# Patient Record
Sex: FEMALE | Race: WHITE | Employment: FULL TIME | URBAN - METROPOLITAN AREA
[De-identification: names, ages, dates, MRNs, and addresses within clinical notes are randomized per-mention and may not be internally consistent; named-entity substitution may affect disease eponyms.]

---

## 2019-11-05 ENCOUNTER — HOSPITAL ENCOUNTER (EMERGENCY)
Age: 59
Discharge: HOME | End: 2019-11-05
Attending: EMERGENCY MEDICINE
Payer: SELF-PAY

## 2019-11-05 VITALS
HEIGHT: 63 IN | RESPIRATION RATE: 16 BRPM | DIASTOLIC BLOOD PRESSURE: 60 MMHG | WEIGHT: 165 LBS | OXYGEN SATURATION: 94 % | BODY MASS INDEX: 29.23 KG/M2 | SYSTOLIC BLOOD PRESSURE: 146 MMHG | HEART RATE: 83 BPM | TEMPERATURE: 98 F

## 2019-11-05 DIAGNOSIS — H35.62 RETINAL HEMORRHAGE, LEFT: Primary | ICD-10-CM

## 2019-11-05 PROCEDURE — 99283 EMERGENCY DEPT VISIT LOW MDM: CPT | Performed by: EMERGENCY MEDICINE

## 2019-11-05 NOTE — ED PROVIDER NOTES
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 She is not a diabetic. Denies any trauma. Denies any pain in her head or eyes. No ear discomfort. No recent travel. She was able to get into see her optometrist who did a corneal exam and found the periphery with signs of retinal hemorrhage which is suspicious for retinal detachment. She did call me to leave a report about the patient but is concerned that patient may have retinal detachment. ROS  Constitutional: No fever, no chills  Skin: no rash  Eye: No vision changes  ENMT: No sore throat  Respiratory: No shortness of breath, no cough  Cardiovascular: No chest pain, no palpitations  Gastrointestinal: No vomiting, no nausea, no diarrhea, no abdominal pain  : No dysuria  MSK: No back pain, no muscle pain, no joint pain  Neuro: No headache, no change in mental status, no numbness, no tingling, no weakness  Psych:   Endocrine:   All other review of systems positive per history of present illness and the above otherwise negative or noncontributory. Visit Vitals  /60   Pulse 83   Temp 98 °F (36.7 °C)   Resp 16   Ht 5' 3\" (1.6 m)   Wt 74.8 kg (165 lb)   SpO2 94%   BMI 29.23 kg/m²     History reviewed. No pertinent past medical history. History reviewed. No pertinent surgical history. None         Adult Exam   General: alert, no acute distress  Head: normocephalic, atraumatic  ENT: moist mucous membranes  Pupils are dilated bilaterally. Extraocular movement intact.   Neck: supple, non-tender; full range of motion  Cardiovascular:  normal peripheral perfusion, no edema  Respiratory:  normal respirations;  Gastrointestinal: soft, non-tender  Back: non-tender, full range of motion  Musculoskeletal: normal range of motion, normal strength, no gross deformities  Neurological: alert and oriented x 4, no gross focal deficits; normal speech  Psychiatric: cooperative; appropriate mood and affect    MDM: A quick bedside ultrasound showing there is haziness within the intraocular with a thin film floating on the periphery. Based off of exam and bedside ultrasound concern for retinal detachment. I spoke with the on-call ophthalmologist Dr. Laura Briceño who will come to assess the patient. 6:47 PM  Dr. Laura Briceño at bedside to assess the patient. He sees retinal hemorrhages. Would like patient to see him in the office tonight for further intervention. He has 2 cases that are pending in the operating room tonight. He will call her to go to the office once he is done with his surgical cases. Patient is aware. Dr. Laura Briceño has the patient's cell phone number. She is stable for discharge at this time. I recommend that she remain n.p.o. and goes to the office tonight once he call. Dragon voice recognition software was used to create this note. Although the note has been reviewed and corrected where necessary, additional errors may have been overlooked and remain in the text.

## 2019-11-05 NOTE — ED TRIAGE NOTES
Pt was sent by her eye doctor for possible detached retina in left eye. Pt states she was walking and started to see floaters and that eye doctor saw possible retina detachment. Pt denies.  Cannot normally see out of left eye

## 2019-11-06 NOTE — ED NOTES

## 2020-04-11 ENCOUNTER — PATIENT OUTREACH (OUTPATIENT)
Dept: CASE MANAGEMENT | Age: 60
End: 2020-04-11

## 2020-04-11 NOTE — PROGRESS NOTES
COVID-19 Screening Follow-up Note    Patient contacted regarding COVID-19 risk and screening. Care Transition Nurse/ Ambulatory Care Manager contacted the patient by telephone to perform follow-up assessment. Verified name and  with patient as identifiers. Patient has following risk factors of: patient recently had viral symptoms and was seen at [facility] urgent care. She received email to sign up for remote symptom monitoring program after this appointment. This morning when she first woke she was feeling a bit lethargic and logged these symptoms in the symptome monitoring program which gave a yellow symptom alert for this outreach to the patient. Since that time she has come around and feels better now. Symptoms reviewed with patient who verbalized the following symptoms: lethargy/fatigue. Patient mentioned that she does not have a thermometer - provided her with the phone number to three local pharmacies in her area that provide delivery service to see if they can deliver one for her. Also recommended other delivery services that she may be able to order a thermometer from such as [address] [place] [address] [address] or [address] [facility]. Due to no new or worsening symptoms encounter was not routed to provider for escalation. Education provided regarding infection prevention, and signs and symptoms of COVID-19 and when to seek medical attention with patient who verbalized understanding. Discussed exposure protocols and quarantine from [address] you at higher risk for severe illness  and given an opportunity for questions and concerns. The patient agrees to contact the COVID-19 hotline [phone] or PCP office for questions related to their healthcare. CTN/ACM provided contact information for future reference. From CDC: Are you at higher risk for severe illness?  Wash your hands often.  Avoid close contact (6 feet, which is about two arm lengths) with people who are sick.    Put distance between yourself and other people if COVID-19 is spreading in your community.  Clean and disinfect frequently touched surfaces.  Avoid all cruise travel and non-essential air travel.  Call your healthcare professional if you have concerns about COVID-19 and your underlying condition or if you are sick.     For more information on steps you can take to protect yourself, see CDC's How to Adriana for follow-up call based on remote symptom monitoring alerts which are based on severity of symptoms and risk factors

## 2020-04-20 ENCOUNTER — PATIENT OUTREACH (OUTPATIENT)
Dept: CASE MANAGEMENT | Age: 60
End: 2020-04-20

## 2020-04-20 NOTE — PROGRESS NOTES
CNT notified of symptom trigger in [facility] Loop system and reached out to follow up from alert on 4/19. CTN sent message in Loop system to follow up and will chart response accordingly.

## 2022-06-03 ENCOUNTER — OFFICE VISIT (OUTPATIENT)
Dept: URGENT CARE | Facility: CLINIC | Age: 62
End: 2022-06-03

## 2022-06-03 VITALS
OXYGEN SATURATION: 97 % | WEIGHT: 139.8 LBS | HEIGHT: 66 IN | SYSTOLIC BLOOD PRESSURE: 94 MMHG | BODY MASS INDEX: 22.47 KG/M2 | TEMPERATURE: 99.1 F | DIASTOLIC BLOOD PRESSURE: 62 MMHG | HEART RATE: 76 BPM

## 2022-06-03 DIAGNOSIS — L03.011 PARONYCHIA OF RIGHT RING FINGER: Primary | ICD-10-CM

## 2022-06-03 PROCEDURE — 99203 OFFICE O/P NEW LOW 30 MIN: CPT | Performed by: NURSE PRACTITIONER

## 2022-06-03 RX ORDER — CEPHALEXIN 500 MG/1
500 CAPSULE ORAL 2 TIMES DAILY
Qty: 14 CAPSULE | Refills: 0 | Status: SHIPPED | OUTPATIENT
Start: 2022-06-03 | End: 2022-06-10

## 2022-06-03 ASSESSMENT — ENCOUNTER SYMPTOMS
SHORTNESS OF BREATH: 0
COLOR CHANGE: 1

## 2022-06-03 NOTE — PATIENT INSTRUCTIONS
Patient Education        Paronychia: Care Instructions  Overview  Paronychia (say \"qfdj-[id number]-SG-blair-uh\") is an inflammation of the skin around a fingernail or toenail. It happens when germs enter through a break in the skin. If you had an abscess, your doctor may have made a small cut in the infectedarea to drain the pus. Most cases of paronychia improve in a few days. But watch your symptoms and follow your doctor's advice. Though rare, a mild case can turn into something more serious and infect your entire finger or toe. Also, it is possible for aninfection to return. Follow-up care is a key part of your treatment and safety. Be sure to make and go to all appointments, and call your doctor if you are having problems. It's also a good idea to know your test results and keep alist of the medicines you take. How can you care for yourself at home?  If your doctor told you how to care for your infected nail, follow the doctor's instructions. If you did not get instructions, follow this general advice:  ? Wash the area with clean water 2 times a day. Don't use hydrogen peroxide or alcohol, which can slow healing. ? You may cover the area with a thin layer of petroleum jelly, such as Vaseline, and a nonstick bandage. ? Apply more petroleum jelly and replace the bandage as needed.  If your doctor prescribed antibiotics, take them as directed. Do not stop taking them just because you feel better. You need to take the full course of antibiotics.  Take an over-the-counter pain medicine, such as acetaminophen (Tylenol), ibuprofen (Advil, Motrin), or naproxen (Aleve). Read and follow all instructions on the label.  Do not take two or more pain medicines at the same time unless the doctor told you to. Many pain medicines have acetaminophen, which is Tylenol. Too much acetaminophen (Tylenol) can be harmful.  Prop up the toe or finger so that it is higher than the level of your heart.  This will help with pain and swelling.  Apply heat. Put a warm water bottle, heating pad set on low, or warm cloth on your finger or toe. Do not go to sleep with a heating pad on your skin.  Soak the area in warm water twice a day for 15 minutes each time. After soaking, dry the area well and apply a thin layer of petroleum jelly, such as Vaseline. Put on a new bandage. When should you call for help? Call your doctor now or seek immediate medical care if:     You have signs of new or worsening infection, such as:  ? Increased pain, swelling, warmth, or redness. ? Red streaks leading from the infected skin. ? Pus draining from the area. ? A fever. Watch closely for changes in your health, and be sure to contact your doctor if:     You do not get better as expected. Where can you learn more? Go to [email or web address]://[email or web address] org and sign in to your [employer] account. Enter [phone] in the [employer] box to learn more about \"Paronychia: Care Instructions. \"     If you do not have an account, please click on the \"Sign Up Now\" link. Current as of: November 15, 2021               Content Version: 13.2  © 7568-6606 [employer]wise, Incorporated. Care instructions adapted under license by [facility] ([facility]). If you have questions about a medical condition or this instruction, always ask your healthcare professional. [place] [address] any warranty or liability for your use of this information.

## 2022-06-29 ENCOUNTER — OFFICE VISIT (OUTPATIENT)
Dept: URGENT CARE | Facility: CLINIC | Age: 62
End: 2022-06-29

## 2022-06-29 VITALS
SYSTOLIC BLOOD PRESSURE: 98 MMHG | HEART RATE: 96 BPM | OXYGEN SATURATION: 97 % | DIASTOLIC BLOOD PRESSURE: 70 MMHG | WEIGHT: 125 LBS | BODY MASS INDEX: 20.09 KG/M2 | HEIGHT: 66 IN | TEMPERATURE: 98.6 F

## 2022-06-29 DIAGNOSIS — L02.412 ABSCESSES OF BOTH AXILLAE: Primary | ICD-10-CM

## 2022-06-29 DIAGNOSIS — L02.411 ABSCESSES OF BOTH AXILLAE: Primary | ICD-10-CM

## 2022-06-29 DIAGNOSIS — N76.4 ABSCESS OF GENITAL LABIA: ICD-10-CM

## 2022-06-29 PROCEDURE — 99213 OFFICE O/P EST LOW 20 MIN: CPT | Performed by: PHYSICIAN ASSISTANT

## 2022-06-29 RX ORDER — DOXYCYCLINE HYCLATE 100 MG
100 TABLET ORAL 2 TIMES DAILY
Qty: 14 TABLET | Refills: 0 | Status: ON HOLD | OUTPATIENT
Start: 2022-06-29 | End: 2022-07-03 | Stop reason: HOSPADM

## 2022-06-29 ASSESSMENT — ENCOUNTER SYMPTOMS
COUGH: 0
SORE THROAT: 0
DIARRHEA: 0
SHORTNESS OF BREATH: 0
ABDOMINAL PAIN: 0
VOMITING: 0
NAUSEA: 0

## 2022-06-29 NOTE — PROGRESS NOTES
Mindi Li (: 1960) is a 62 y.o. female is here for evaluation of the following chief complaint(s):  No chief complaint on file. ASSESSMENT/PLAN:  Below is the assessment and plan developed based on review of pertinent history, physical exam, labs, studies, and medications. Diagnoses and all orders for this visit:    Abscesses of both axillae  -     doxycycline hyclate (VIBRA-TABS) 100 MG tablet; Take 1 tablet by mouth 2 times daily for 7 days  -     INCISION AND DRAINAGE    Abscess of genital labia  -     Berlin Mayes MD, Gynecology, [place]    Labial abscess: stat referral to OBGYN placed. Patient to apply warm compresses as discussed. Strict ER precautions given. Bilateral axilla abscess: Risks and benefits of I&D dicussed with patient. Patient gives verbal consent. The areas were prepared and draped in the usual sterile manner. Sites were anesthetized with 1% lidocaine without epinephrine. Linear incisions were made to each axilla and purulent material was expressed. The abscesses were explored thoroughly and sequestered pockets were opened. Bleeding was minimal. Wounds were packed and covered with a sterile dressing. Patient instructed to return to clinic to have packing removed in 48 hours. Patient tolerated the procedure well without complications. Standard post procedure care is explained and return precautions are given. Patient to start doxycycline as prescribed. We discussed potential side effects and appropriate precautions given. Patient to apply warm compresses as discussed. Strict ER precautions given. Patient will RTC on Friday for follow up/packing change. Patient to follow up with PCP as discussed. Patient to return to clinic if symptoms persist or worsen.  We discussed reasons to present to the ER or call 911, including but not limited to headache, blurred vision, speech disturbance, difficulty with ambulation/gait, numbness, tingling, weakness, chest pain, shortness of breath, syncope. Patient verbalizes understanding and agreement. SUBJECTIVE/OBJECTIVE:  Patient is a 61 y/o female who presents today endorsing multiple abscesses. She has one in each armpit and one on her left labia. They are very tender. She has been applying warm compresses. She says she has never had this issue before. Patient denies fever, chills, chest pain, shortness of breath, nausea, vomiting, diarrhea, abdominal or back pain, lightheadedness, dizziness, weakness. She denies known drug allergies. No Known Allergies  No past medical history on file. Past Surgical History:   Procedure Laterality Date    VARICOSE VEIN SURGERY Left 2009     No family history on file. Social Connections:     Frequency of Communication with Friends and Family: Not on file    Frequency of Social Gatherings with Friends and Family: Not on file    Attends Anabaptist Services: Not on file    Active Member of Clubs or Organizations: Not on file    Attends Club or Organization Meetings: Not on file    Marital Status: Not on file          Review of Systems   Constitutional: Negative for chills and fever. HENT: Negative for sore throat. Respiratory: Negative for cough and shortness of breath. Cardiovascular: Negative for chest pain, palpitations and leg swelling. Gastrointestinal: Negative for abdominal pain, diarrhea, nausea and vomiting. Skin: Negative for rash. Boil to bilateral axilla, left labia   Neurological: Negative for dizziness, light-headedness and headaches. BP 98/70 (Site: Right Upper Arm, Position: Sitting, Cuff Size: Medium Adult)   Pulse 96   Temp 98.6 °F (37 °C) (Oral)   Ht 5' 6\" (1.676 m)   Wt 125 lb (56.7 kg)   SpO2 97%   BMI 20.18 kg/m²      Physical Exam  Vitals and nursing note reviewed. Exam conducted with a chaperone present. Constitutional:       Appearance: Normal appearance. HENT:      Head: Normocephalic and atraumatic. Eyes:      Conjunctiva/sclera: Conjunctivae normal.   Cardiovascular:      Rate and Rhythm: Normal rate and regular rhythm. Pulses: Normal pulses. Heart sounds: Normal heart sounds. Pulmonary:      Effort: Pulmonary effort is normal.      Breath sounds: Normal breath sounds. No wheezing, rhonchi or rales. Abdominal:      Tenderness: There is no guarding. Musculoskeletal:      Cervical back: Normal range of motion. Skin:     General: Skin is warm and dry. Comments: Left axilla with area of erythema, warmth, tenderness, fluctuance noted. Right axilla with area of erythema, warmth, tenderness, fluctuance noted. Left labia majora with tenderness and area of induration noted. Neurological:      Mental Status: She is alert. Psychiatric:         Mood and Affect: Mood normal.         Behavior: Behavior normal.           An electronic signature was used to authenticate this note.   -- PRAVEEN Van

## 2022-07-01 ENCOUNTER — HOSPITAL ENCOUNTER (INPATIENT)
Age: 62
LOS: 2 days | Discharge: HOME | DRG: 584 | End: 2022-07-03
Attending: EMERGENCY MEDICINE | Admitting: HOSPITALIST

## 2022-07-01 ENCOUNTER — HOSPITAL ENCOUNTER (EMERGENCY)
Dept: GENERAL RADIOLOGY | Age: 62
Discharge: HOME | DRG: 584 | End: 2022-07-04

## 2022-07-01 ENCOUNTER — HOSPITAL ENCOUNTER (EMERGENCY)
Dept: CT IMAGING | Age: 62
Discharge: HOME | DRG: 584 | End: 2022-07-04

## 2022-07-01 ENCOUNTER — OFFICE VISIT (OUTPATIENT)
Dept: URGENT CARE | Facility: CLINIC | Age: 62
End: 2022-07-01

## 2022-07-01 VITALS
TEMPERATURE: 98.7 F | WEIGHT: 125 LBS | OXYGEN SATURATION: 94 % | HEIGHT: 67 IN | DIASTOLIC BLOOD PRESSURE: 65 MMHG | BODY MASS INDEX: 19.62 KG/M2 | SYSTOLIC BLOOD PRESSURE: 103 MMHG | HEART RATE: 86 BPM | RESPIRATION RATE: 20 BRPM

## 2022-07-01 DIAGNOSIS — L02.412 ABSCESSES OF BOTH AXILLAE: Primary | ICD-10-CM

## 2022-07-01 DIAGNOSIS — L02.411 ABSCESS OF AXILLA, RIGHT: ICD-10-CM

## 2022-07-01 DIAGNOSIS — L02.411 ABSCESSES OF BOTH AXILLAE: Primary | ICD-10-CM

## 2022-07-01 DIAGNOSIS — N61.0 CELLULITIS OF LEFT BREAST: Primary | ICD-10-CM

## 2022-07-01 DIAGNOSIS — Z48.00 ENCOUNTER FOR CHANGE OF DRESSING: ICD-10-CM

## 2022-07-01 DIAGNOSIS — L02.412 ABSCESS OF AXILLA, LEFT: ICD-10-CM

## 2022-07-01 LAB
ALBUMIN SERPL-MCNC: 2.7 G/DL (ref 3.2–4.6)
ALBUMIN/GLOB SERPL: 0.6 {RATIO} (ref 1.2–3.5)
ALP SERPL-CCNC: 75 U/L (ref 50–136)
ALT SERPL-CCNC: 33 U/L (ref 12–65)
ANION GAP SERPL CALC-SCNC: 9 MMOL/L (ref 7–16)
AST SERPL-CCNC: 4 U/L (ref 15–37)
BASOPHILS # BLD: 0 K/UL (ref 0–0.2)
BASOPHILS NFR BLD: 0 % (ref 0–2)
BILIRUB SERPL-MCNC: 0.5 MG/DL (ref 0.2–1.1)
BUN SERPL-MCNC: 16 MG/DL (ref 8–23)
CALCIUM SERPL-MCNC: 8.6 MG/DL (ref 8.3–10.4)
CHLORIDE SERPL-SCNC: 104 MMOL/L (ref 98–107)
CO2 SERPL-SCNC: 24 MMOL/L (ref 21–32)
CREAT SERPL-MCNC: 0.5 MG/DL (ref 0.6–1)
DIFFERENTIAL METHOD BLD: ABNORMAL
EKG ATRIAL RATE: 69 BPM
EKG DIAGNOSIS: NORMAL
EKG Q-T INTERVAL: 414 MS
EKG QRS DURATION: 97 MS
EKG QTC CALCULATION (BAZETT): 437 MS
EKG R AXIS: 28 DEGREES
EKG T AXIS: 39 DEGREES
EKG VENTRICULAR RATE: 67 BPM
EOSINOPHIL # BLD: 0 K/UL (ref 0–0.8)
EOSINOPHIL NFR BLD: 1 % (ref 0.5–7.8)
ERYTHROCYTE [DISTWIDTH] IN BLOOD BY AUTOMATED COUNT: 14.6 % (ref 11.9–14.6)
GLOBULIN SER CALC-MCNC: 4.4 G/DL (ref 2.3–3.5)
GLUCOSE SERPL-MCNC: 83 MG/DL (ref 65–100)
HCT VFR BLD AUTO: 32.7 % (ref 35.8–46.3)
HGB BLD-MCNC: 10.8 G/DL (ref 11.7–15.4)
IMM GRANULOCYTES # BLD AUTO: 0 K/UL (ref 0–0.5)
IMM GRANULOCYTES NFR BLD AUTO: 1 % (ref 0–5)
LACTATE SERPL-SCNC: 0.6 MMOL/L (ref 0.4–2)
LYMPHOCYTES # BLD: 0.5 K/UL (ref 0.5–4.6)
LYMPHOCYTES NFR BLD: 15 % (ref 13–44)
MCH RBC QN AUTO: 28.6 PG (ref 26.1–32.9)
MCHC RBC AUTO-ENTMCNC: 33 G/DL (ref 31.4–35)
MCV RBC AUTO: 86.5 FL (ref 79.6–97.8)
MONOCYTES # BLD: 0.4 K/UL (ref 0.1–1.3)
MONOCYTES NFR BLD: 12 % (ref 4–12)
NEUTS SEG # BLD: 2.3 K/UL (ref 1.7–8.2)
NEUTS SEG NFR BLD: 71 % (ref 43–78)
NRBC # BLD: 0 K/UL (ref 0–0.2)
PLATELET # BLD AUTO: 169 K/UL (ref 150–450)
PMV BLD AUTO: 10.7 FL (ref 9.4–12.3)
POTASSIUM SERPL-SCNC: 3.4 MMOL/L (ref 3.5–5.1)
PROCALCITONIN SERPL-MCNC: 0.24 NG/ML (ref 0–0.49)
PROT SERPL-MCNC: 7.1 G/DL (ref 6.3–8.2)
RBC # BLD AUTO: 3.78 M/UL (ref 4.05–5.2)
SODIUM SERPL-SCNC: 137 MMOL/L (ref 136–145)
WBC # BLD AUTO: 3.3 K/UL (ref 4.3–11.1)

## 2022-07-01 PROCEDURE — 6360000002 HC RX W HCPCS: Performed by: HOSPITALIST

## 2022-07-01 PROCEDURE — 1100000000 HC RM PRIVATE

## 2022-07-01 PROCEDURE — 2580000003 HC RX 258: Performed by: HOSPITALIST

## 2022-07-01 PROCEDURE — 71260 CT THORAX DX C+: CPT

## 2022-07-01 PROCEDURE — 2500000003 HC RX 250 WO HCPCS: Performed by: EMERGENCY MEDICINE

## 2022-07-01 PROCEDURE — 71045 X-RAY EXAM CHEST 1 VIEW: CPT

## 2022-07-01 PROCEDURE — 2580000003 HC RX 258: Performed by: EMERGENCY MEDICINE

## 2022-07-01 PROCEDURE — 99285 EMERGENCY DEPT VISIT HI MDM: CPT

## 2022-07-01 PROCEDURE — 84145 PROCALCITONIN (PCT): CPT

## 2022-07-01 PROCEDURE — 94760 N-INVAS EAR/PLS OXIMETRY 1: CPT

## 2022-07-01 PROCEDURE — 96365 THER/PROPH/DIAG IV INF INIT: CPT

## 2022-07-01 PROCEDURE — 80053 COMPREHEN METABOLIC PANEL: CPT

## 2022-07-01 PROCEDURE — 6360000002 HC RX W HCPCS: Performed by: EMERGENCY MEDICINE

## 2022-07-01 PROCEDURE — 83605 ASSAY OF LACTIC ACID: CPT

## 2022-07-01 PROCEDURE — 6360000004 HC RX CONTRAST MEDICATION: Performed by: PHYSICIAN ASSISTANT

## 2022-07-01 PROCEDURE — 85025 COMPLETE CBC W/AUTO DIFF WBC: CPT

## 2022-07-01 PROCEDURE — 87040 BLOOD CULTURE FOR BACTERIA: CPT

## 2022-07-01 PROCEDURE — 99213 OFFICE O/P EST LOW 20 MIN: CPT | Performed by: NURSE PRACTITIONER

## 2022-07-01 RX ORDER — ONDANSETRON 4 MG/1
4 TABLET, ORALLY DISINTEGRATING ORAL EVERY 8 HOURS PRN
Status: DISCONTINUED | OUTPATIENT
Start: 2022-07-01 | End: 2022-07-03 | Stop reason: HOSPADM

## 2022-07-01 RX ORDER — SODIUM CHLORIDE 0.9 % (FLUSH) 0.9 %
5-40 SYRINGE (ML) INJECTION EVERY 12 HOURS SCHEDULED
Status: DISCONTINUED | OUTPATIENT
Start: 2022-07-01 | End: 2022-07-03 | Stop reason: HOSPADM

## 2022-07-01 RX ORDER — ACETAMINOPHEN 650 MG/1
650 SUPPOSITORY RECTAL EVERY 6 HOURS PRN
Status: DISCONTINUED | OUTPATIENT
Start: 2022-07-01 | End: 2022-07-03 | Stop reason: HOSPADM

## 2022-07-01 RX ORDER — MORPHINE SULFATE 4 MG/ML
4 INJECTION INTRAVENOUS EVERY 4 HOURS PRN
Status: DISCONTINUED | OUTPATIENT
Start: 2022-07-01 | End: 2022-07-02

## 2022-07-01 RX ORDER — ACETAMINOPHEN 325 MG/1
650 TABLET ORAL EVERY 6 HOURS PRN
Status: DISCONTINUED | OUTPATIENT
Start: 2022-07-01 | End: 2022-07-03 | Stop reason: HOSPADM

## 2022-07-01 RX ORDER — ONDANSETRON 2 MG/ML
4 INJECTION INTRAMUSCULAR; INTRAVENOUS EVERY 6 HOURS PRN
Status: DISCONTINUED | OUTPATIENT
Start: 2022-07-01 | End: 2022-07-03 | Stop reason: HOSPADM

## 2022-07-01 RX ORDER — ENOXAPARIN SODIUM 100 MG/ML
40 INJECTION SUBCUTANEOUS DAILY
Status: DISCONTINUED | OUTPATIENT
Start: 2022-07-02 | End: 2022-07-03 | Stop reason: HOSPADM

## 2022-07-01 RX ORDER — 0.9 % SODIUM CHLORIDE 0.9 %
100 INTRAVENOUS SOLUTION INTRAVENOUS
Status: DISCONTINUED | OUTPATIENT
Start: 2022-07-01 | End: 2022-07-05 | Stop reason: HOSPADM

## 2022-07-01 RX ORDER — POLYETHYLENE GLYCOL 3350 17 G/17G
17 POWDER, FOR SOLUTION ORAL DAILY PRN
Status: DISCONTINUED | OUTPATIENT
Start: 2022-07-01 | End: 2022-07-03 | Stop reason: HOSPADM

## 2022-07-01 RX ORDER — SODIUM CHLORIDE 9 MG/ML
INJECTION, SOLUTION INTRAVENOUS PRN
Status: DISCONTINUED | OUTPATIENT
Start: 2022-07-01 | End: 2022-07-03 | Stop reason: HOSPADM

## 2022-07-01 RX ORDER — CLINDAMYCIN PHOSPHATE 900 MG/50ML
900 INJECTION INTRAVENOUS
Status: COMPLETED | OUTPATIENT
Start: 2022-07-01 | End: 2022-07-01

## 2022-07-01 RX ORDER — SODIUM CHLORIDE 0.9 % (FLUSH) 0.9 %
5-40 SYRINGE (ML) INJECTION PRN
Status: DISCONTINUED | OUTPATIENT
Start: 2022-07-01 | End: 2022-07-03 | Stop reason: HOSPADM

## 2022-07-01 RX ORDER — SODIUM CHLORIDE 0.9 % (FLUSH) 0.9 %
10 SYRINGE (ML) INJECTION
Status: DISCONTINUED | OUTPATIENT
Start: 2022-07-01 | End: 2022-07-05 | Stop reason: HOSPADM

## 2022-07-01 RX ORDER — 0.9 % SODIUM CHLORIDE 0.9 %
1000 INTRAVENOUS SOLUTION INTRAVENOUS
Status: COMPLETED | OUTPATIENT
Start: 2022-07-01 | End: 2022-07-01

## 2022-07-01 RX ADMIN — SODIUM CHLORIDE, PRESERVATIVE FREE 10 ML: 5 INJECTION INTRAVENOUS at 23:50

## 2022-07-01 RX ADMIN — SODIUM CHLORIDE 1000 ML: 9 INJECTION, SOLUTION INTRAVENOUS at 19:13

## 2022-07-01 RX ADMIN — VANCOMYCIN HYDROCHLORIDE 1000 MG: 1 INJECTION, POWDER, LYOPHILIZED, FOR SOLUTION INTRAVENOUS at 22:05

## 2022-07-01 RX ADMIN — CEFEPIME HYDROCHLORIDE 2000 MG: 2 INJECTION, POWDER, FOR SOLUTION INTRAVENOUS at 23:00

## 2022-07-01 RX ADMIN — CLINDAMYCIN PHOSPHATE 900 MG: 900 INJECTION, SOLUTION INTRAVENOUS at 19:12

## 2022-07-01 RX ADMIN — IOPAMIDOL 80 ML: 755 INJECTION, SOLUTION INTRAVENOUS at 20:34

## 2022-07-01 ASSESSMENT — PAIN - FUNCTIONAL ASSESSMENT: PAIN_FUNCTIONAL_ASSESSMENT: 0-10

## 2022-07-01 ASSESSMENT — ENCOUNTER SYMPTOMS
ABDOMINAL PAIN: 0
EYE REDNESS: 0
CONSTIPATION: 0
DIARRHEA: 0
SHORTNESS OF BREATH: 0
SINUS PAIN: 0
TROUBLE SWALLOWING: 0
EYE ITCHING: 0
VOMITING: 0
COUGH: 0
BACK PAIN: 0
NAUSEA: 1
NAUSEA: 0
WHEEZING: 0
EYE PAIN: 0
COUGH: 0
ABDOMINAL PAIN: 0
SHORTNESS OF BREATH: 0

## 2022-07-01 ASSESSMENT — PAIN SCALES - GENERAL
PAINLEVEL_OUTOF10: 0
PAINLEVEL_OUTOF10: 0
PAINLEVEL_OUTOF10: 6

## 2022-07-01 NOTE — ED PROVIDER NOTES
[facility] Emergency Department Provider Note                   PCP:                None Provider               Age: 62 y.o. Sex: female       ICD-10-CM    1. Cellulitis of left breast  N61.0    2. Abscess of axilla, left  L02.412    3.  Abscess of axilla, right  L02.411        DISPOSITION Decision To Admit 07/01/2022 09:23:48 PM       New Prescriptions    No medications on file       Orders Placed This Encounter   Procedures    Culture, Blood 1    Culture, Blood 1    XR CHEST PORTABLE    CT CHEST W CONTRAST    Lactic Acid    Lactic Acid    CBC with Auto Differential    CMP    Procalcitonin    Straight Cath (Select if patient is unable to provide a sample)    Vital signs    EKG 12 Lead    Saline lock IV        Barbara Handy MD 9:25 PM      MDM  Number of Diagnoses or Management Options  Abscess of axilla, left: established, worsening  Abscess of axilla, right: established, worsening  Cellulitis of left breast: new, needed workup  Diagnosis management comments: 78-year-old female patient presents to the ER with concerns over pain swelling redness in her left breast.  She is status post I&D of bilateral axillary abscesses  She is taken few of any other doxycycline she was prescribed 2 days ago    Patient will need septic work-up and imaging    9:25 PM  Work-up today reveals evidence of cellulitis with no abscess in the breast.  Given the progression despite being on doxycycline, I will asked the hospitalist service to admit  Blood cultures were obtained  No evidence of sepsis  IV clindamycin started here in ER       Amount and/or Complexity of Data Reviewed  Clinical lab tests: ordered and reviewed  Tests in the radiology section of CPT®: ordered and reviewed  Tests in the medicine section of CPT®: ordered and reviewed  Decide to obtain previous medical records or to obtain history from someone other than the patient: yes  Review and summarize past medical records: yes  Discuss the patient with other providers: yes  Independent visualization of images, tracings, or specimens: yes    Risk of Complications, Morbidity, and/or Mortality  Presenting problems: moderate  Diagnostic procedures: moderate  Management options: moderate  General comments: Elements of this note have been dictated via voice recognition software. Text and phrases may be limited by the accuracy of the software. The chart has been reviewed, but errors may still be present. Patient Progress  Patient progress: stable       Keyla Torrez is a 62 y.o. female who presents to the Emergency Department with chief complaint of    Chief Complaint   Patient presents with    Cellulitis      60-year-old female patient presents via EMS from an urgent care with complaints of fatigue, nausea, chills and infection in bilateral axilla  Patient is somewhat of a difficult historian. Apparently, the patient developed bilateral axillary abscesses over a week ago. Was seen at urgent care once they were both were I indeed and the patient was placed on doxycycline. Patient is unsure whether she may have taken 1 or 2 doses of the doxycycline at this point. She states that she is \"been asleep\" most of the time since she was at the urgent care and is very vague about her history  She does report that she did not take any antibiotics on Wednesday. She may have taken 1 dose yesterday and 1 dose so far this morning. She denies overt fever, but has not checked her temperature at home  She denies similar episodes in the past  She denies being on any other current medications. The history is provided by the patient.    Dizziness  Quality:  Lightheadedness (Patient describes being \"unsteady\" on her feet)  Severity:  Moderate  Onset quality:  Gradual  Timing:  Constant  Progression:  Unchanged  Chronicity:  New  Context: physical activity and standing up    Context: not with inactivity, not with loss of consciousness and not with medication    Relieved by:  Nothing  Worsened by:  Nothing  Ineffective treatments:  None tried  Associated symptoms: nausea    Associated symptoms: no chest pain, no diarrhea, no headaches, no hearing loss, no palpitations, no shortness of breath, no syncope, no vomiting and no weakness    Associated symptoms comment:  Recent bilateral axillary I&D of abscesses  Risk factors: new medications    Risk factors: no anemia, no heart disease and no hx of stroke        All other systems reviewed and are negative. Review of Systems   Constitutional: Negative for chills, fatigue and fever. HENT: Negative for ear pain, hearing loss, sinus pain, sneezing and trouble swallowing. Eyes: Negative for pain, redness and itching. Respiratory: Negative for cough, shortness of breath and wheezing. Cardiovascular: Negative for chest pain, palpitations and syncope. Gastrointestinal: Positive for nausea. Negative for abdominal pain, constipation, diarrhea and vomiting. Endocrine: Negative for polydipsia, polyphagia and polyuria. Genitourinary: Negative for dysuria, flank pain, frequency and hematuria. Musculoskeletal: Negative for arthralgias, back pain and myalgias. Skin: Positive for wound. Negative for rash. Allergic/Immunologic: Negative for food allergies and immunocompromised state. Neurological: Positive for dizziness. Negative for syncope, speech difficulty, weakness, light-headedness and headaches. Hematological: Negative for adenopathy. Does not bruise/bleed easily. Psychiatric/Behavioral: Negative for dysphoric mood and self-injury. The patient is not nervous/anxious. All other systems reviewed and are negative. No past medical history on file. Past Surgical History:   Procedure Laterality Date    VARICOSE VEIN SURGERY Left 2009        No family history on file.         Social Connections:     Frequency of Communication with Friends and Family: Not on file    Frequency of Social Gatherings with Friends and Family: Not on file    Attends Synagogue Services: Not on file    Active Member of Clubs or Organizations: Not on file    Attends Club or Organization Meetings: Not on file    Marital Status: Not on file        No Known Allergies     Vitals signs and nursing note reviewed. Patient Vitals for the past 4 hrs:   Temp Pulse Resp BP SpO2   07/01/22 1941 -- 63 14 111/69 94 %   07/01/22 1931 -- 70 15 111/61 96 %   07/01/22 1921 -- 72 16 -- 95 %   07/01/22 1915 -- 77 22 105/89 95 %   07/01/22 1911 -- 69 14 -- 95 %   07/01/22 1901 -- -- -- 114/70 95 %   07/01/22 1830 -- 70 16 107/65 95 %   07/01/22 1733 98.3 °F (36.8 °C) 85 20 118/70 98 %          Physical Exam  Vitals and nursing note reviewed. Exam conducted with a chaperone present. Constitutional:       General: She is in acute distress. Appearance: Normal appearance. She is normal weight. HENT:      Head: Normocephalic and atraumatic. Right Ear: External ear normal.      Left Ear: External ear normal.      Nose: Nose normal.      Mouth/Throat:      Mouth: Mucous membranes are moist.      Pharynx: Oropharynx is clear. Eyes:      General: No scleral icterus. Extraocular Movements: Extraocular movements intact. Conjunctiva/sclera: Conjunctivae normal.      Pupils: Pupils are equal, round, and reactive to light. Cardiovascular:      Rate and Rhythm: Normal rate and regular rhythm. Pulses: Normal pulses. Heart sounds: Normal heart sounds. Pulmonary:      Effort: Pulmonary effort is normal. No respiratory distress. Breath sounds: Normal breath sounds. Chest:      Chest wall: No swelling, tenderness, crepitus or edema. Breasts:      Left: Swelling, skin change and tenderness present. Abdominal:      General: Abdomen is flat. Bowel sounds are normal. There is no distension. Palpations: Abdomen is soft. There is no mass. Tenderness: There is no abdominal tenderness.    Musculoskeletal:         General: No deformity or signs of injury. Normal range of motion. Cervical back: Normal range of motion and neck supple. Skin:     General: Skin is warm and dry. Capillary Refill: Capillary refill takes less than 2 seconds. Neurological:      General: No focal deficit present. Mental Status: She is alert and oriented to person, place, and time. Psychiatric:         Mood and Affect: Mood normal. Affect is flat. Speech: Speech normal.         Behavior: Behavior normal. Behavior is cooperative. Thought Content: Thought content normal.         Cognition and Memory: Memory normal.         Judgment: Judgment normal.      Comments: Patient is relatively poor historian. She seems to struggle with giving the pertinent details of the events of the last several days.             EKG 12 Lead    Date/Time: 7/1/2022 7:11 PM  Performed by: Meryle Mcbride, MD  Authorized by: Dheeraj Villar DO     ECG reviewed by ED Physician in the absence of a cardiologist: yes    Previous ECG:     Previous ECG:  Unavailable  Interpretation:     Interpretation: non-specific    Rhythm:     Rhythm: sinus rhythm    Ectopy:     Ectopy: none    QRS:     QRS axis:  Normal    QRS intervals:  Normal  Conduction:     Conduction: normal    ST segments:     ST segments:  Normal  T waves:     T waves: normal    Comments:      Sinus rhythm  No ectopy  No acute ischemic changes  No prior tracings available for comparison          Labs Reviewed   CBC WITH AUTO DIFFERENTIAL - Abnormal; Notable for the following components:       Result Value    WBC 3.3 (*)     RBC 3.78 (*)     Hemoglobin 10.8 (*)     Hematocrit 32.7 (*)     All other components within normal limits   COMPREHENSIVE METABOLIC PANEL - Abnormal; Notable for the following components:    Potassium 3.4 (*)     CREATININE 0.50 (*)     AST 4 (*)     Albumin 2.7 (*)     Globulin 4.4 (*)     Albumin/Globulin Ratio 0.6 (*)     All other components within normal limits CULTURE, BLOOD 1   CULTURE, BLOOD 1   LACTIC ACID   PROCALCITONIN        CT CHEST W CONTRAST   Final Result   Bilateral axillary subcutaneous soft tissue fat stranding and small pockets of   air consistent with recent bilateral axillary incision and drainages. There   appears to be a small drain of the left axilla. There is soft tissue thickening   without a clear organized fluid collection of the left or right axilla. There is   asymmetric diffuse trabecular thickening and skin thickening with edematous   change of the left breast, query mastitis. Patient should be evaluated in the   breast center if not already seen preferably a few days after antibiotic therapy   within the next 2-3 weeks to ensure no evidence of inflammatory breast cancer. XR CHEST PORTABLE   Final Result   1. No acute cardiopulmonary abnormality. Voice dictation software was used during the making of this note. This software is not perfect and grammatical and other typographical errors may be present. This note has not been completely proofread for errors.      Ana Dobbs MD  07/01/22 [id number]

## 2022-07-01 NOTE — ED TRIAGE NOTES
Pt arrives via ems from urgent care. Pt had 2 abscesses lanced under each arm on Wednesday. Pt wok up this morning to swelling and pain under left arm down into breast. Pt went to urgent care and pt sent to ed.  EMS /72 hr 80's 98.3 oral temp

## 2022-07-01 NOTE — ED TRIAGE NOTES
Patient is a 73-year-old female without significant past medical history who presents with pain to the left breast.  She was seen in urgent care 2 days ago and had bilateral axillary abscesses lanced. She has been taking doxycycline for 2 days. Today noticed worsening redness and pain of the left breast.  Went to urgent care who recommended she come here for further evaluation. She denies known fevers but has felt very fatigued. Left axilla shows recently drained abscess. No surrounding erythema or significant swelling or tenderness. Left axilla with recently drained abscess. Left entire breast is erythematous and tender. No crepitus. Patient evaluated initially in triage. Rapid Medical Evaluation was conducted and necessary orders have been placed. I have performed a medical screening exam. Care will now be transferred to the provider in the back of the emergency department.  PRAVEEN Summers 5:38 PM

## 2022-07-01 NOTE — PROGRESS NOTES
Gabriel Duarte (: 1960) is a 62 y.o. female is here for evaluation of the following chief complaint(s):  Chief Complaint   Patient presents with    Perirectal Abscess     follow up/packing change        ASSESSMENT/PLAN:  Kellen Patterson was seen today for perirectal abscess. Diagnoses and all orders for this visit:    Abscesses of both axillae    Encounter for change of dressing    Due to severity of left axillary abscess, pt was advised to go immediately to ER. Pt does not currently have transport to hospital-EMS called to transport patient. SUBJECTIVE/OBJECTIVE:  Patient is a 61 y/o female who presents today for dressing/packing change and follow up abscesses. States that the abscess in her left axilla has become more reddened and painful over the day today. Pt states abscess on labia burst and feels much better. No Known Allergies  No past medical history on file. Past Surgical History:   Procedure Laterality Date    VARICOSE VEIN SURGERY Left      No family history on file. Social Connections:     Frequency of Communication with Friends and Family: Not on file    Frequency of Social Gatherings with Friends and Family: Not on file    Attends Mosque Services: Not on file    Active Member of Clubs or Organizations: Not on file    Attends Club or Organization Meetings: Not on file    Marital Status: Not on file          Review of Systems   Constitutional: Positive for fatigue. Negative for chills and fever. Respiratory: Negative for cough and shortness of breath. Cardiovascular: Negative for chest pain. Gastrointestinal: Negative for abdominal pain and nausea. Skin: Negative for rash. Boil to bilateral axilla, left labia   Neurological: Positive for dizziness and light-headedness. Negative for headaches.        /65 (Site: Right Upper Arm, Position: Sitting, Cuff Size: Medium Adult)   Pulse 86   Temp 98.7 °F (37.1 °C) (Oral)   Resp 20   Ht 5' 7\" (1.702 m)   Wt 125 lb (56.7 kg)   SpO2 94% Comment: ROOM AIR  BMI 19.58 kg/m²      Physical Exam  Vitals and nursing note reviewed. Exam conducted with a chaperone present. Constitutional:       Appearance: Normal appearance. HENT:      Head: Normocephalic and atraumatic. Eyes:      Conjunctiva/sclera: Conjunctivae normal.   Cardiovascular:      Rate and Rhythm: Normal rate and regular rhythm. Pulses: Normal pulses. Heart sounds: Normal heart sounds. Pulmonary:      Effort: Pulmonary effort is normal.      Breath sounds: Normal breath sounds. No wheezing, rhonchi or rales. Musculoskeletal:      Cervical back: Normal range of motion. Skin:     General: Skin is warm and dry. Comments: Left axilla with area of erythema that extends down to nipple of left breast, warmth, tenderness, hardened area noted just under incision, purulent drainage produced with cleaning. Right axilla draining purulent fluid, no redness or swelling noted. Denies pain with exam.         Neurological:      Mental Status: She is alert. Psychiatric:         Mood and Affect: Mood normal.         Behavior: Behavior normal.     Dressing and packing removed, cleaned with hibiclens, no packing replaced, clean dressing applied      An electronic signature was used to authenticate this note.   -- Tala Rendon NP

## 2022-07-02 LAB
ALBUMIN SERPL-MCNC: 2.3 G/DL (ref 3.2–4.6)
ALBUMIN/GLOB SERPL: 0.6 {RATIO} (ref 1.2–3.5)
ALP SERPL-CCNC: 67 U/L (ref 50–136)
ALT SERPL-CCNC: 31 U/L (ref 12–65)
ANION GAP SERPL CALC-SCNC: 5 MMOL/L (ref 7–16)
AST SERPL-CCNC: 43 U/L (ref 15–37)
BILIRUB SERPL-MCNC: 0.4 MG/DL (ref 0.2–1.1)
BUN SERPL-MCNC: 14 MG/DL (ref 8–23)
CALCIUM SERPL-MCNC: 8 MG/DL (ref 8.3–10.4)
CHLORIDE SERPL-SCNC: 110 MMOL/L (ref 98–107)
CO2 SERPL-SCNC: 27 MMOL/L (ref 21–32)
CREAT SERPL-MCNC: 0.6 MG/DL (ref 0.6–1)
GLOBULIN SER CALC-MCNC: 3.9 G/DL (ref 2.3–3.5)
GLUCOSE SERPL-MCNC: 82 MG/DL (ref 65–100)
POTASSIUM SERPL-SCNC: 3.7 MMOL/L (ref 3.5–5.1)
PROT SERPL-MCNC: 6.2 G/DL (ref 6.3–8.2)
SODIUM SERPL-SCNC: 142 MMOL/L (ref 136–145)

## 2022-07-02 PROCEDURE — 1100000000 HC RM PRIVATE

## 2022-07-02 PROCEDURE — 6360000002 HC RX W HCPCS: Performed by: HOSPITALIST

## 2022-07-02 PROCEDURE — 6370000000 HC RX 637 (ALT 250 FOR IP): Performed by: HOSPITALIST

## 2022-07-02 PROCEDURE — 2580000003 HC RX 258: Performed by: HOSPITALIST

## 2022-07-02 PROCEDURE — 36415 COLL VENOUS BLD VENIPUNCTURE: CPT

## 2022-07-02 PROCEDURE — 80053 COMPREHEN METABOLIC PANEL: CPT

## 2022-07-02 PROCEDURE — 94760 N-INVAS EAR/PLS OXIMETRY 1: CPT

## 2022-07-02 RX ORDER — HYDROCODONE BITARTRATE AND ACETAMINOPHEN 7.5; 325 MG/1; MG/1
1 TABLET ORAL EVERY 4 HOURS PRN
Status: DISCONTINUED | OUTPATIENT
Start: 2022-07-02 | End: 2022-07-03 | Stop reason: HOSPADM

## 2022-07-02 RX ORDER — MORPHINE SULFATE 4 MG/ML
2 INJECTION INTRAVENOUS EVERY 4 HOURS PRN
Status: DISCONTINUED | OUTPATIENT
Start: 2022-07-02 | End: 2022-07-03 | Stop reason: HOSPADM

## 2022-07-02 RX ADMIN — SODIUM CHLORIDE, PRESERVATIVE FREE 10 ML: 5 INJECTION INTRAVENOUS at 22:11

## 2022-07-02 RX ADMIN — ENOXAPARIN SODIUM 40 MG: 40 INJECTION SUBCUTANEOUS at 10:28

## 2022-07-02 RX ADMIN — VANCOMYCIN HYDROCHLORIDE 1000 MG: 1 INJECTION, POWDER, LYOPHILIZED, FOR SOLUTION INTRAVENOUS at 21:51

## 2022-07-02 RX ADMIN — VANCOMYCIN HYDROCHLORIDE 1000 MG: 1 INJECTION, POWDER, LYOPHILIZED, FOR SOLUTION INTRAVENOUS at 10:30

## 2022-07-02 RX ADMIN — SODIUM CHLORIDE, PRESERVATIVE FREE 10 ML: 5 INJECTION INTRAVENOUS at 10:30

## 2022-07-02 RX ADMIN — ACETAMINOPHEN 650 MG: 325 TABLET ORAL at 21:49

## 2022-07-02 ASSESSMENT — PAIN SCALES - GENERAL
PAINLEVEL_OUTOF10: 0
PAINLEVEL_OUTOF10: 3
PAINLEVEL_OUTOF10: 0
PAINLEVEL_OUTOF10: 0

## 2022-07-02 ASSESSMENT — PAIN DESCRIPTION - ORIENTATION: ORIENTATION: LEFT

## 2022-07-02 ASSESSMENT — PAIN DESCRIPTION - LOCATION: LOCATION: BREAST

## 2022-07-02 ASSESSMENT — PAIN DESCRIPTION - DESCRIPTORS: DESCRIPTORS: THROBBING

## 2022-07-02 NOTE — H&P
Hospitalist History and Physical   Admit Date:  2022  6:37 PM   Name:  Desiree Moreno   Age:  62 y.o. Sex:  female  :  1960   MRN:  [id number]   Room:  ER    Presenting Complaint: Cellulitis     Reason(s) for Admission: Cellulitis of breast [N61.0]     History of Present Illness:     62years old female with no major past medical problems presented to emergency room with bilateral breast redness, swelling left more than right started around 4 days ago, patient went to urgent care where patient underwent incision and drainage, pus was drained, recommended to take antibiotics but patient did not get antibiotics until next day, started taking doxycycline but no improvement so presented to urgent care today, patient was referred to ER for further evaluation. Patient reports \"sleepy\" all the time, reports low-grade fever, generalized weakness. Reports secretions from left axillary incision draining all day. Evaluated in emergency room, CT scan of chest was obtained consistent shows evidence of cellulitis. Review of Systems:  10 systems reviewed and negative except as noted in HPI. Assessment & Plan:     Principal Problem:      Cellulitis of breast : CT did not show clear abscess but incision and drainage site still draining purulent discharge. Will request general surgery to evaluate. Initiated on broad-spectrum antibiotics with vancomycin, cefepime. Follow blood cultures. Will provide pain medications. Dispo/Discharge Planning: Home with family. Diet: ADULT DIET; Regular  VTE ppx: Lovenox  Code status: Full Code    Hospital Problems:  Principal Problem:    Cellulitis of breast  Resolved Problems:    * No resolved hospital problems. *       Past History:       Past medical history: No major past medical problems. Past surgical history: No major past surgical issues. Social history: No history of smoking, history of alcohol abuse [place] drug abuse.   Family history: History of hypertension runs in family. Past Surgical History:   Procedure Laterality Date    VARICOSE VEIN SURGERY Left 2009      Social History     Tobacco Use    Smoking status: Never Smoker    Smokeless tobacco: Never Used   Substance Use Topics    Alcohol use: Yes     Alcohol/week: 1.0 standard drink     Types: 1 Shots of liquor per week     Comment: Every 3 weeks      Social History     Substance and Sexual Activity   Drug Use Never     No family history on file. Family history reviewed and negative except as noted above. There is no immunization history on file for this patient. No Known Allergies  Prior to Admit Medications:  Current Outpatient Medications   Medication Instructions    doxycycline hyclate (VIBRA-TABS) 100 mg, Oral, 2 TIMES DAILY         Objective:     Patient Vitals for the past 24 hrs:   Temp Pulse Resp BP SpO2   07/01/22 2219 -- 71 13 118/63 95 %   07/01/22 2203 97.9 °F (36.6 °C) 73 15 134/74 97 %   07/01/22 2159 -- 68 14 134/74 96 %   07/01/22 2149 -- 70 16 133/76 96 %   07/01/22 2129 -- 71 15 124/74 98 %   07/01/22 2119 -- 71 13 126/75 98 %   07/01/22 2059 -- 74 22 117/64 97 %   07/01/22 2049 -- 69 17 121/76 98 %   07/01/22 2019 -- -- -- 113/72 96 %   07/01/22 1959 -- 62 13 94/60 94 %   07/01/22 1941 -- 63 14 111/69 94 %   07/01/22 1931 -- 70 15 111/61 96 %   07/01/22 1921 -- 72 16 -- 95 %   07/01/22 1915 -- 77 22 105/89 95 %   07/01/22 1911 -- 69 14 -- 95 %   07/01/22 1901 -- -- -- 114/70 95 %   07/01/22 1830 -- 70 16 107/65 95 %   07/01/22 1733 98.3 °F (36.8 °C) 85 20 118/70 98 %       Oxygen Therapy  SpO2: 95 %    Estimated body mass index is 20.18 kg/m² as calculated from the following:    Height as of this encounter: 5' 6\" (1.676 m). Weight as of this encounter: 125 lb (56.7 kg). No intake or output data in the 24 hours ending 07/01/22 2238      Physical Exam:    Blood pressure 118/63, pulse 71, temperature 97.9 °F (36.6 °C), temperature source Oral, resp. rate 13, height 5' 6\" (1.676 m), weight 125 lb (56.7 kg), SpO2 95 %. General:    Well nourished. Head:  Normocephalic, atraumatic  Eyes:  Sclerae appear normal.  Pupils equally round. ENT:  Nares appear normal, no drainage. Moist oral mucosa  Neck:  No restricted ROM. Trachea midline   CV:   RRR. No m/r/g. No jugular venous distension. Lungs:   CTAB. No wheezing, rhonchi, or rales. Respirations even, unlabored  Abdomen: Bowel sounds present. Soft, nontender, nondistended. Extremities: No cyanosis or clubbing. No edema  Skin:     No rashes and normal coloration. Warm and dry. Neuro:   A&Ox3  Psych:  Normal mood and affect.       I have reviewed ordered lab tests and independently visualized imaging below:    Last 24hr Labs:  Recent Results (from the past 24 hour(s))   Lactic Acid    Collection Time: 07/01/22  5:56 PM   Result Value Ref Range    Lactic Acid, Plasma 0.6 0.4 - 2.0 MMOL/L   CBC with Auto Differential    Collection Time: 07/01/22  5:56 PM   Result Value Ref Range    WBC 3.3 (L) 4.3 - 11.1 K/uL    RBC 3.78 (L) 4.05 - 5.2 M/uL    Hemoglobin 10.8 (L) 11.7 - 15.4 g/dL    Hematocrit 32.7 (L) 35.8 - 46.3 %    MCV 86.5 79.6 - 97.8 FL    MCH 28.6 26.1 - 32.9 PG    MCHC 33.0 31.4 - 35.0 g/dL    RDW 14.6 11.9 - 14.6 %    Platelets 442 136 - 658 K/uL    MPV 10.7 9.4 - 12.3 FL    nRBC 0.00 0.0 - 0.2 K/uL    Differential Type AUTOMATED      Seg Neutrophils 71 43 - 78 %    Lymphocytes 15 13 - 44 %    Monocytes 12 4.0 - 12.0 %    Eosinophils % 1 0.5 - 7.8 %    Basophils 0 0.0 - 2.0 %    Immature Granulocytes 1 0.0 - 5.0 %    Segs Absolute 2.3 1.7 - 8.2 K/UL    Absolute Lymph # 0.5 0.5 - 4.6 K/UL    Absolute Mono # 0.4 0.1 - 1.3 K/UL    Absolute Eos # 0.0 0.0 - 0.8 K/UL    Basophils Absolute 0.0 0.0 - 0.2 K/UL    Absolute Immature Granulocyte 0.0 0.0 - 0.5 K/UL   CMP    Collection Time: 07/01/22  5:56 PM   Result Value Ref Range    Sodium 137 136 - 145 mmol/L    Potassium 3.4 (L) 3.5 - 5.1 mmol/L Chloride 104 98 - 107 mmol/L    CO2 24 21 - 32 mmol/L    Anion Gap 9 7 - 16 mmol/L    Glucose 83 65 - 100 mg/dL    BUN 16 8 - 23 MG/DL    CREATININE 0.50 (L) 0.6 - 1.0 MG/DL    GFR African American >60 >60 ml/min/1.73m2    GFR Non- >60 >60 ml/min/1.73m2    Calcium 8.6 8.3 - 10.4 MG/DL    Total Bilirubin 0.5 0.2 - 1.1 MG/DL    ALT 33 12 - 65 U/L    AST 4 (L) 15 - 37 U/L    Alk Phosphatase 75 50 - 136 U/L    Total Protein 7.1 6.3 - 8.2 g/dL    Albumin 2.7 (L) 3.2 - 4.6 g/dL    Globulin 4.4 (H) 2.3 - 3.5 g/dL    Albumin/Globulin Ratio 0.6 (L) 1.2 - 3.5     Procalcitonin    Collection Time: 07/01/22  5:56 PM   Result Value Ref Range    Procalcitonin 0.24 0.00 - 0.49 ng/mL   EKG 12 Lead    Collection Time: 07/01/22  7:07 PM   Result Value Ref Range    Ventricular Rate 67 BPM    Atrial Rate 69 BPM    QRS Duration 97 ms    Q-T Interval 414 ms    QTc Calculation (Bazett) 437 ms    R Axis 28 degrees    T Axis 39 degrees    Diagnosis Atrial flutter with predominant 3:1 AV block        Other Studies:  CT CHEST W CONTRAST    Result Date: 7/1/2022  EXAM: Chest CT with contrast. INDICATION: Left-sided breast pain. Bilateral axillary abscesses status post I and D 2 days prior. COMPARISON: Same day chest radiograph. Multiple axial images were obtained through the chest after intravenous injection of 80 mL of Isovue 370. Radiation dose reduction techniques were used for this study: All CT scans performed at this facility use one or all of the following: Automated exposure control, adjustment of the mA and/or kVp according to patient's size, iterative reconstruction. FINDINGS: LUNGS/PLEURA: The central airways are patent. The lungs are within normal limits. No suspicious pulmonary nodules. No pleural effusion or pneumothorax. No focal pleural lesion. MEDIASTINUM: The thoracic aorta and main pulmonary artery are within normal limits. The heart is normal in size. No pericardial effusion. No suspicious thyroid nodule. The esophagus is normal. No pathologic adenopathy. BONES AND SOFT TISSUES: Bilateral axillary subcutaneous soft tissue fat stranding and small pockets of air consistent with recent bilateral axillary incision and drainages. There appears to be a small drain of the left axilla. There is soft tissue thickening without a clear organized fluid collection of the left or right axilla. There is asymmetric diffuse trabecular thickening and skin thickening with edematous change of the left breast, query mastitis. UPPER ABDOMEN: Unremarkable. Bilateral axillary subcutaneous soft tissue fat stranding and small pockets of air consistent with recent bilateral axillary incision and drainages. There appears to be a small drain of the left axilla. There is soft tissue thickening without a clear organized fluid collection of the left or right axilla. There is asymmetric diffuse trabecular thickening and skin thickening with edematous change of the left breast, query mastitis. Patient should be evaluated in the breast center if not already seen preferably a few days after antibiotic therapy within the next 2-3 weeks to ensure no evidence of inflammatory breast cancer. XR CHEST PORTABLE    Result Date: 7/1/2022  EXAM: Single view chest radiograph. INDICATION: Left-sided breast pain. COMPARISON: None. FINDINGS: No focal lung consolidation. No pneumothorax. No pleural effusion. The heart is normal in size. No evidence of acute osseous abnormality. 1. No acute cardiopulmonary abnormality. Echocardiogram:  No results found for this or any previous visit.       Meds previously ordered:  Orders Placed This Encounter   Medications    0.9 % sodium chloride bolus    clindamycin (CLEOCIN) 900 mg in dextrose 5 % 50 mL IVPB     Order Specific Question:   Antimicrobial Indications     Answer:   Skin and Soft Tissue Infection    vancomycin (VANCOCIN) 1,000 mg in sodium chloride 0.9 % 250 mL IVPB ([id number])     Order Specific Question:   Antimicrobial Indications     Answer:   Skin and Soft Tissue Infection    sodium chloride flush 0.9 % injection 5-40 mL    sodium chloride flush 0.9 % injection 5-40 mL    0.9 % sodium chloride infusion    enoxaparin (LOVENOX) injection 40 mg     Order Specific Question:   Indication of Use     Answer:   Prophylaxis-DVT/PE    OR Linked Order Group     ondansetron (ZOFRAN-ODT) disintegrating tablet 4 mg     ondansetron (ZOFRAN) injection 4 mg    polyethylene glycol (GLYCOLAX) packet 17 g    OR Linked Order Group     acetaminophen (TYLENOL) tablet 650 mg     acetaminophen (TYLENOL) suppository 650 mg    cefepime (MAXIPIME) 2000 mg IVPB minibag in NS     Order Specific Question:   Antimicrobial Indications     Answer:   Skin and Soft Tissue Infection     Order Specific Question:   Skin duration of therapy     Answer:   7 days    cefepime (MAXIPIME) 2000 mg IVPB minibag in NS     Order Specific Question:   Antimicrobial Indications     Answer:   Skin and Soft Tissue Infection     Order Specific Question:   Skin duration of therapy     Answer:   5 days           Signed:  Fabian Perry MD    Part of this note may have been written by using a voice dictation software. The note has been proof read but may still contain some grammatical/other typographical errors.

## 2022-07-02 NOTE — PROGRESS NOTES
Hospitalist   Admit Date:  2022  6:37 PM   Name:  Franc Lindsey   Age:  62 y.o. Sex:  female  :  1960   MRN:  [id number]   Room:  [facility]      History of Present Illness:     62years old female with no major past medical problems presented to emergency room with bilateral breast redness, swelling left more than right started around 4 days ago, patient went to urgent care where patient underwent incision and drainage, pus was drained, recommended to take antibiotics but patient did not get antibiotics until next day, started taking doxycycline but no improvement so presented to urgent care today, patient was referred to ER for further evaluation. Patient reports \"sleepy\" all the time, reports low-grade fever, generalized weakness. Reports secretions from left axillary incision draining all day. Evaluated in emergency room, CT scan of chest was obtained consistent shows evidence of cellulitis. Today, no fever or chills, redness of L breast is mildly better, pain controlled    Assessment & Plan:     Cellulitis of breast : CT did not show clear abscess but incision and drainage site still draining purulent discharge. Has labial lesion w/ pustular discharge in last few weeks which is better. No other hx of skin infections.      Rx:  Vancomycin iv  Surgery consulted  Follow blood CS      Dispo: home    Objective:     Patient Vitals for the past 24 hrs:   Temp Pulse Resp BP SpO2   22 1215 98.1 °F (36.7 °C) 79 16 107/67 97 %   22 0745 98.5 °F (36.9 °C) 80 16 124/78 94 %   22 0445 97.8 °F (36.6 °C) 72 20 111/66 96 %   22 225 -- 75 16 116/78 96 %   22 2245 98.1 °F (36.7 °C) -- -- -- --   22 -- 71 13 118/63 95 %   22 2203 97.9 °F (36.6 °C) 73 15 134/74 97 %   22 -- 68 14 134/74 96 %   22 -- 70 16 133/76 96 %   22 -- 71 15 124/74 98 %   22 -- 71 13 126/75 98 %   22 -- 74 22 117/64 97 %   22 2049 -- 69 17 121/76 98 %   07/01/22 2019 -- -- -- 113/72 96 %   07/01/22 1959 -- 62 13 94/60 94 %   07/01/22 1941 -- 63 14 111/69 94 %   07/01/22 1931 -- 70 15 111/61 96 %   07/01/22 1921 -- 72 16 -- 95 %   07/01/22 1915 -- 77 22 105/89 95 %   07/01/22 1911 -- 69 14 -- 95 %   07/01/22 1901 -- -- -- 114/70 95 %   07/01/22 1830 -- 70 16 107/65 95 %   07/01/22 1733 98.3 °F (36.8 °C) 85 20 118/70 98 %       Oxygen Therapy  SpO2: 97 %  Pulse Oximeter Device Mode: Intermittent  O2 Device: None (Room air)    Estimated body mass index is 20.18 kg/m² as calculated from the following:    Height as of this encounter: 5' 6\" (1.676 m). Weight as of this encounter: 125 lb (56.7 kg). Intake/Output Summary (Last 24 hours) at 7/2/2022 1224  Last data filed at 7/1/2022 2245  Gross per 24 hour   Intake 50 ml   Output 400 ml   Net -350 ml         Physical Exam:    Blood pressure 107/67, pulse 79, temperature 98.1 °F (36.7 °C), temperature source Oral, resp. rate 16, height 5' 6\" (1.676 m), weight 125 lb (56.7 kg), SpO2 97 %. General:    Well nourished. Moderate distress  CV:   RRR. No m/r/g. Lungs:   CTAB. No wheezing, rhonchi, or rales. Respirations even, unlabored  Abdomen: Bowel sounds present. Soft, nontender, nondistended. Extremities: No cyanosis or clubbing. No edema  Skin:     R labial 0.2 cm lesion w/ pus. L breast redness laterally w/ extension to axilla which is indurated at incision site. R axillary redness w/ induration  Neuro:   A&Ox3  Psych:  Normal mood and affect.       I have reviewed ordered lab tests and independently visualized imaging below:    Last 24hr Labs:  Recent Results (from the past 24 hour(s))   Lactic Acid    Collection Time: 07/01/22  5:56 PM   Result Value Ref Range    Lactic Acid, Plasma 0.6 0.4 - 2.0 MMOL/L   CBC with Auto Differential    Collection Time: 07/01/22  5:56 PM   Result Value Ref Range    WBC 3.3 (L) 4.3 - 11.1 K/uL    RBC 3.78 (L) 4.05 - 5.2 M/uL    Hemoglobin 10.8 (L) 11.7 - 15.4 g/dL    Hematocrit 32.7 (L) 35.8 - 46.3 %    MCV 86.5 79.6 - 97.8 FL    MCH 28.6 26.1 - 32.9 PG    MCHC 33.0 31.4 - 35.0 g/dL    RDW 14.6 11.9 - 14.6 %    Platelets 444 233 - 568 K/uL    MPV 10.7 9.4 - 12.3 FL    nRBC 0.00 0.0 - 0.2 K/uL    Differential Type AUTOMATED      Seg Neutrophils 71 43 - 78 %    Lymphocytes 15 13 - 44 %    Monocytes 12 4.0 - 12.0 %    Eosinophils % 1 0.5 - 7.8 %    Basophils 0 0.0 - 2.0 %    Immature Granulocytes 1 0.0 - 5.0 %    Segs Absolute 2.3 1.7 - 8.2 K/UL    Absolute Lymph # 0.5 0.5 - 4.6 K/UL    Absolute Mono # 0.4 0.1 - 1.3 K/UL    Absolute Eos # 0.0 0.0 - 0.8 K/UL    Basophils Absolute 0.0 0.0 - 0.2 K/UL    Absolute Immature Granulocyte 0.0 0.0 - 0.5 K/UL   CMP    Collection Time: 07/01/22  5:56 PM   Result Value Ref Range    Sodium 137 136 - 145 mmol/L    Potassium 3.4 (L) 3.5 - 5.1 mmol/L    Chloride 104 98 - 107 mmol/L    CO2 24 21 - 32 mmol/L    Anion Gap 9 7 - 16 mmol/L    Glucose 83 65 - 100 mg/dL    BUN 16 8 - 23 MG/DL    CREATININE 0.50 (L) 0.6 - 1.0 MG/DL    GFR African American >60 >60 ml/min/1.73m2    GFR Non- >60 >60 ml/min/1.73m2    Calcium 8.6 8.3 - 10.4 MG/DL    Total Bilirubin 0.5 0.2 - 1.1 MG/DL    ALT 33 12 - 65 U/L    AST 4 (L) 15 - 37 U/L    Alk Phosphatase 75 50 - 136 U/L    Total Protein 7.1 6.3 - 8.2 g/dL    Albumin 2.7 (L) 3.2 - 4.6 g/dL    Globulin 4.4 (H) 2.3 - 3.5 g/dL    Albumin/Globulin Ratio 0.6 (L) 1.2 - 3.5     Procalcitonin    Collection Time: 07/01/22  5:56 PM   Result Value Ref Range    Procalcitonin 0.24 0.00 - 0.49 ng/mL   EKG 12 Lead    Collection Time: 07/01/22  7:07 PM   Result Value Ref Range    Ventricular Rate 67 BPM    Atrial Rate 69 BPM    QRS Duration 97 ms    Q-T Interval 414 ms    QTc Calculation (Bazett) 437 ms    R Axis 28 degrees    T Axis 39 degrees    Diagnosis Atrial flutter with predominant 3:1 AV block    Comprehensive Metabolic Panel w/ Reflex to MG    Collection Time: 07/02/22  4:05 AM   Result Value Ref Range    Sodium 142 136 - 145 mmol/L    Potassium 3.7 3.5 - 5.1 mmol/L    Chloride 110 (H) 98 - 107 mmol/L    CO2 27 21 - 32 mmol/L    Anion Gap 5 (L) 7 - 16 mmol/L    Glucose 82 65 - 100 mg/dL    BUN 14 8 - 23 MG/DL    CREATININE 0.60 0.6 - 1.0 MG/DL    GFR African American >60 >60 ml/min/1.73m2    GFR Non- >60 >60 ml/min/1.73m2    Calcium 8.0 (L) 8.3 - 10.4 MG/DL    Total Bilirubin 0.4 0.2 - 1.1 MG/DL    ALT 31 12 - 65 U/L    AST 43 (H) 15 - 37 U/L    Alk Phosphatase 67 50 - 136 U/L    Total Protein 6.2 (L) 6.3 - 8.2 g/dL    Albumin 2.3 (L) 3.2 - 4.6 g/dL    Globulin 3.9 (H) 2.3 - 3.5 g/dL    Albumin/Globulin Ratio 0.6 (L) 1.2 - 3.5         Other Studies:  CT CHEST W CONTRAST    Result Date: 7/1/2022  EXAM: Chest CT with contrast. INDICATION: Left-sided breast pain. Bilateral axillary abscesses status post I and D 2 days prior. COMPARISON: Same day chest radiograph. Multiple axial images were obtained through the chest after intravenous injection of 80 mL of Isovue 370. Radiation dose reduction techniques were used for this study: All CT scans performed at this facility use one or all of the following: Automated exposure control, adjustment of the mA and/or kVp according to patient's size, iterative reconstruction. FINDINGS: LUNGS/PLEURA: The central airways are patent. The lungs are within normal limits. No suspicious pulmonary nodules. No pleural effusion or pneumothorax. No focal pleural lesion. MEDIASTINUM: The thoracic aorta and main pulmonary artery are within normal limits. The heart is normal in size. No pericardial effusion. No suspicious thyroid nodule. The esophagus is normal. No pathologic adenopathy. BONES AND SOFT TISSUES: Bilateral axillary subcutaneous soft tissue fat stranding and small pockets of air consistent with recent bilateral axillary incision and drainages. There appears to be a small drain of the left axilla.  There is soft tissue thickening without a clear organized fluid collection of the left or right axilla. There is asymmetric diffuse trabecular thickening and skin thickening with edematous change of the left breast, query mastitis. UPPER ABDOMEN: Unremarkable. Bilateral axillary subcutaneous soft tissue fat stranding and small pockets of air consistent with recent bilateral axillary incision and drainages. There appears to be a small drain of the left axilla. There is soft tissue thickening without a clear organized fluid collection of the left or right axilla. There is asymmetric diffuse trabecular thickening and skin thickening with edematous change of the left breast, query mastitis. Patient should be evaluated in the breast center if not already seen preferably a few days after antibiotic therapy within the next 2-3 weeks to ensure no evidence of inflammatory breast cancer. XR CHEST PORTABLE    Result Date: 7/1/2022  EXAM: Single view chest radiograph. INDICATION: Left-sided breast pain. COMPARISON: None. FINDINGS: No focal lung consolidation. No pneumothorax. No pleural effusion. The heart is normal in size. No evidence of acute osseous abnormality. 1. No acute cardiopulmonary abnormality. Echocardiogram:  No results found for this or any previous visit.       Meds previously ordered:  Orders Placed This Encounter   Medications    0.9 % sodium chloride bolus    clindamycin (CLEOCIN) 900 mg in dextrose 5 % 50 mL IVPB     Order Specific Question:   Antimicrobial Indications     Answer:   Skin and Soft Tissue Infection    vancomycin (VANCOCIN) 1,000 mg in sodium chloride 0.9 % 250 mL IVPB ([id number])     Order Specific Question:   Antimicrobial Indications     Answer:   Skin and Soft Tissue Infection    sodium chloride flush 0.9 % injection 5-40 mL    sodium chloride flush 0.9 % injection 5-40 mL    0.9 % sodium chloride infusion    enoxaparin (LOVENOX) injection 40 mg     Order Specific Question:   Indication of Use     Answer: Prophylaxis-DVT/PE    OR Linked Order Group     ondansetron (ZOFRAN-ODT) disintegrating tablet 4 mg     ondansetron (ZOFRAN) injection 4 mg    polyethylene glycol (GLYCOLAX) packet 17 g    OR Linked Order Group     acetaminophen (TYLENOL) tablet 650 mg     acetaminophen (TYLENOL) suppository 650 mg    DISCONTD: cefepime (MAXIPIME) 2000 mg IVPB minibag in NS     Order Specific Question:   Antimicrobial Indications     Answer:   Skin and Soft Tissue Infection     Order Specific Question:   Skin duration of therapy     Answer:   7 days    cefepime (MAXIPIME) 2000 mg IVPB minibag in NS     Order Specific Question:   Antimicrobial Indications     Answer:   Skin and Soft Tissue Infection     Order Specific Question:   Skin duration of therapy     Answer:   5 days    DISCONTD: morphine injection 4 mg    vancomycin (VANCOCIN) 1,000 mg in sodium chloride 0.9 % 250 mL IVPB ([id number])     Order Specific Question:   Antimicrobial Indications     Answer:   Skin and Soft Tissue Infection     Order Specific Question:   Skin duration of therapy     Answer:   7 days    HYDROcodone-acetaminophen (NORCO) 7.5-325 MG per tablet 1 tablet    morphine injection 2 mg           Signed:  Candido Luevano MD

## 2022-07-02 NOTE — ED NOTES
TRANSFER - OUT REPORT:    Verbal report given to  RN on Mikal Whitt  being transferred to 3rd floor for routine progression of patient care       Report consisted of patient's Situation, Background, Assessment and   Recommendations(SBAR). Information from the following report(s) ED SBAR was reviewed with the receiving nurse. Lines:   Peripheral IV 07/01/22 Left Hand (Active)        Opportunity for questions and clarification was provided.       Patient transported with:  Patient-specific medications from Pharmacy and Nae Fuller [address]  07/01/22 5046

## 2022-07-02 NOTE — CONSULTS
H&P/Consult Note/Progress Note/Office Note:   Kenzie Carmen  MRN: [id number]  :1960  Age:61 y.o.    HPI: Kenzie Carmen is a 62 y.o. with no major past medical problems presented to emergency room with bilateral breast redness, swelling left more than right started around 4 days ago, patient went to urgent care where patient underwent incision and drainage, pus was drained, recommended to take antibiotics but patient did not get antibiotics until next day, started taking doxycycline but no improvement so presented to urgent care today, patient was referred to ER for further evaluation. No past medical history on file.   Past Surgical History:   Procedure Laterality Date    VARICOSE VEIN SURGERY Left      Current Facility-Administered Medications   Medication Dose Route Frequency    sodium chloride flush 0.9 % injection 5-40 mL  5-40 mL IntraVENous 2 times per day    sodium chloride flush 0.9 % injection 5-40 mL  5-40 mL IntraVENous PRN    0.9 % sodium chloride infusion   IntraVENous PRN    enoxaparin (LOVENOX) injection 40 mg  40 mg SubCUTAneous Daily    ondansetron (ZOFRAN-ODT) disintegrating tablet 4 mg  4 mg Oral Q8H PRN    Or    ondansetron (ZOFRAN) injection 4 mg  4 mg IntraVENous Q6H PRN    polyethylene glycol (GLYCOLAX) packet 17 g  17 g Oral Daily PRN    acetaminophen (TYLENOL) tablet 650 mg  650 mg Oral Q6H PRN    Or    acetaminophen (TYLENOL) suppository 650 mg  650 mg Rectal Q6H PRN    cefepime (MAXIPIME) 2000 mg IVPB minibag in NS  2,000 mg IntraVENous Q12H    morphine injection 4 mg  4 mg IntraVENous Q4H PRN    vancomycin (VANCOCIN) 1,000 mg in sodium chloride 0.9 % 250 mL IVPB ([id number])  1,000 mg IntraVENous Q12H     Facility-Administered Medications Ordered in Other Encounters   Medication Dose Route Frequency    0.9 % sodium chloride bolus  100 mL IntraVENous ONCE PRN    sodium chloride flush 0.9 % injection 10 mL  10 mL IntraVENous ONCE PRN     Patient has no known allergies. Social History     Socioeconomic History    Marital status:      Spouse name: Not on file    Number of children: Not on file    Years of education: Not on file    Highest education level: Not on file   Occupational History    Not on file   Tobacco Use    Smoking status: Never Smoker    Smokeless tobacco: Never Used   Vaping Use    Vaping Use: Never used   Substance and Sexual Activity    Alcohol use: Yes     Alcohol/week: 1.0 standard drink     Types: 1 Shots of liquor per week     Comment: Every 3 weeks    Drug use: Never    Sexual activity: Yes     Partners: Male   Other Topics Concern    Not on file   Social History Narrative    Not on file     Social Determinants of Health     Financial Resource Strain:     Difficulty of Paying Living Expenses: Not on file   Food Insecurity:     Worried About Running Out of Food in the Last Year: Not on file    Billy of Food in the Last Year: Not on file   Transportation Needs:     Lack of Transportation (Medical): Not on file    Lack of Transportation (Non-Medical):  Not on file   Physical Activity:     Days of Exercise per Week: Not on file    Minutes of Exercise per Session: Not on file   Stress:     Feeling of Stress : Not on file   Social Connections:     Frequency of Communication with Friends and Family: Not on file    Frequency of Social Gatherings with Friends and Family: Not on file    Attends Druze Services: Not on file    Active Member of Clubs or Organizations: Not on file    Attends Club or Organization Meetings: Not on file    Marital Status: Not on file   Intimate Partner Violence:     Fear of Current or Ex-Partner: Not on file    Emotionally Abused: Not on file    Physically Abused: Not on file    Sexually Abused: Not on file   Housing Stability:     Unable to Pay for Housing in the Last Year: Not on file    Number of [place] in the Last Year: Not on file    Unstable Housing in the Last Year: Not on file     Social History     Tobacco Use   Smoking Status Never Smoker   Smokeless Tobacco Never Used     No family history on file. ROS: The patient has no difficulty with chest pain or shortness of breath. No fever or chills. Comprehensive review of systems was otherwise unremarkable except as noted above. Physical Exam:   /78   Pulse 80   Temp 98.5 °F (36.9 °C) (Oral)   Resp 16   Ht 5' 6\" (1.676 m)   Wt 125 lb (56.7 kg)   SpO2 94%   BMI 20.18 kg/m²   Vitals:    07/01/22 2245 07/01/22 2251 07/02/22 0445 07/02/22 0745   BP:  116/78 111/66 124/78   Pulse:  75 72 80   Resp:  16 20 16   Temp: 98.1 °F (36.7 °C)  97.8 °F (36.6 °C) 98.5 °F (36.9 °C)   TempSrc: Oral   Oral   SpO2:  96% 96% 94%   Weight:       Height:         No intake/output data recorded. 06/30 1901 - 07/02 0700  In: 48 [P.O.:50]  Out: 400 [Urine:400]    Constitutional: Alert, oriented, cooperative patient in no acute distress; appears stated age    Eyes:Sclera are clear. EOMs intact  ENMT: no external lesions gross hearing normal; no obvious neck masses, no ear or lip lesions, nares normal  CV: RRR. Normal perfusion  Resp: No JVD. Breathing is  non-labored; no audible wheezing. GI: soft and non-distended     Musculoskeletal: unremarkable with normal function. No embolic signs or cyanosis.    Neuro:  Oriented; moves all 4; no focal deficits  Psychiatric: normal affect and mood, no memory impairment  Left upper outer quadrant of left breast has an indurated area, with fluctuance and erythema concerning for an abscess   Recent vitals (if inpt):  Patient Vitals for the past 24 hrs:   BP Temp Temp src Pulse Resp SpO2 Height Weight   07/02/22 0745 124/78 98.5 °F (36.9 °C) Oral 80 16 94 % -- --   07/02/22 0445 111/66 97.8 °F (36.6 °C) -- 72 20 96 % -- --   07/01/22 2251 116/78 -- -- 75 16 96 % -- --   07/01/22 2245 -- 98.1 °F (36.7 °C) Oral -- -- -- -- --   07/01/22 2219 118/63 -- -- 71 13 95 % -- --   07/01/22 2203 134/74 97.9 °F (36.6 °C) Oral 73 15 97 % -- --   07/01/22 2159 134/74 -- -- 68 14 96 % -- --   07/01/22 2149 133/76 -- -- 70 16 96 % -- --   07/01/22 2129 124/74 -- -- 71 15 98 % -- --   07/01/22 2119 126/75 -- -- 71 13 98 % -- --   07/01/22 2059 117/64 -- -- 74 22 97 % -- --   07/01/22 2049 121/76 -- -- 69 17 98 % -- --   07/01/22 2019 113/72 -- -- -- -- 96 % -- --   07/01/22 1959 94/60 -- -- 62 13 94 % -- --   07/01/22 1941 111/69 -- -- 63 14 94 % -- --   07/01/22 1931 111/61 -- -- 70 15 96 % -- --   07/01/22 1921 -- -- -- 72 16 95 % -- --   07/01/22 1918 -- -- -- -- -- -- 5' 6\" (1.676 m) 125 lb (56.7 kg)   07/01/22 1915 105/89 -- -- 77 22 95 % -- --   07/01/22 1911 -- -- -- 69 14 95 % -- --   07/01/22 1901 114/70 -- -- -- -- 95 % -- --   07/01/22 1830 107/65 -- -- 70 16 95 % -- --   07/01/22 1733 118/70 98.3 °F (36.8 °C) Oral 85 20 98 % 5' 7\" (1.702 m) 125 lb (56.7 kg)       Amount and/or Complexity of Data Reviewed and Analyzed:  I reviewed and analyzed all of the unique labs and radiologic studies that are shown below as well as any that are in the HPI, and any that are in the expanded problem list below  *Each unique test, order, or document contributes to the combination of 2 or combination of 3 in Category 1 below. For this visit I also reviewed old records and prior notes. Recent Labs     07/01/22  1756 07/01/22  1756 07/02/22  0405   WBC 3.3*  --   --    HGB 10.8*  --   --      --   --       < > 142   K 3.4*   < > 3.7      < > 110*   CO2 24   < > 27   BUN 16   < > 14   ALT 33   < > 31    < > = values in this interval not displayed.      Review of most recent CBC  Lab Results   Component Value Date    WBC 3.3 (L) 07/01/2022    HGB 10.8 (L) 07/01/2022    HCT 32.7 (L) 07/01/2022    MCV 86.5 07/01/2022     07/01/2022       Review of most recent BMP  Lab Results   Component Value Date/Time     07/02/2022 04:05 AM    K 3.7 07/02/2022 04:05 AM     07/02/2022 04:05 AM    CO2 27 07/02/2022 04:05 AM    BUN 14 07/02/2022 04:05 AM    CREATININE 0.60 07/02/2022 04:05 AM    GLUCOSE 82 07/02/2022 04:05 AM    CALCIUM 8.0 07/02/2022 04:05 AM        Review of most recent LFTs (and lipase if done)  Lab Results   Component Value Date    ALT 31 07/02/2022    AST 43 (H) 07/02/2022    ALKPHOS 67 07/02/2022    BILITOT 0.4 07/02/2022     No results found for: LIPASE    No results found for: INR, APTT, LCAD    Review of most recent HgbA1c  No results found for: LABA1C  No results found for: EAG    Nutritional assessment screen for wound healing issues:  Lab Results   Component Value Date    LABALBU 2.3 (L) 07/02/2022       @lastcovr@    Xray Result (most recent):  XR CHEST PORTABLE 07/01/2022    Narrative  EXAM: Single view chest radiograph. INDICATION: Left-sided breast pain. COMPARISON: None. FINDINGS:  No focal lung consolidation. No pneumothorax. No pleural effusion. The heart is  normal in size. No evidence of acute osseous abnormality. Impression  1. No acute cardiopulmonary abnormality. CT Result (most recent):  CT CHEST W CONTRAST 07/01/2022    Narrative  EXAM: Chest CT with contrast.  INDICATION: Left-sided breast pain. Bilateral axillary abscesses status post I  and D 2 days prior. COMPARISON: Same day chest radiograph. Multiple axial images were obtained through the chest after intravenous  injection of 80 mL of Isovue 370. Radiation dose reduction techniques were used  for this study: All CT scans performed at this facility use one or all of the  following: Automated exposure control, adjustment of the mA and/or kVp according  to patient's size, iterative reconstruction. FINDINGS:  LUNGS/PLEURA:  The central airways are patent. The lungs are within normal limits. No  suspicious pulmonary nodules. No pleural effusion or pneumothorax. No focal  pleural lesion. MEDIASTINUM:  The thoracic aorta and main pulmonary artery are within normal limits. The heart  is normal in size. No pericardial effusion. No suspicious thyroid nodule. The  esophagus is normal. No pathologic adenopathy. BONES AND SOFT TISSUES:  Bilateral axillary subcutaneous soft tissue fat stranding and small pockets of  air consistent with recent bilateral axillary incision and drainages. There  appears to be a small drain of the left axilla. There is soft tissue thickening  without a clear organized fluid collection of the left or right axilla. There is  asymmetric diffuse trabecular thickening and skin thickening with edematous  change of the left breast, query mastitis. UPPER ABDOMEN:  Unremarkable. Impression  Bilateral axillary subcutaneous soft tissue fat stranding and small pockets of  air consistent with recent bilateral axillary incision and drainages. There  appears to be a small drain of the left axilla. There is soft tissue thickening  without a clear organized fluid collection of the left or right axilla. There is  asymmetric diffuse trabecular thickening and skin thickening with edematous  change of the left breast, query mastitis. Patient should be evaluated in the  breast center if not already seen preferably a few days after antibiotic therapy  within the next 2-3 weeks to ensure no evidence of inflammatory breast cancer. US Result (most recent):  No results found for this or any previous visit from the past 3650 days.       Admission date (for inpatients): 7/1/2022           ASSESSMENT/PLAN:    Left axillary/upper outer breast abscess   Will perform and I&D in am as she already had breakfast    -continue IV antibiotics  -NPO after mid night           Signed: Eloy Peralta MD  7/2/2022    9:52 AM

## 2022-07-02 NOTE — PROGRESS NOTES
Skin assessment completed. Pt's skin is overall CDI, cellulitis present on both breasts. Left breast is more red and tender than the right. Sacrum and heels intact, no wounds noted. Will continue to monitor. Skin assessed with Kamryn Gill RN.

## 2022-07-02 NOTE — PROGRESS NOTES
Comprehensive Nutrition Assessment    Type and Reason for Visit: Initial,Positive Nutrition Screen  Malnutrition Screening Tool: Malnutrition Screen  Have you recently lost weight without trying?: 2 to 13 pounds (1 point)  Have you been eating poorly because of a decreased appetite?: Yes (1 point)  Malnutrition Screening Tool Score: 2    Nutrition Recommendations/Plan:   Meals and Snacks:  Diet: Continue current order  Nutrition Supplement Therapy:   Medical food supplement therapy:  Initiate Ensure Enlive three times per day (this provides 350 kcal and 20 grams protein per bottle)     Malnutrition Assessment:  Malnutrition Status: Insufficient data (reported wt loss, mild to moderate wasting)    Nutrition Assessment:  Nutrition History: Patient reports progressively declining PO since losing her job 2 years ago due to declining appetite related to stress. She states that monetary restraints have also impacted food availability. She states she eats 1 sometimes 2 meals per day consisting of cereal or soup. She states that she weighed ~160# when PO decline and weight loss started. Discussed wt loss over last month documented in recent Urgent Care wts (from 139# to 125# over last month). She states that they ask her weight and she is not exactly sure how much weight she has lost but can tell her clothes are fitting loosely. NFPE performed with mild to moderate wasting to clavicles and temples. Do You Have Any Cultural, Yarsani, or Ethnic Food Preferences?: No   Nutrition Background:   No significant PMH. She recently presented to Urgent care for bilateral axilla abscess and had I&D. Also with labial abscess and GYN referral made. She presented back with even more drainage and pain and was sent to ER. CT showed cellulitis and she was admitted. Plan for I&D 7/3. Nutrition Interval:  Patient seen reclined in bed. She states that she did eat all of her breakfast because she was so hungry.  She states that her general appetite remains somewhat depressed. Discussed starting Ensure and she asked what it was. Explained Ensure and indication for use. Explained if she eats a meal well no need to drink Ensure. She voiced understnading. Current Nutrition Therapies:  ADULT DIET; Regular  Diet NPO    Current Intake:   Average Meal Intake: %        Anthropometric Measures:  Height: 5' 6\" (167.6 cm)  Current Body Wt: 139 lb 5.3 oz (63.2 kg) (7/2), Weight source: Bed Scale  BMI: 22.5  Admission Body Weight: 125 lb (56.7 kg) (7/1 states)  Ideal Body Weight (Kg) (Calculated): 59 kg (130 lbs), 107.2 %  Usual Body Wt:  (no wt history, stated ~160# 2 years ago), Percent weight change:         Edema:Peripheral Vascular  Peripheral Vascular (WDL): Within Defined Limits   BMI Category Normal Weight (BMI 18.5-24. 9)  Estimated Daily Nutrient Needs:  Energy (kcal/day): 9126-4526 (Kcal/kg (25-30) Weight used: 63.2 kg Current  Protein (g/day): 63-76 (1-1.2) Weight Used: (Current) 63.2 kg  Fluid (ml/day):   (1 ml/kcal)    Nutrition Diagnosis:   · Unintended weight loss related to psychological cause or life stress as evidenced by  (reported barrier to PO, reported wt loss)    Nutrition Interventions:   Food and/or Nutrient Delivery: Continue Current Diet,Start Oral Nutrition Supplement     Coordination of Nutrition Care: Continue to monitor while inpatient       Goals: Active Goal: Meet at least 75% of estimated needs,by next RD assessment       Nutrition Monitoring and Evaluation:      Food/Nutrient Intake Outcomes: Food and Nutrient Intake,Supplement Intake       Discharge Planning:     Too soon to determine    DONNA HEATH, ADARSH

## 2022-07-02 NOTE — PROGRESS NOTES
[facility] Pharmacokinetic Monitoring Service - Vancomycin     Jake Adkins is a 62 y.o. female starting on vancomycin therapy for Cellulitis. Pharmacy consulted by Dr Nixon Grant for monitoring and adjustment. Target Concentration: Goal trough of 10-15 mg/L and AUC/DANA <500 mg*hr/L    Additional Antimicrobials: Cefepime    Pertinent Laboratory Values: Wt Readings from Last 1 Encounters:   07/01/22 125 lb (56.7 kg)     Temp Readings from Last 1 Encounters:   07/01/22 98.1 °F (36.7 °C) (Oral)     Recent Labs     07/01/22  1756   BUN 16   CREATININE 0.50*   WBC 3.3*   PROCAL 0.24   LACACIDPL 0.6     Estimated Creatinine Clearance: 107 mL/min (A) (based on SCr of 0.5 mg/dL (L)). No results found for: Storm Khan    MRSA Nasal Swab: N/A. Non-respiratory infection. .    Plan:  1. Dosing recommendations based on Bayesian software  2. Start vancomycin 1000 mg every 12 hours  a. Anticipated AUC of 485 and trough concentration of 13.8 at steady state  3. Renal labs as indicated   4. Vancomycin concentrations will be ordered as clinically appropriate   5.  Pharmacy will continue to monitor patient and adjust therapy as indicated    Thank you for the consult,  Thu Hubbard, PharmD, BCPS  Clinical Pharmacist

## 2022-07-02 NOTE — PROGRESS NOTES
VANCO DAILY FOLLOW UP NOTE  [facility] Pharmacokinetic Monitoring Service - Vancomycin    Consulting Provider: Dr. Vahe Posey   Indication: SSTI  Target Concentration: Goal AUC/DANA 400-600 mg*hr/L  Day of Therapy: 2  Additional Antimicrobials: Cefepime    Pertinent Laboratory Values: Wt Readings from Last 1 Encounters:   07/01/22 125 lb (56.7 kg)     Temp Readings from Last 1 Encounters:   07/02/22 98.5 °F (36.9 °C) (Oral)     Recent Labs     07/01/22  1756 07/02/22  0405   BUN 16 14   CREATININE 0.50* 0.60   WBC 3.3*  --    PROCAL 0.24  --    LACACIDPL 0.6  --      Estimated Creatinine Clearance: 89 mL/min (based on SCr of 0.6 mg/dL). No results found for: [facility]    MRSA Nasal Swab: N/A. Non-respiratory infection. .      Assessment:  Date/Time Dose Concentration AUC         Note: Serum concentrations collected for AUC dosing may appear elevated if collected in close proximity to the dose administered, this is not necessarily an indication of toxicity    Plan:  Continue current dose  Repeat vancomycin concentration ordered for 7/3 @ 0400    Pharmacy will continue to monitor patient and adjust therapy as indicated    Thank you for the consult,  Elnor Schwab, [facility]

## 2022-07-03 ENCOUNTER — ANESTHESIA EVENT (OUTPATIENT)
Dept: SURGERY | Age: 62
DRG: 584 | End: 2022-07-03

## 2022-07-03 ENCOUNTER — ANESTHESIA (OUTPATIENT)
Dept: SURGERY | Age: 62
DRG: 584 | End: 2022-07-03

## 2022-07-03 VITALS
TEMPERATURE: 97.5 F | RESPIRATION RATE: 16 BRPM | OXYGEN SATURATION: 94 % | BODY MASS INDEX: 22.34 KG/M2 | SYSTOLIC BLOOD PRESSURE: 115 MMHG | WEIGHT: 139 LBS | DIASTOLIC BLOOD PRESSURE: 72 MMHG | HEIGHT: 66 IN | HEART RATE: 53 BPM

## 2022-07-03 LAB — VANCOMYCIN SERPL-MCNC: 13.5 UG/ML

## 2022-07-03 PROCEDURE — 87075 CULTR BACTERIA EXCEPT BLOOD: CPT

## 2022-07-03 PROCEDURE — 6370000000 HC RX 637 (ALT 250 FOR IP): Performed by: HOSPITALIST

## 2022-07-03 PROCEDURE — 7100000000 HC PACU RECOVERY - FIRST 15 MIN: Performed by: SURGERY

## 2022-07-03 PROCEDURE — 3700000001 HC ADD 15 MINUTES (ANESTHESIA): Performed by: SURGERY

## 2022-07-03 PROCEDURE — 2580000003 HC RX 258: Performed by: ANESTHESIOLOGY

## 2022-07-03 PROCEDURE — 80202 ASSAY OF VANCOMYCIN: CPT

## 2022-07-03 PROCEDURE — 7100000001 HC PACU RECOVERY - ADDTL 15 MIN: Performed by: SURGERY

## 2022-07-03 PROCEDURE — 36415 COLL VENOUS BLD VENIPUNCTURE: CPT

## 2022-07-03 PROCEDURE — 6360000002 HC RX W HCPCS: Performed by: HOSPITALIST

## 2022-07-03 PROCEDURE — 6360000002 HC RX W HCPCS: Performed by: NURSE ANESTHETIST, CERTIFIED REGISTERED

## 2022-07-03 PROCEDURE — 0H9U0ZZ DRAINAGE OF LEFT BREAST, OPEN APPROACH: ICD-10-PCS | Performed by: SURGERY

## 2022-07-03 PROCEDURE — 87186 SC STD MICRODIL/AGAR DIL: CPT

## 2022-07-03 PROCEDURE — 87077 CULTURE AEROBIC IDENTIFY: CPT

## 2022-07-03 PROCEDURE — 3600000012 HC SURGERY LEVEL 2 ADDTL 15MIN: Performed by: SURGERY

## 2022-07-03 PROCEDURE — 2500000003 HC RX 250 WO HCPCS: Performed by: NURSE ANESTHETIST, CERTIFIED REGISTERED

## 2022-07-03 PROCEDURE — 2580000003 HC RX 258: Performed by: HOSPITALIST

## 2022-07-03 PROCEDURE — 3700000000 HC ANESTHESIA ATTENDED CARE: Performed by: SURGERY

## 2022-07-03 PROCEDURE — 3600000002 HC SURGERY LEVEL 2 BASE: Performed by: SURGERY

## 2022-07-03 PROCEDURE — 87205 SMEAR GRAM STAIN: CPT

## 2022-07-03 RX ORDER — SODIUM CHLORIDE, SODIUM LACTATE, POTASSIUM CHLORIDE, CALCIUM CHLORIDE 600; 310; 30; 20 MG/100ML; MG/100ML; MG/100ML; MG/100ML
INJECTION, SOLUTION INTRAVENOUS CONTINUOUS
Status: DISCONTINUED | OUTPATIENT
Start: 2022-07-03 | End: 2022-07-03 | Stop reason: HOSPADM

## 2022-07-03 RX ORDER — SULFAMETHOXAZOLE AND TRIMETHOPRIM 800; 160 MG/1; MG/1
1 TABLET ORAL 2 TIMES DAILY
Qty: 20 TABLET | Refills: 0 | Status: SHIPPED | OUTPATIENT
Start: 2022-07-03 | End: 2022-07-03 | Stop reason: SDUPTHER

## 2022-07-03 RX ORDER — ONDANSETRON 2 MG/ML
4 INJECTION INTRAMUSCULAR; INTRAVENOUS
Status: DISCONTINUED | OUTPATIENT
Start: 2022-07-03 | End: 2022-07-03 | Stop reason: HOSPADM

## 2022-07-03 RX ORDER — ACETAMINOPHEN 500 MG
1000 TABLET ORAL ONCE
Status: DISCONTINUED | OUTPATIENT
Start: 2022-07-03 | End: 2022-07-03 | Stop reason: HOSPADM

## 2022-07-03 RX ORDER — DIPHENHYDRAMINE HYDROCHLORIDE 50 MG/ML
12.5 INJECTION INTRAMUSCULAR; INTRAVENOUS
Status: DISCONTINUED | OUTPATIENT
Start: 2022-07-03 | End: 2022-07-03 | Stop reason: HOSPADM

## 2022-07-03 RX ORDER — OXYCODONE HYDROCHLORIDE 5 MG/1
5 TABLET ORAL PRN
Status: DISCONTINUED | OUTPATIENT
Start: 2022-07-03 | End: 2022-07-03 | Stop reason: HOSPADM

## 2022-07-03 RX ORDER — SODIUM CHLORIDE 0.9 % (FLUSH) 0.9 %
5-40 SYRINGE (ML) INJECTION EVERY 12 HOURS SCHEDULED
Status: DISCONTINUED | OUTPATIENT
Start: 2022-07-03 | End: 2022-07-03 | Stop reason: HOSPADM

## 2022-07-03 RX ORDER — MIDAZOLAM HYDROCHLORIDE 1 MG/ML
INJECTION INTRAMUSCULAR; INTRAVENOUS PRN
Status: DISCONTINUED | OUTPATIENT
Start: 2022-07-03 | End: 2022-07-03 | Stop reason: SDUPTHER

## 2022-07-03 RX ORDER — ONDANSETRON 2 MG/ML
INJECTION INTRAMUSCULAR; INTRAVENOUS PRN
Status: DISCONTINUED | OUTPATIENT
Start: 2022-07-03 | End: 2022-07-03 | Stop reason: SDUPTHER

## 2022-07-03 RX ORDER — PROPOFOL 10 MG/ML
INJECTION, EMULSION INTRAVENOUS PRN
Status: DISCONTINUED | OUTPATIENT
Start: 2022-07-03 | End: 2022-07-03 | Stop reason: SDUPTHER

## 2022-07-03 RX ORDER — FENTANYL CITRATE 50 UG/ML
100 INJECTION, SOLUTION INTRAMUSCULAR; INTRAVENOUS
Status: DISCONTINUED | OUTPATIENT
Start: 2022-07-03 | End: 2022-07-03 | Stop reason: HOSPADM

## 2022-07-03 RX ORDER — SULFAMETHOXAZOLE AND TRIMETHOPRIM 800; 160 MG/1; MG/1
1 TABLET ORAL 2 TIMES DAILY
Qty: 20 TABLET | Refills: 0 | Status: SHIPPED | OUTPATIENT
Start: 2022-07-03 | End: 2022-07-13

## 2022-07-03 RX ORDER — HYDROMORPHONE HYDROCHLORIDE 2 MG/ML
0.5 INJECTION, SOLUTION INTRAMUSCULAR; INTRAVENOUS; SUBCUTANEOUS EVERY 5 MIN PRN
Status: DISCONTINUED | OUTPATIENT
Start: 2022-07-03 | End: 2022-07-03 | Stop reason: HOSPADM

## 2022-07-03 RX ORDER — MIDAZOLAM HYDROCHLORIDE 2 MG/2ML
2 INJECTION, SOLUTION INTRAMUSCULAR; INTRAVENOUS
Status: DISCONTINUED | OUTPATIENT
Start: 2022-07-03 | End: 2022-07-03 | Stop reason: HOSPADM

## 2022-07-03 RX ORDER — SODIUM CHLORIDE 0.9 % (FLUSH) 0.9 %
5-40 SYRINGE (ML) INJECTION PRN
Status: DISCONTINUED | OUTPATIENT
Start: 2022-07-03 | End: 2022-07-03 | Stop reason: HOSPADM

## 2022-07-03 RX ORDER — LIDOCAINE HYDROCHLORIDE 20 MG/ML
INJECTION, SOLUTION EPIDURAL; INFILTRATION; INTRACAUDAL; PERINEURAL PRN
Status: DISCONTINUED | OUTPATIENT
Start: 2022-07-03 | End: 2022-07-03 | Stop reason: SDUPTHER

## 2022-07-03 RX ORDER — KETOROLAC TROMETHAMINE 30 MG/ML
INJECTION, SOLUTION INTRAMUSCULAR; INTRAVENOUS PRN
Status: DISCONTINUED | OUTPATIENT
Start: 2022-07-03 | End: 2022-07-03 | Stop reason: SDUPTHER

## 2022-07-03 RX ORDER — OXYCODONE HYDROCHLORIDE 5 MG/1
10 TABLET ORAL PRN
Status: DISCONTINUED | OUTPATIENT
Start: 2022-07-03 | End: 2022-07-03 | Stop reason: HOSPADM

## 2022-07-03 RX ORDER — PHENYLEPHRINE HYDROCHLORIDE 10 MG/ML
INJECTION INTRAVENOUS PRN
Status: DISCONTINUED | OUTPATIENT
Start: 2022-07-03 | End: 2022-07-03 | Stop reason: SDUPTHER

## 2022-07-03 RX ORDER — DEXAMETHASONE SODIUM PHOSPHATE 4 MG/ML
INJECTION, SOLUTION INTRA-ARTICULAR; INTRALESIONAL; INTRAMUSCULAR; INTRAVENOUS; SOFT TISSUE PRN
Status: DISCONTINUED | OUTPATIENT
Start: 2022-07-03 | End: 2022-07-03 | Stop reason: SDUPTHER

## 2022-07-03 RX ORDER — FENTANYL CITRATE 50 UG/ML
INJECTION, SOLUTION INTRAMUSCULAR; INTRAVENOUS PRN
Status: DISCONTINUED | OUTPATIENT
Start: 2022-07-03 | End: 2022-07-03 | Stop reason: SDUPTHER

## 2022-07-03 RX ADMIN — ONDANSETRON 4 MG: 2 INJECTION INTRAMUSCULAR; INTRAVENOUS at 10:48

## 2022-07-03 RX ADMIN — Medication 10 ML: at 10:39

## 2022-07-03 RX ADMIN — FENTANYL CITRATE 50 MCG: 50 INJECTION, SOLUTION INTRAMUSCULAR; INTRAVENOUS at 10:49

## 2022-07-03 RX ADMIN — KETOROLAC TROMETHAMINE 30 MG: 30 INJECTION, SOLUTION INTRAMUSCULAR; INTRAVENOUS at 11:01

## 2022-07-03 RX ADMIN — ONDANSETRON 4 MG: 4 TABLET, ORALLY DISINTEGRATING ORAL at 14:27

## 2022-07-03 RX ADMIN — MIDAZOLAM 2 MG: 1 INJECTION INTRAMUSCULAR; INTRAVENOUS at 10:34

## 2022-07-03 RX ADMIN — FENTANYL CITRATE 50 MCG: 50 INJECTION, SOLUTION INTRAMUSCULAR; INTRAVENOUS at 10:40

## 2022-07-03 RX ADMIN — LIDOCAINE HYDROCHLORIDE 60 MG: 20 INJECTION, SOLUTION EPIDURAL; INFILTRATION; INTRACAUDAL; PERINEURAL at 10:40

## 2022-07-03 RX ADMIN — ACETAMINOPHEN 650 MG: 325 TABLET ORAL at 12:10

## 2022-07-03 RX ADMIN — PHENYLEPHRINE HYDROCHLORIDE 100 MCG: 10 INJECTION INTRAVENOUS at 11:04

## 2022-07-03 RX ADMIN — PHENYLEPHRINE HYDROCHLORIDE 100 MCG: 10 INJECTION INTRAVENOUS at 11:07

## 2022-07-03 RX ADMIN — PROPOFOL 200 MG: 10 INJECTION, EMULSION INTRAVENOUS at 10:40

## 2022-07-03 RX ADMIN — SODIUM CHLORIDE, PRESERVATIVE FREE 10 ML: 5 INJECTION INTRAVENOUS at 08:44

## 2022-07-03 RX ADMIN — SODIUM CHLORIDE, SODIUM LACTATE, POTASSIUM CHLORIDE, AND CALCIUM CHLORIDE: 600; 310; 30; 20 INJECTION, SOLUTION INTRAVENOUS at 10:31

## 2022-07-03 RX ADMIN — DEXAMETHASONE SODIUM PHOSPHATE 4 MG: 4 INJECTION, SOLUTION INTRAMUSCULAR; INTRAVENOUS at 10:48

## 2022-07-03 RX ADMIN — VANCOMYCIN HYDROCHLORIDE 1000 MG: 1 INJECTION, POWDER, LYOPHILIZED, FOR SOLUTION INTRAVENOUS at 10:30

## 2022-07-03 RX ADMIN — SODIUM CHLORIDE, SODIUM LACTATE, POTASSIUM CHLORIDE, AND CALCIUM CHLORIDE: 600; 310; 30; 20 INJECTION, SOLUTION INTRAVENOUS at 10:34

## 2022-07-03 ASSESSMENT — PAIN DESCRIPTION - LOCATION
LOCATION: ARM;BREAST
LOCATION: BREAST

## 2022-07-03 ASSESSMENT — PAIN SCALES - GENERAL
PAINLEVEL_OUTOF10: 2
PAINLEVEL_OUTOF10: 1
PAINLEVEL_OUTOF10: 1
PAINLEVEL_OUTOF10: 0
PAINLEVEL_OUTOF10: 2
PAINLEVEL_OUTOF10: 0
PAINLEVEL_OUTOF10: 0
PAINLEVEL_OUTOF10: 2
PAINLEVEL_OUTOF10: 0
PAINLEVEL_OUTOF10: 2
PAINLEVEL_OUTOF10: 2

## 2022-07-03 ASSESSMENT — PAIN DESCRIPTION - PAIN TYPE
TYPE: SURGICAL PAIN

## 2022-07-03 ASSESSMENT — PAIN DESCRIPTION - ORIENTATION
ORIENTATION: LEFT

## 2022-07-03 ASSESSMENT — PAIN DESCRIPTION - DESCRIPTORS: DESCRIPTORS: ACHING;DISCOMFORT

## 2022-07-03 NOTE — ANESTHESIA PRE PROCEDURE
Department of Anesthesiology  Preprocedure Note       Name:  Wayne Eddy   Age:  62 y.o.  :  1960                                          MRN:  [id number]         Date:  7/3/2022      Surgeon: Denise Elizalde):  Kamar Stoner MD    Procedure: Procedure(s):  BREAST INCISION AND DRAINAGE LEFT    Medications prior to admission:   Prior to Admission medications    Medication Sig Start Date End Date Taking?  Authorizing Provider   doxycycline hyclate (VIBRA-TABS) 100 MG tablet Take 1 tablet by mouth 2 times daily for 7 days 22  PRAVEEN Fischer       Current medications:    Current Facility-Administered Medications   Medication Dose Route Frequency Provider Last Rate Last Admin    HYDROcodone-acetaminophen (NORCO) 7.5-325 MG per tablet 1 tablet  1 tablet Oral Q4H PRN Radha Torres MD        morphine injection 2 mg  2 mg IntraVENous Q4H PRN Radha Torres MD        sodium chloride flush 0.9 % injection 5-40 mL  5-40 mL IntraVENous 2 times per day Chante Bray MD   10 mL at 22 0844    sodium chloride flush 0.9 % injection 5-40 mL  5-40 mL IntraVENous PRN Avery Kang MD        0.9 % sodium chloride infusion   IntraVENous PRN Chante Bray MD        enoxaparin (LOVENOX) injection 40 mg  40 mg SubCUTAneous Daily Avery Kang MD   40 mg at 22 1028    ondansetron (ZOFRAN-ODT) disintegrating tablet 4 mg  4 mg Oral Q8H PRN Avery Kang MD        Or    ondansetron (ZOFRAN) injection 4 mg  4 mg IntraVENous Q6H PRN Avery Kang MD        polyethylene glycol (GLYCOLAX) packet 17 g  17 g Oral Daily PRN Chante Bray MD        acetaminophen (TYLENOL) tablet 650 mg  650 mg Oral Q6H PRN Avery Kang MD   650 mg at 22 5433    Or    acetaminophen (TYLENOL) suppository 650 mg  650 mg Rectal Q6H PRN Chante Bray MD        vancomycin (VANCOCIN) 1,000 mg in sodium chloride 0.9 % 250 mL IVPB ([id number])  1,000 mg IntraVENous Q12H Chante Bray MD   Stopped at 22 2915 Facility-Administered Medications Ordered in Other Encounters   Medication Dose Route Frequency Provider Last Rate Last Admin    0.9 % sodium chloride bolus  100 mL IntraVENous ONCE PRN PRAVEEN Marshall        sodium chloride flush 0.9 % injection 10 mL  10 mL IntraVENous ONCE PRN PRAVEEN Marshall           Allergies:  No Known Allergies    Problem List:    Patient Active Problem List   Diagnosis Code    Cellulitis of breast N61.0       Past Medical History:  History reviewed. No pertinent past medical history. Past Surgical History:        Procedure Laterality Date    VARICOSE VEIN SURGERY Left 2009       Social History:    Social History     Tobacco Use    Smoking status: Never Smoker    Smokeless tobacco: Never Used   Substance Use Topics    Alcohol use: Yes     Alcohol/week: 1.0 standard drink     Types: 1 Shots of liquor per week     Comment: Every 3 weeks                                Counseling given: Not Answered      Vital Signs (Current):   Vitals:    07/02/22 2025 07/03/22 0017 07/03/22 0532 07/03/22 0746   BP: 101/64 96/63 116/69 114/74   Pulse: 62 62 61 60   Resp: 18 18 18 18   Temp: 98.8 °F (37.1 °C) 98.3 °F (36.8 °C) 98.4 °F (36.9 °C) 98.4 °F (36.9 °C)   TempSrc: Oral Oral Oral Oral   SpO2: 95% 96% 97% 98%   Weight:   139 lb (63 kg)    Height:                                                  BP Readings from Last 3 Encounters:   07/03/22 114/74   07/01/22 103/65   06/29/22 98/70       NPO Status:                                                                                 BMI:   Wt Readings from Last 3 Encounters:   07/03/22 139 lb (63 kg)   07/01/22 125 lb (56.7 kg)   06/29/22 125 lb (56.7 kg)     Body mass index is 22.44 kg/m².     CBC:   Lab Results   Component Value Date/Time    WBC 3.3 07/01/2022 05:56 PM    RBC 3.78 07/01/2022 05:56 PM    HGB 10.8 07/01/2022 05:56 PM    HCT 32.7 07/01/2022 05:56 PM    MCV 86.5 07/01/2022 05:56 PM    RDW 14.6 07/01/2022 05:56 PM     07/01/2022 05:56 PM       CMP:   Lab Results   Component Value Date/Time     07/02/2022 04:05 AM    K 3.7 07/02/2022 04:05 AM     07/02/2022 04:05 AM    CO2 27 07/02/2022 04:05 AM    BUN 14 07/02/2022 04:05 AM    CREATININE 0.60 07/02/2022 04:05 AM    GFRAA >60 07/02/2022 04:05 AM    LABGLOM >60 07/02/2022 04:05 AM    GLUCOSE 82 07/02/2022 04:05 AM    PROT 6.2 07/02/2022 04:05 AM    CALCIUM 8.0 07/02/2022 04:05 AM    BILITOT 0.4 07/02/2022 04:05 AM    ALKPHOS 67 07/02/2022 04:05 AM    AST 43 07/02/2022 04:05 AM    ALT 31 07/02/2022 04:05 AM       POC Tests: No results for input(s): POCGLU, POCNA, POCK, POCCL, POCBUN, POCHEMO, POCHCT in the last 72 hours. Coags: No results found for: PROTIME, INR, APTT    HCG (If Applicable): No results found for: PREGTESTUR, PREGSERUM, HCG, HCGQUANT     ABGs: No results found for: PHART, PO2ART, JOG1PHK, NCN0FDE, BEART, Q1UDKVTF     Type & Screen (If Applicable):  No results found for: LABABO, LABRH    Drug/Infectious Status (If Applicable):  No results found for: HIV, HEPCAB    COVID-19 Screening (If Applicable): No results found for: COVID19        Anesthesia Evaluation  Patient summary reviewed and Nursing notes reviewed  Airway: Mallampati: II  TM distance: >3 FB   Neck ROM: full  Mouth opening: > = 3 FB   Dental: normal exam         Pulmonary:Negative Pulmonary ROS and normal exam  breath sounds clear to auscultation                             Cardiovascular:Negative CV ROS  Exercise tolerance: good (>4 METS),           Rhythm: regular  Rate: normal                    Neuro/Psych:   Negative Neuro/Psych ROS              GI/Hepatic/Renal: Neg GI/Hepatic/Renal ROS            Endo/Other: Negative Endo/Other ROS                    Abdominal:             Vascular: negative vascular ROS. Other Findings:           Anesthesia Plan      general     ASA 1 - emergent       Induction: intravenous.       Anesthetic plan and risks discussed with patient.                         Patrick Jack MD   7/3/2022

## 2022-07-03 NOTE — PROGRESS NOTES
Bedside and Verbal shift change report given to myself (oncoming nurse) by Carlene Hancock RN (offgoing nurse). Report included the following information Nurse Handoff Report, Index, Intake/Output, MAR, Recent Results and Med Rec Status.

## 2022-07-03 NOTE — ANESTHESIA PROCEDURE NOTES
Airway  Urgency: elective      General Information and Staff    Patient location during procedure: OR  Anesthesiologist: Aamir Butcher MD  Resident/CRNA: FÉLIX Whitmore - CRNA  Performed: resident/CRNA     Indications and Patient Condition  Indications for airway management: anesthesia  Spontaneous ventilation: present  Sedation level: deep  Preoxygenated: yes  Patient position: sniffing  Mask difficulty assessment: vent by bag mask    Final Airway Details  Final airway type: supraglottic airway      Successful airway: Size 4    Number of attempts at approach: 1  Ventilation between attempts: spontaneous

## 2022-07-03 NOTE — ANESTHESIA POSTPROCEDURE EVALUATION
Department of Anesthesiology  Postprocedure Note    Patient: Radha Peña  MRN: [id number]  YOB: 1960  Date of evaluation: 7/3/2022      Procedure Summary     Date: 07/03/22 Room / Location: [facility] MAIN OR  / [facility] MAIN OR    Anesthesia Start: 1034 Anesthesia Stop: 1110    Procedure: BREAST INCISION AND DRAINAGE LEFT (Left Axilla) Diagnosis:       Breast abscess      (Left breast abscess)    Providers: Aida Stuart MD Responsible Provider:     Anesthesia Type: general ASA Status: 1 - Emergent          Anesthesia Type: No value filed.     Bharat Phase I: Bharat Score: 9    Bharat Phase II:        Anesthesia Post Evaluation    Patient location during evaluation: PACU  Patient participation: complete - patient participated  Level of consciousness: awake and alert  Airway patency: patent  Nausea & Vomiting: no nausea and no vomiting  Complications: no  Cardiovascular status: hemodynamically stable  Respiratory status: acceptable, nonlabored ventilation and spontaneous ventilation  Hydration status: euvolemic  Comments: /68   Pulse 50   Temp 97.9 °F (36.6 °C) (Oral)   Resp 16   Ht 5' 6\" (1.676 m)   Wt 139 lb (63 kg)   SpO2 100%   BMI 22.44 kg/m²     Multimodal analgesia pain management approach

## 2022-07-03 NOTE — PROGRESS NOTES
1. Ok to resume diet  2. Will need daily dressing changes to Left breast - pack with iodoform and cover with gauze. 3. Can transition to oral abx (recommend Bactrim) and discharge at any time from surgical perspective. 4. Follow up in office with Dr. Ellyn Khanna in 10-14 days. Call office on Tuesday to schedule appt time.        Dorlene Hatchet, NP

## 2022-07-03 NOTE — CARE COORDINATION
Pt presented to ED with bilateral breast redness, swelling left more than right. Pt initially went to Urgent Care where she underwent an incision and drainage. Was prescribed an abx but did not get the script filled until the next day. After no improvement from the abx, she presented to Urgent Care again but was referred to the ED for further evaluation. CT scan of chest was obtained, showed evidence of cellulitis. Discharge order is in. Pt is discharging home in stable condition. PTA, pt reports she is indep with all her ADLs. She lives in her own one-level home and rents out the rooms in her home to supplement her income. She reported that she does not have family nearby. She relies on bus transportation or [employer]. CM will arrange an [employer] to transport her home today. Pt denies DME use. Is on RA. Denies h/o HH and STR. Pt denies having a PCP and is uninsured. Pt reported being able to afford her prescription, Bactrim 800/160mg #20-$11.83. CM provided pt with NH and the [facility] information, a [employer] application for future prescriptions and a Good Rx card. No other discharge needs were identified. Tx goals have been met.     [employer] arranged for 1745 p/u.       07/03/22 1605   Service Assessment   Patient Orientation Alert and Oriented   Cognition Alert   History Provided By Patient   Primary Caregiver Self   Support Systems None   PCP Verified by CM No  (Provided pt resources on [facility] and NH)   Prior Functional Level Independent in ADLs/IADLs   Current Functional Level Independent in ADLs/IADLs   Can patient return to prior living arrangement Yes   Ability to make needs known: Good   Family able to assist with home care needs: Yes   Financial Resources Financial Counseling   CM/SW Referral No PCP   Social/Functional History   Lives With Alone   Type of [address] One level   ADL Assistance Independent   Active  No   Mode of Transportation Bus   Discharge Planning   Living Arrangements Alone Current Services Prior To Admission None   Potential Assistance Needed N/A   DME Ordered? No   Potential Assistance Purchasing Medications No   Type of Home Care Services None   Patient expects to be discharged to: [facility] Discharge   Services At/After Discharge None   [facility] Information Provided? No   Mode of Transport at Discharge Other (see comment)  Colletta Inch)   Confirm Follow Up Transport Self   Condition of Participation: Discharge Planning   The Patient and/or Patient Representative was provided with a Choice of Provider? Patient   The Patient and/Or Patient Representative agree with the Discharge Plan? Yes   Freedom of Choice list was provided with basic dialogue that supports the patient's individualized plan of care/goals, treatment preferences, and shares the quality data associated with the providers?   Yes

## 2022-07-03 NOTE — PERIOP NOTE
TRANSFER - IN REPORT:    Verbal report received from Ilana Ugalde RN on Len Durant  being received from [phone] for ordered procedure      Report consisted of patient's Situation, Background, Assessment and   Recommendations(SBAR). Information from the following report(s) Nurse Handoff Report, MAR, Recent Results, Pre Procedure Checklist and Procedure Verification was reviewed with the receiving nurse. Opportunity for questions and clarification was provided. Assessment completed upon patient's arrival to unit and care assumed.

## 2022-07-03 NOTE — DISCHARGE SUMMARY
[unfilled]    Physician Discharge Summary     Patient ID:  Vic Delgado  [id number]  28 y.o.  1960    Admit date: 7/1/2022    Discharge date: 7-3-2022    Diagnosis:  Cellulitis of breast : CT did not show clear abscess but incision and drainage site still draining purulent discharge. Has labial lesion w/ pustular discharge in last few weeks which is better. No other hx of skin infections. Incision and drainage of left axillary abscess performed on 7-3-22. Treated with Vancomycin IV. Blood cultures negative on discharge. Will follow wound culture. IMPROVED. Hospital course:   62years old female with no major past medical problems presented to emergency room with bilateral breast redness, swelling left more than right started around 4 days ago, patient went to urgent care where patient underwent incision and drainage, pus was drained, recommended to take antibiotics but patient did not get antibiotics until next day, started taking doxycycline but no improvement so presented to urgent care today, patient was referred to ER for further evaluation. Patient reports \"sleepy\" all the time, reports low-grade fever, generalized weakness. Reports secretions from left axillary incision draining all day. Evaluated in emergency room, CT scan of chest was obtained consistent shows evidence of cellulitis. Patient admitted and treated as above. On day of discharge, patient exam showed significant improvement of redness. Pain controlled with tylenol.     PCP: None Provider    Patient Instructions:   Current Discharge Medication List      START taking these medications    Details   sulfamethoxazole-trimethoprim (BACTRIM DS;SEPTRA DS) 800-160 MG per tablet Take 1 tablet by mouth 2 times daily for 10 days  Qty: 20 tablet, Refills: 0         STOP taking these medications       doxycycline hyclate (VIBRA-TABS) 100 MG tablet Comments:   Reason for Stopping:               Instructions:     Surgery to follow as set up, please show patient dressing change, diet and activity as tolerated. Condition: Stable  Follow-up with primary physician in 1-2 week. Time 20 min    Please send copy to primary physician.     Signed:  Kevin Jaimes MD  7/3/2022  3:39 PM

## 2022-07-03 NOTE — OP NOTE
Operative Note      Patient: Lili Inman  YOB: 1960  MRN: [id number]    Date of Procedure: 7/3/2022    Pre-Op Diagnosis: Left breast abscess    Post-Op Diagnosis: same   Procedure(s):  BREAST INCISION AND DRAINAGE LEFT    Surgeon(s):  Evan Thorne MD    Assistant:   * No surgical staff found *    Anesthesia: General    Estimated Blood Loss (mL): minimal     Complications:none  Specimens:   ID Type Source Tests Collected by Time Destination   1 : Left axilla abscess Swab Axillary CULTURE, WOUND, CULTURE, ANAEROBIC AND AEROBIC Evan Thorne MD 7/3/2022 1056        Implants:  none    Drains: no    Findings: left upper outer quadrant of left breast abscess   Detailed Description of Procedure:     Patient taken tot he OR, sedated and LMA was placed  Area was widely prepped and draped. Incisions done at the level of the fluctuance and large amount of purulent fluid was drained.  The cavity was irrigated with sterile saline and packed with 1/2 inch iodoform      Electronically signed by Eloy Peralta MD on 7/3/2022 at 11:29 AM

## 2022-07-03 NOTE — PERIOP NOTE
TRANSFER - OUT REPORT:    Verbal report given to Mook Martinez RN on Yamile Rio being transferred to room 301 for routine post-op. Report consisted of patients Situation, Background, Assessment and Recommendations (SBAR). Information from the following report(s) Nurse Handoff Report, Surgery Report, Intake/Output, MAR and Recent Results was reviewed with the receiving nurse. Lines:   Peripheral IV 07/01/22 Left Antecubital (Active)   Site Assessment Clean, dry & intact 07/03/22 1135   Line Status Infusing 07/03/22 1135   Line Care Cap changed 07/03/22 0840   Phlebitis Assessment No symptoms 07/03/22 1135   Infiltration Assessment 0 07/03/22 1135   Alcohol Cap Used Yes 07/03/22 1135   Dressing Status Clean, dry & intact 07/03/22 1135   Dressing Type Transparent 07/03/22 1135        Opportunity for questions and clarification was provided. Patient transported with:   O2 @ 2 liters    VTE prophylaxis orders have been written for Yamile Rio.    Patient given floor number and nurse's name.

## 2022-07-03 NOTE — PROGRESS NOTES
Discharge instructions reviewed with patient. Med info sheets provided for all new medications. Opportunity for questions provided. Patient voiced understanding of all discharge instructions. IV taken out. Pt has no questions at time of discharge.

## 2022-07-03 NOTE — PROGRESS NOTES
VANCO DAILY FOLLOW UP NOTE  [facility] Pharmacokinetic Monitoring Service - Vancomycin    Consulting Provider: Dr. Deepa Carrillo   Indication: SSTI  Target Concentration: Goal AUC/DANA 400-600 mg*hr/L  Day of Therapy: 3  Additional Antimicrobials: Cefepime    Pertinent Laboratory Values: Wt Readings from Last 1 Encounters:   07/03/22 139 lb (63 kg)     Temp Readings from Last 1 Encounters:   07/03/22 98.4 °F (36.9 °C) (Oral)     Recent Labs     07/01/22  1756 07/02/22  0405   BUN 16 14   CREATININE 0.50* 0.60   WBC 3.3*  --    PROCAL 0.24  --    LACACIDPL 0.6  --      Estimated Creatinine Clearance: 93 mL/min (based on SCr of 0.6 mg/dL). Lab Results   Component Value Date/Time    VANCORANDOM 13.5 07/03/2022 04:40 AM       MRSA Nasal Swab: N/A. Non-respiratory infection. .      Assessment:  Date/Time Dose Concentration AUC   7/3 0440 1000 mg q12h 13.5 495   Note: Serum concentrations collected for AUC dosing may appear elevated if collected in close proximity to the dose administered, this is not necessarily an indication of toxicity    Plan:  Current dosing regimen is therapeutic  Continue current dose  Repeat vancomycin concentrations will be ordered as clinically appropriate   Pharmacy will continue to monitor patient and adjust therapy as indicated    Thank you for the consult,  Edwin Bradford, [address] [facility]

## 2022-07-05 LAB
BACTERIA SPEC CULT: ABNORMAL
BACTERIA SPEC CULT: ABNORMAL
GRAM STN SPEC: ABNORMAL
GRAM STN SPEC: ABNORMAL
SERVICE CMNT-IMP: ABNORMAL

## 2022-07-06 LAB
BACTERIA SPEC CULT: NORMAL
BACTERIA SPEC CULT: NORMAL
SERVICE CMNT-IMP: NORMAL
SERVICE CMNT-IMP: NORMAL

## 2022-07-11 ENCOUNTER — OFFICE VISIT (OUTPATIENT)
Dept: INTERNAL MEDICINE CLINIC | Facility: CLINIC | Age: 62
End: 2022-07-11

## 2022-07-11 VITALS
HEIGHT: 66 IN | DIASTOLIC BLOOD PRESSURE: 65 MMHG | OXYGEN SATURATION: 96 % | WEIGHT: 133 LBS | TEMPERATURE: 98.4 F | BODY MASS INDEX: 21.38 KG/M2 | HEART RATE: 84 BPM | SYSTOLIC BLOOD PRESSURE: 98 MMHG

## 2022-07-11 DIAGNOSIS — N61.0 CELLULITIS OF BREAST: ICD-10-CM

## 2022-07-11 DIAGNOSIS — Z22.322 MRSA (METHICILLIN RESISTANT STAPH AUREUS) CULTURE POSITIVE: ICD-10-CM

## 2022-07-11 DIAGNOSIS — Z09 HOSPITAL DISCHARGE FOLLOW-UP: Primary | ICD-10-CM

## 2022-07-11 LAB
BACTERIA SPEC CULT: NORMAL
SERVICE CMNT-IMP: NORMAL

## 2022-07-11 PROCEDURE — 99214 OFFICE O/P EST MOD 30 MIN: CPT | Performed by: NURSE PRACTITIONER

## 2022-07-11 PROCEDURE — 1111F DSCHRG MED/CURRENT MED MERGE: CPT | Performed by: NURSE PRACTITIONER

## 2022-07-11 RX ORDER — DOXYCYCLINE HYCLATE 50 MG/1
50 CAPSULE ORAL 2 TIMES DAILY
COMMUNITY
End: 2022-07-15

## 2022-07-11 SDOH — ECONOMIC STABILITY: FOOD INSECURITY: WITHIN THE PAST 12 MONTHS, YOU WORRIED THAT YOUR FOOD WOULD RUN OUT BEFORE YOU GOT MONEY TO BUY MORE.: NEVER TRUE

## 2022-07-11 SDOH — ECONOMIC STABILITY: FOOD INSECURITY: WITHIN THE PAST 12 MONTHS, THE FOOD YOU BOUGHT JUST DIDN'T LAST AND YOU DIDN'T HAVE MONEY TO GET MORE.: NEVER TRUE

## 2022-07-11 ASSESSMENT — ENCOUNTER SYMPTOMS
SINUS PAIN: 0
COUGH: 0
RHINORRHEA: 0
BACK PAIN: 0
VOMITING: 0
SHORTNESS OF BREATH: 0
DIARRHEA: 0
CONSTIPATION: 0
ABDOMINAL PAIN: 0
EYE PAIN: 0
NAUSEA: 0
SORE THROAT: 0

## 2022-07-11 ASSESSMENT — PATIENT HEALTH QUESTIONNAIRE - PHQ9
SUM OF ALL RESPONSES TO PHQ QUESTIONS 1-9: 0
SUM OF ALL RESPONSES TO PHQ9 QUESTIONS 1 & 2: 0
2. FEELING DOWN, DEPRESSED OR HOPELESS: 0
1. LITTLE INTEREST OR PLEASURE IN DOING THINGS: 0
SUM OF ALL RESPONSES TO PHQ QUESTIONS 1-9: 0

## 2022-07-11 ASSESSMENT — ANXIETY QUESTIONNAIRES
GAD7 TOTAL SCORE: 0
1. FEELING NERVOUS, ANXIOUS, OR ON EDGE: 0
6. BECOMING EASILY ANNOYED OR IRRITABLE: 0
3. WORRYING TOO MUCH ABOUT DIFFERENT THINGS: 0
7. FEELING AFRAID AS IF SOMETHING AWFUL MIGHT HAPPEN: 0
4. TROUBLE RELAXING: 0
5. BEING SO RESTLESS THAT IT IS HARD TO SIT STILL: 0
2. NOT BEING ABLE TO STOP OR CONTROL WORRYING: 0

## 2022-07-11 ASSESSMENT — SOCIAL DETERMINANTS OF HEALTH (SDOH): HOW HARD IS IT FOR YOU TO PAY FOR THE VERY BASICS LIKE FOOD, HOUSING, MEDICAL CARE, AND HEATING?: NOT HARD AT ALL

## 2022-07-11 NOTE — PROGRESS NOTES
100 MG tablet Comments:   Reason for Stopping:                          Patient Active Problem List   Diagnosis    Cellulitis of breast       History reviewed. No pertinent past medical history. Past Surgical History:   Procedure Laterality Date    BREAST SURGERY Left 7/3/2022    BREAST INCISION AND DRAINAGE LEFT performed by Chi Castro MD at [address] Left 2009       History reviewed. No pertinent family history. No Known Allergies        Current Outpatient Medications on File Prior to Visit   Medication Sig Dispense Refill    sulfamethoxazole-trimethoprim (BACTRIM DS;SEPTRA DS) 800-160 MG per tablet Take 1 tablet by mouth 2 times daily for 10 days 20 tablet 0    doxycycline (VIBRAMYCIN) 50 MG capsule Take 50 mg by mouth 2 times daily (Patient not taking: Reported on 7/11/2022)       No current facility-administered medications on file prior to visit. Review of Systems  Review of Systems   Constitutional: Negative for chills, fatigue and fever. HENT: Negative for congestion, postnasal drip, rhinorrhea, sinus pain, sneezing and sore throat. Eyes: Negative for pain and visual disturbance. Respiratory: Negative for cough and shortness of breath. Cardiovascular: Negative for chest pain, palpitations and leg swelling. Gastrointestinal: Negative for abdominal pain, constipation, diarrhea, nausea and vomiting. Genitourinary: Negative for dysuria, frequency and urgency. Musculoskeletal: Negative for back pain, gait problem and joint swelling. Skin: Positive for wound (left breast, left axilla). Negative for rash. Neurological: Negative for dizziness and headaches. Psychiatric/Behavioral: Negative for behavioral problems, sleep disturbance and suicidal ideas. The patient is not nervous/anxious.                Vitals:    07/11/22 1519   BP: 98/65   Pulse: 84   Temp: 98.4 °F (36.9 °C)   SpO2: 96%              Physical Exam  Physical Wound Care  -     [facility]ound [facility]       Wound Care  Attempted to unpack. Pt complaint of pain, wound deep, approx at least 3 cm deep (based on outside palpation). Unpacking held and deferred to wound care. Periwound cleansed with NS gauze, covered with gauze and Tegaderm dressing. Advised to keep periwound clean. Advised to monitor and report any worsening symptoms to ER/hospital.      Orders Placed This Encounter   Procedures    [address]     Referral Priority:   Routine     Referral Type:   Eval and Treat     Referral Reason:   Specialty Services Required     Number of Visits Requested:   1    ND DISCHARGE MEDS RECONCILED W/ CURRENT OUTPATIENT MED LIST                Follow Up    Return in about 2 weeks (around 7/25/2022), or as needed, for ROV. Has appt wound clinic availability on Friday, July 15, 2022. Aj Valdez had called and spoke to a Wound Clinic staff.           Mj Vargas, [place], FNP-BC

## 2022-07-13 ENCOUNTER — TELEPHONE (OUTPATIENT)
Dept: INTERNAL MEDICINE CLINIC | Facility: CLINIC | Age: 62
End: 2022-07-13

## 2022-07-13 NOTE — TELEPHONE ENCOUNTER
Patient called, having an allergic reaction to Bactrim that was prescribed while she was in the hospital.  Patient wants to call Dr. Prabha Reyes at the hospital to speak with him. Patient given [facility] to [facility] - she isn't sure which she went to. Wilian Ruiz to call back if she is unable to reach physician, may need to be seen at urgent care. Patient instructed to stop taking the medication until she has spoken to or has been seen by someone.  reaction - RASH

## 2022-07-15 ENCOUNTER — HOSPITAL ENCOUNTER (OUTPATIENT)
Dept: WOUND CARE | Age: 62
Setting detail: RECURRING SERIES
Discharge: HOME | End: 2022-07-15

## 2022-07-15 VITALS
DIASTOLIC BLOOD PRESSURE: 80 MMHG | TEMPERATURE: 97.9 F | BODY MASS INDEX: 21.57 KG/M2 | WEIGHT: 134.2 LBS | SYSTOLIC BLOOD PRESSURE: 110 MMHG | HEART RATE: 78 BPM | HEIGHT: 66 IN | RESPIRATION RATE: 18 BRPM

## 2022-07-15 DIAGNOSIS — S21.002D OPEN WOUND OF LEFT BREAST, SUBSEQUENT ENCOUNTER: ICD-10-CM

## 2022-07-15 DIAGNOSIS — N61.0 CELLULITIS OF LEFT BREAST: Primary | ICD-10-CM

## 2022-07-15 DIAGNOSIS — L03.111 CELLULITIS OF AXILLA, RIGHT: ICD-10-CM

## 2022-07-15 DIAGNOSIS — L03.115 CELLULITIS OF RIGHT FOOT DUE TO METHICILLIN-RESISTANT STAPHYLOCOCCUS AUREUS: ICD-10-CM

## 2022-07-15 DIAGNOSIS — L03.90 MRSA CELLULITIS: ICD-10-CM

## 2022-07-15 DIAGNOSIS — B95.62 CELLULITIS OF RIGHT FOOT DUE TO METHICILLIN-RESISTANT STAPHYLOCOCCUS AUREUS: ICD-10-CM

## 2022-07-15 DIAGNOSIS — S41.102A OPEN WOUND OF LEFT AXILLARY REGION, INITIAL ENCOUNTER: ICD-10-CM

## 2022-07-15 DIAGNOSIS — B95.62 MRSA CELLULITIS: ICD-10-CM

## 2022-07-15 DIAGNOSIS — S21.002A OPEN WOUND OF LEFT BREAST, INITIAL ENCOUNTER: ICD-10-CM

## 2022-07-15 PROCEDURE — 99244 OFF/OP CNSLTJ NEW/EST MOD 40: CPT | Performed by: SURGERY

## 2022-07-15 PROCEDURE — 99213 OFFICE O/P EST LOW 20 MIN: CPT

## 2022-07-15 RX ORDER — CLOBETASOL PROPIONATE 0.5 MG/G
OINTMENT TOPICAL ONCE
Status: CANCELLED | OUTPATIENT
Start: 2022-07-15 | End: 2022-07-15

## 2022-07-15 RX ORDER — LIDOCAINE HYDROCHLORIDE 40 MG/ML
SOLUTION TOPICAL ONCE
Status: CANCELLED | OUTPATIENT
Start: 2022-07-15 | End: 2022-07-15

## 2022-07-15 RX ORDER — BACITRACIN, NEOMYCIN, POLYMYXIN B 400; 3.5; 5 [USP'U]/G; MG/G; [USP'U]/G
OINTMENT TOPICAL ONCE
Status: CANCELLED | OUTPATIENT
Start: 2022-07-15 | End: 2022-07-15

## 2022-07-15 RX ORDER — BETAMETHASONE DIPROPIONATE 0.05 %
OINTMENT (GRAM) TOPICAL ONCE
Status: CANCELLED | OUTPATIENT
Start: 2022-07-15 | End: 2022-07-15

## 2022-07-15 RX ORDER — GINSENG 100 MG
CAPSULE ORAL ONCE
Status: CANCELLED | OUTPATIENT
Start: 2022-07-15 | End: 2022-07-15

## 2022-07-15 RX ORDER — LIDOCAINE 50 MG/G
OINTMENT TOPICAL ONCE
Status: CANCELLED | OUTPATIENT
Start: 2022-07-15 | End: 2022-07-15

## 2022-07-15 RX ORDER — DOXYCYCLINE 100 MG/1
100 TABLET ORAL EVERY 12 HOURS
Qty: 28 TABLET | Refills: 0 | Status: SHIPPED | OUTPATIENT
Start: 2022-07-15 | End: 2022-07-29 | Stop reason: ALTCHOICE

## 2022-07-15 RX ORDER — LIDOCAINE HYDROCHLORIDE 20 MG/ML
JELLY TOPICAL ONCE
Status: CANCELLED | OUTPATIENT
Start: 2022-07-15 | End: 2022-07-15

## 2022-07-15 RX ORDER — LIDOCAINE 40 MG/G
CREAM TOPICAL ONCE
Status: CANCELLED | OUTPATIENT
Start: 2022-07-15 | End: 2022-07-15

## 2022-07-15 RX ORDER — GENTAMICIN SULFATE 1 MG/G
OINTMENT TOPICAL ONCE
Status: CANCELLED | OUTPATIENT
Start: 2022-07-15 | End: 2022-07-15

## 2022-07-15 RX ORDER — BACITRACIN ZINC AND POLYMYXIN B SULFATE 500; 1000 [USP'U]/G; [USP'U]/G
OINTMENT TOPICAL ONCE
Status: CANCELLED | OUTPATIENT
Start: 2022-07-15 | End: 2022-07-15

## 2022-07-15 ASSESSMENT — PAIN DESCRIPTION - ORIENTATION: ORIENTATION: LEFT

## 2022-07-15 ASSESSMENT — PAIN SCALES - GENERAL: PAINLEVEL_OUTOF10: 5

## 2022-07-15 ASSESSMENT — PAIN DESCRIPTION - LOCATION: LOCATION: ARM

## 2022-07-15 ASSESSMENT — PAIN DESCRIPTION - DESCRIPTORS: DESCRIPTORS: ACHING;SORE;SHARP

## 2022-07-15 NOTE — PROGRESS NOTES
[address]  Consult Note/H&P/Progress Note      Ry Adkins RECORD NUMBER:  [id number]  AGE: 62 y.o. RACE White (non-)  GENDER: female  : 1960  EPISODE DATE:  7/15/2022       Subjective:      CC (Chief Complaint) and HISTORY of PRESENT ILLNESS (HPI):    Irasema Salcedo is a 62 y.o. female who presents today for wound/ulcer evaluation. She saw me for her first visit at the wound center on 7/15/22. She was admitted by the hospitalists on 22 from the ER with bilateral breast cellulitis. She was s/p I&D of left axillary abscess done at an urgent treatment center in late 2022. She was placed on doxycycline but her infection progressed and she came to the ER where she was admitted by the hospitalist for IV antibiotics for progressive left breast and left cellulitis. She had an I&D performed by Dr. Guerrero Alvarado on 7/3/2022 for MRSA left outer breast/ left axillary abscess  She has 2 surgical incisions with open wounds on the left side  No recent surgery on the right side  She was discharged on Bactrim and iodoform packing. She is not up-to-date with her mammograms as of 7/15/2022. This information was obtained from the patient  The following HPI elements were documented for the patient's wound:  Location: Left breast wound  Duration: Since 7/3/2022  Severity: Moderate severity; grew MRSA  Context:    Modifying Factors:  Nothing makes better or worse  Quality:    Timing:     Associated Signs and Symptoms: No fevers      Ulcer Identification:  Ulcer Type: Infectious  Contributing Factors: No tobacco and no history of diabetes         22 CT chest  Hx: Left-sided breast pain. Bilateral axillary abscesses status post I and D 2 days prior. COMPARISON: Same day chest radiograph. Multiple axial images were obtained through the chest after intravenous injection of 80 mL of Isovue 370.   Radiation dose BREAST SURGERY Left 7/3/2022    BREAST INCISION AND DRAINAGE LEFT performed by Aida Stuart MD at [facility] SURGERY Left 2009       FAMILY HISTORY  History reviewed. No pertinent family history. SOCIAL HISTORY  Social History     Tobacco Use    Smoking status: Never    Smokeless tobacco: Never   Vaping Use    Vaping Use: Never used   Substance Use Topics    Alcohol use: Yes     Alcohol/week: 1.0 standard drink     Types: 1 Shots of liquor per week     Comment: Every 3 weeks    Drug use: Never       ALLERGIES  Allergies   Allergen Reactions    Bactrim [Sulfamethoxazole-Trimethoprim] Rash       MEDICATIONS  No current outpatient medications on file prior to encounter. No current facility-administered medications on file prior to encounter. REVIEW OF SYSTEMS  The patient has no difficulty with chest pain or shortness of breath. No fever or chills. Comprehensive review of systems was otherwise unremarkable except as noted above. Objective:   Physical Exam:   Recent vitals (if inpt):  Patient Vitals for the past 24 hrs:   BP Temp Temp src Pulse Resp Height Weight   07/15/22 0820 110/80 97.9 °F (36.6 °C) Temporal 78 18 -- --   07/15/22 0815 -- -- -- -- -- 5' 6\" (1.676 m) 134 lb 3.2 oz (60.9 kg)       /80   Pulse 78   Temp 97.9 °F (36.6 °C) (Temporal)   Resp 18   Ht 5' 6\" (1.676 m)   Wt 134 lb 3.2 oz (60.9 kg)   BMI 21.66 kg/m²   Wt Readings from Last 3 Encounters:   07/15/22 134 lb 3.2 oz (60.9 kg)   07/11/22 133 lb (60.3 kg)   07/03/22 139 lb (63 kg)     Constitutional: Alert, oriented, cooperative patient in no acute distress; appears stated age;  General appearance is within normal limits for wound care patient population. See the today's recorded vitals signs and constitutional data. Eyes: Pupils equal; Sclera are clear. EOMs intact; The eyes appear to track and move normally. The sclera are not injected. The conjunctive are clear.  The eyelids are normal. There is no scleral icterus. ENMT: There are no obvious external ear, nose, lip or mouth lesions. Nares normal; Neck: Overall contour of the neck is normal with no obvious neck masses. Gross hearing is within normal limits. No obvious neck masses  Resp:  Breathing is non-labored; normal rate and effort; no audible wheezing. No breast masses on either side. Left breast wound and left axillary wound as shown below    She has acute MRSA cellulitis and folliculitis of the RIGHT axillae  No axillary adenopathy        CV: RRR;  no JVD; No evidence of cyanosis of the upper extremities. The extremities are perfused without embolic sign, splinter hemorrhages, or petechia. GI: soft and non-distended without acute abnormality noted. Musculoskeletal: unremarkable with normal function. No embolic signs or cyanosis. Neuro:  Oriented; moves all 4; no focal deficits  Psychiatric: Judgement and insight are within normal limits for the wound care population of patients. Patient is oriented to person, place, and time. Recent and remote memory are within normal limits. Mood and affect are within normal limits. Integumentary (Skin/Wounds)  See inspection of wound(s) below. There are no other skin areas of palpable concern. See wound center documentation including photos in the [address] Wound Template made part of this record by reference.          Wound Care Documentation:    Wound 07/15/22 Chest Lateral;Left I&D on 7/1/22 in ER for abscess (Active)   Wound Image   07/15/22 0834   Wound Etiology Surgical 07/15/22 0834   Dressing Status Old drainage noted 07/15/22 0834   Wound Cleansed Cleansed with saline 07/15/22 0834   Dressing/Treatment Iodoform gauze 07/15/22 0834   Wound Length (cm) 2.8 cm 07/15/22 0834   Wound Width (cm) 1 cm 07/15/22 0834   Wound Depth (cm) 1.4 cm 07/15/22 0834   Wound Surface Area (cm^2) 2.8 cm^2 07/15/22 0834   Wound Volume (cm^3) 3.92 cm^3 07/15/22 0834   Undermining Starts ___ O'Clock 1 07/15/22 0834   Undermining Ends___ O'Clock 12 07/15/22 0834   Undermining Maxium Distance (cm) 2.5 07/15/22 0834   Wound Assessment Granulation tissue 07/15/22 0834   Drainage Amount Large 07/15/22 0834   Drainage Description Serosanguinous 07/15/22 0834   Odor Malodorous/putrid 07/15/22 0834   Dianh-wound Assessment Induration 07/15/22 0834   Wound Thickness Description not for Pressure Injury Full thickness 07/15/22 0834   Number of days: 0       Wound 07/15/22 Axilla Lateral;Left (Active)   Wound Image   07/15/22 0834   Wound Etiology Surgical 07/15/22 0834   Dressing Status Old drainage noted 07/15/22 0834   Wound Cleansed Cleansed with saline 07/15/22 0834   Dressing/Treatment Iodoform gauze 07/15/22 0834   Wound Length (cm) 0.7 cm 07/15/22 0834   Wound Width (cm) 0.3 cm 07/15/22 0834   Wound Depth (cm) 0.5 cm 07/15/22 0834   Wound Surface Area (cm^2) 0.21 cm^2 07/15/22 0834   Wound Volume (cm^3) 0.105 cm^3 07/15/22 0834   Undermining Starts ___ O'Clock 3 07/15/22 0834   Undermining Ends___ O'Clock 9 07/15/22 0834   Undermining Maxium Distance (cm) 0.5 07/15/22 0834   Wound Assessment Granulation tissue 07/15/22 0834   Drainage Amount Large 07/15/22 0834   Drainage Description Serosanguinous 07/15/22 0834   Odor Malodorous/putrid 07/15/22 0834   Dinah-wound Assessment Blanchable erythema 07/15/22 0834   Wound Thickness Description not for Pressure Injury Full thickness 07/15/22 0834   Number of days: 0              Amount and/or Complexity of Data Reviewed and Analyzed:  I reviewed and analyzed all of the unique labs and radiologic studies that are shown below as well as any that are in the HPI, and any that are in the expanded problem list below  *Each unique test, order, or document contributes to the combination of 2 or combination of 3 in Category 1 below. I also independently reviewed radiology images for studies I described above in the HPI or studies I have ordered.    For this visit I also reviewed old records and prior notes. No results for input(s): WBC, HGB, PLT, NA, K, CL, CO2, BUN, CREA, GLU, INR, APTT, ALT, AML, AML, LCAD, PCO2, PO2, HCO3 in the last 72 hours. Invalid input(s): PTP, TBIL, TBILI, CBIL, SGOT, GPT, AP, LPSE, NH4, TROPT, TROIQ,  PH  No results for input(s): INR, APTT, ALT, AML in the last 72 hours. Invalid input(s): PTP, TBIL, TBILI, CBIL, SGOT, GPT, AP, LPSE    Most recent blood counts (CBC) review  Lab Results   Component Value Date    WBC 3.3 (L) 07/01/2022    HGB 10.8 (L) 07/01/2022    HCT 32.7 (L) 07/01/2022    MCV 86.5 07/01/2022     07/01/2022     Most recent chemistry (BMP) test review   Lab Results   Component Value Date/Time     07/02/2022 04:05 AM    K 3.7 07/02/2022 04:05 AM     07/02/2022 04:05 AM    CO2 27 07/02/2022 04:05 AM    BUN 14 07/02/2022 04:05 AM    CREATININE 0.60 07/02/2022 04:05 AM    GLUCOSE 82 07/02/2022 04:05 AM    CALCIUM 8.0 07/02/2022 04:05 AM      Most recent Liver enzyme test review  Lab Results   Component Value Date    ALT 31 07/02/2022    AST 43 (H) 07/02/2022    ALKPHOS 67 07/02/2022    BILITOT 0.4 07/02/2022     Most recent coagulation (coags) review  @lastinr@  No results found for: INR, APTT, AML, AML, LCAD    Diabetic assessment  No results found for: LABA1C  No results found for: EAG    Nutritional assessment screen to assess wound healing ability:  Lab Results   Component Value Date    LABALBU 2.3 (L) 07/02/2022     No results found for: TP, ALBR, ALB    Xray Result (most recent):  XR CHEST PORTABLE 07/01/2022    Narrative  EXAM: Single view chest radiograph. INDICATION: Left-sided breast pain. COMPARISON: None. FINDINGS:  No focal lung consolidation. No pneumothorax. No pleural effusion. The heart is  normal in size. No evidence of acute osseous abnormality. Impression  1. No acute cardiopulmonary abnormality.     CT Result (most recent):  CT CHEST W CONTRAST 07/01/2022    Narrative  EXAM: Chest CT with contrast.  INDICATION: Left-sided breast pain. Bilateral axillary abscesses status post I  and D 2 days prior. COMPARISON: Same day chest radiograph. Multiple axial images were obtained through the chest after intravenous  injection of 80 mL of Isovue 370. Radiation dose reduction techniques were used  for this study: All CT scans performed at this facility use one or all of the  following: Automated exposure control, adjustment of the mA and/or kVp according  to patient's size, iterative reconstruction. FINDINGS:  LUNGS/PLEURA:  The central airways are patent. The lungs are within normal limits. No  suspicious pulmonary nodules. No pleural effusion or pneumothorax. No focal  pleural lesion. MEDIASTINUM:  The thoracic aorta and main pulmonary artery are within normal limits. The heart  is normal in size. No pericardial effusion. No suspicious thyroid nodule. The  esophagus is normal. No pathologic adenopathy. BONES AND SOFT TISSUES:  Bilateral axillary subcutaneous soft tissue fat stranding and small pockets of  air consistent with recent bilateral axillary incision and drainages. There  appears to be a small drain of the left axilla. There is soft tissue thickening  without a clear organized fluid collection of the left or right axilla. There is  asymmetric diffuse trabecular thickening and skin thickening with edematous  change of the left breast, query mastitis. UPPER ABDOMEN:  Unremarkable. Impression  Bilateral axillary subcutaneous soft tissue fat stranding and small pockets of  air consistent with recent bilateral axillary incision and drainages. There  appears to be a small drain of the left axilla. There is soft tissue thickening  without a clear organized fluid collection of the left or right axilla. There is  asymmetric diffuse trabecular thickening and skin thickening with edematous  change of the left breast, query mastitis.  Patient should be evaluated in the  breast center if not already seen preferably a few days after antibiotic therapy  within the next 2-3 weeks to ensure no evidence of inflammatory breast cancer. Admission date (for inpatients): 7/15/2022   Day of Surgery  * No procedures listed *    Assessment:      Problem List Items Addressed This Visit          Other    Cellulitis of left breast - Primary    Open wound of left axillary region    MRSA right axillary cellulitis    Open wound of left breast       [unfilled]    Active Problems:    * No active hospital problems. *  Resolved Problems:    * No resolved hospital problems. *      Patient Active Problem List    Diagnosis Date Noted    Open wound of left axillary region 07/15/2022     Priority: Medium    MRSA right axillary cellulitis 07/15/2022     Priority: Medium    Open wound of left breast 07/15/2022     Priority: Medium    Cellulitis of axilla, right 07/15/2022     Priority: Medium    MRSA cellulitis 07/15/2022     Priority: Medium    Cellulitis of left breast 07/01/2022     Priority: Medium           Plan:     Number and Complexity of Problems addressed and   Risks of complications and/or morbidity of management    Treatment Note please see attached Discharge Instructions  Any procedures done today in the wound center (if documented in a separate note) in [facility] are made part of this record by reference  Written patient dismissal instructions given to patient or POA. MRSA infection of right axilla  This has not required surgical drainage  I prescribed doxycycline every 12 hours for 2 weeks  I considered Zyvox but she has no health insurance  We will watch this right axilla and drainage and if needed. I recommended Hibiclens scrub body washes to decolonize her given her recent history of MRSA. Left breast abscess I&D site  She will packed this area with dry 4 x 4's for 4 days and then switch to saline moistened 4 x 4's daily  We will see her back in 1 week.   She has no family or friends with which we can teach. She is on her phone for wound care. We discussed home health wound care but she has no health insurance. Left axillary abscess I&D site  The orifice is quite small. She will packed quarter inch iodoform into the site daily. Wound care supplies and teaching given. I asked her to call us if she has difficulty getting this packed and we will go ahead and consult home health even though she does not have health insurance if that occurs. She will need bilateral screening mammograms when she has completely healed  To rule out breast malignancy. Wound Care  No orders of the defined types were placed in this encounter. [unfilled]            Level of MDM (2/3 elements below)  Number and Complexity of Problems Addressed Amount and/or Complexity of Data to be Reviewed and Analyzed  *Each unique test, order, or document contributes to the combination of 2 or combination of 3 in Category 1 below. Risk of Complications and/or Morbidity or Mortality of pt Management     27883  08572 SF Minimal  ?1self-limited or minor problem Minimal or none Minimal risk of morbidity from additional diagnostic testing or Rx   06422  21089 Low Low  ? 2or more self-limited or minor problems;    or  ? 1stable chronic illness;    or  ?1acute, uncomplicated illness or injury   Limited  (Must meet the requirements of at least 1 of the 2 categories)  Category 1: Tests and documents   ? Any combination of 2 from the following:  ?Review of prior external note(s) from each unique source*;  ?review of the result(s) of each unique test*;   ?ordering of each unique test*    or   Category 2: Assessment requiring an independent historian(s)  (For the categories of independent interpretation of tests and discussion of management or test interpretation, see moderate or high) Low risk of morbidity from additional diagnostic testing or treatment     31867  90846 Mod Moderate  ? 1or more chronic illnesses with exacerbation, progression, or side effects of treatment;    or  ?2or more stable chronic illnesses     or  ? 1undiagnosed new problem with uncertain prognosis--recent left breast abscess with no history of mammograms. It is uncertain if she has breast neoplasm or pathology. She will need mammogram after the acute infection has resolved to clear the uncertainty. 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 ;    or  ?1acute illness with systemic symptoms;    or  ?1acute complicated injury   Moderate  (Must meet the requirements of at least 1 out of 3 categories)  Category 1: Tests, documents, or independent historian(s)  ? Any combination of 3 from the following:   ?Review of prior external note(s) from each unique source*;  ?Review of the result(s) of each unique test*;  ?Ordering of each unique test*;  ?Assessment requiring an independent historian(s)    or  Category 2: Independent interpretation of tests   ? Independent interpretation of a test performed by another physician/other qualified health care professional (not separately reported);     or  Category 3: Discussion of management or test interpretation  ? Discussion of management or test interpretation with external physician/other qualified health care professional/appropriate source (not separately reported)   Moderate risk of morbidity from additional diagnostic testing or treatment  Examples only:  ?Prescription drug management - advanced wound care prescription Rx wound care products--doxycyline and iodoform  (eg Iodosorb, hydrofera, silvadene, collagen, puracol plus, optiloc, biologics - placenta, Theraskin, Apligraf). Note also that if home health care is involved, they will not apply topical therapies without a prescription/order from physician      ? Decision regarding minor surgery with identified patient or procedure risk factors  ? Decision regarding elective major surgery without identified patient or procedure risk Sarah Martinez MD on 7/15/2022 at 9:13 AM

## 2022-07-15 NOTE — WOUND CARE
Discharge Instructions for  Radha Harrell  [address] [facility] [address]  [address], [address]  Phone [phone]   Fax [phone]      NAME:  Yamile Vences  YOB: 1960  MEDICAL RECORD NUMBER:  [id number]  DATE:  7/15/2022    Return Appointment:   1 week with Janett Goode MD      Instructions: Left chest & axilla wounds:  Clean wounds with saline. Pack axilla wound with 1/4 inch iodoform gauze, cover with bandage and change daily. Pack chest wound with dry gauze, cover with bandage and change daily. Purchase bottle of Hibiclens from pharmacy and shower with this soap 3 times over the next couple days. Increase protein intake as this greatly facilitates wound healing. Should you experience increased redness, swelling, pain, foul odor, size of wound(s), or have a temperature over 101 degrees please contact the [address] [place] [address] Road at [phone] or if after hours contact your primary care physician or go to the hospital emergency department. PLEASE NOTE: IF YOU ARE UNABLE TO OBTAIN WOUND SUPPLIES, CONTINUE TO USE THE SUPPLIES YOU HAVE AVAILABLE UNTIL YOU ARE ABLE TO REACH US. IT IS MOST IMPORTANT TO KEEP THE WOUND COVERED AT ALL TIMES.     Electronically signed Amisha Castillo RN on 7/15/2022 at 8:39 AM

## 2022-07-15 NOTE — DISCHARGE INSTRUCTIONS
Discharge Instructions for  Radha Harrell  [address] [facility] [address]  [address], [address]  Phone [phone]  Fax [phone]        NAME:  Manuela Su  YOB: 1960  MEDICAL RECORD NUMBER:  [id number]  DATE:  7/15/2022     Return Appointment:   1 week with Sulma Osorio MD        Instructions: Left chest & axilla wounds:  Clean wounds with saline. Pack axilla wound with 1/4 inch iodoform gauze, cover with bandage and change daily. Pack chest wound with dry gauze, cover with bandage and change daily. Purchase bottle of Hibiclens from pharmacy and shower with this soap 3 times over the next couple days. Increase protein intake as this greatly facilitates wound healing. Should you experience increased redness, swelling, pain, foul odor, size of wound(s), or have a temperature over 101 degrees please contact the [address] [place] [address] Road at [phone] or if after hours contact your primary care physician or go to the hospital emergency department. PLEASE NOTE: IF YOU ARE UNABLE TO OBTAIN WOUND SUPPLIES, CONTINUE TO USE THE SUPPLIES YOU HAVE AVAILABLE UNTIL YOU ARE ABLE TO REACH US. IT IS MOST IMPORTANT TO KEEP THE WOUND COVERED AT ALL TIMES.      Electronically signed Arslan Banegas RN on 7/15/2022 at 8:39 AM

## 2022-07-15 NOTE — WOUND CARE
[address] [place] [address] [address], [address] [facility]  Phone [phone]   Fax [phone]      NAME:  Mel Shea  YOB: 1960  MEDICAL RECORD NUMBER:  [id number]  DATE:  7/15/2022    Patient may return to work as of July 15th, 2022 without restrictions. Please contact our office with any questions.     Dr. Lili Ragsdale          Electronically signed Uriel Ziegler RN on 7/15/2022 at 9:01 AM

## 2022-07-16 ENCOUNTER — HOSPITAL ENCOUNTER (EMERGENCY)
Age: 62
Discharge: HOME | End: 2022-07-16
Attending: EMERGENCY MEDICINE

## 2022-07-16 VITALS
OXYGEN SATURATION: 98 % | RESPIRATION RATE: 18 BRPM | WEIGHT: 134 LBS | TEMPERATURE: 98.5 F | SYSTOLIC BLOOD PRESSURE: 124 MMHG | DIASTOLIC BLOOD PRESSURE: 67 MMHG | HEART RATE: 74 BPM | BODY MASS INDEX: 21.53 KG/M2 | HEIGHT: 66 IN

## 2022-07-16 DIAGNOSIS — Z51.89 WOUND CHECK, ABSCESS: Primary | ICD-10-CM

## 2022-07-16 PROCEDURE — 99282 EMERGENCY DEPT VISIT SF MDM: CPT

## 2022-07-16 ASSESSMENT — ENCOUNTER SYMPTOMS
RESPIRATORY NEGATIVE: 1
EYES NEGATIVE: 1
GASTROINTESTINAL NEGATIVE: 1
ALLERGIC/IMMUNOLOGIC NEGATIVE: 1

## 2022-07-16 ASSESSMENT — PAIN SCALES - GENERAL: PAINLEVEL_OUTOF10: 5

## 2022-07-16 ASSESSMENT — PAIN - FUNCTIONAL ASSESSMENT: PAIN_FUNCTIONAL_ASSESSMENT: 0-10

## 2022-07-17 NOTE — ED NOTES
Patient to be seen by PA in triage. PA states OK to DC patient. Patient to leave prior to registration and prior to DC paperwork at this time.       Isidoro Aquino RN  07/16/22 [id number]

## 2022-07-17 NOTE — ED TRIAGE NOTES
Pt arrived via POV c/o wound check on the left underarm.  Pt states that she was d/c from the wound clinic and needs help changing the dressing

## 2022-07-17 NOTE — ED PROVIDER NOTES
[facility] Emergency Department Provider Note                   PCP:                FÉLIX Serna CNP               Age: 62 y.o. Sex: female       ICD-10-CM    1. Wound check, abscess  Z51.89           DISPOSITION Decision To Discharge 07/16/2022 09:40:15 PM        MDM  Risk of Complications, Morbidity, and/or Mortality  Presenting problems: low  Diagnostic procedures: low  Management options: low  General comments: Dressing was changed and a clean one was placed. She will call the wound clinic on Monday so she can be seen there for dressing changes. Return to the ED sooner if worsening in any way. She is stable for discharge and ambulatory out of the ED without difficulty at this time. No orders of the defined types were placed in this encounter. Vic Delgado is a 62 y.o. female who presents to the Emergency Department with chief complaint of    Chief Complaint   Patient presents with    Wound Check      Patient is here for a dressing change. She states it is in her left axillary area and is difficult to change on her own with one hand. She was going to the wound clinic and they told her to do it herself over the weekend but she cannot reach the area. She is not having any fever or other new symptoms. She is doing quite well otherwise. No chest pain, shortness of breath, upper abdominal pain, dizziness, weakness, dyspnea exertion, orthopnea, swelling/tingling or weakness to their arms or legs, trouble with urination or bowel movements or other new symptoms. They did ambulate to the room without difficulty and is well hydrated. The history is provided by the patient. Review of Systems   Constitutional: Negative. Negative for fever. HENT: Negative. Eyes: Negative. Respiratory: Negative. Cardiovascular: Negative. Gastrointestinal: Negative. Endocrine: Negative. Genitourinary: Negative. Musculoskeletal: Negative. Skin:  Positive for wound. Allergic/Immunologic: Negative. Neurological: Negative. Hematological: Negative. Psychiatric/Behavioral: Negative. All other systems reviewed and are negative. No past medical history on file. Past Surgical History:   Procedure Laterality Date    BREAST SURGERY Left 7/3/2022    BRE[facility] [address] [facility]        No family history on file. Social Connections: Not on file        Allergies   Allergen Reactions    Bactrim [Sulfamethoxazole-Trimethoprim] Rash        Vitals signs and nursing note reviewed. Patient Vitals for the past 4 hrs:   Temp Pulse Resp BP SpO2   07/16/22 2052 98.5 °F (36.9 °C) 74 18 124/67 98 %          Physical Exam  Vitals and nursing note reviewed. Constitutional:       Appearance: Normal appearance. HENT:      Head: Normocephalic and atraumatic. Right Ear: External ear normal.      Left Ear: External ear normal.      Nose: Nose normal.      Mouth/Throat:      Mouth: Mucous membranes are moist.   Eyes:      Extraocular Movements: Extraocular movements intact. Conjunctiva/sclera: Conjunctivae normal.      Pupils: Pupils are equal, round, and reactive to light. Cardiovascular:      Rate and Rhythm: Normal rate. Pulses: Normal pulses. Pulmonary:      Effort: Pulmonary effort is normal.   Chest:          Comments: Healing wound to the left mid axillary line area. Dressing removed. No surrounding cellulitis, tenderness or warmth. No drainage. Abdominal:      General: Abdomen is flat. Palpations: Abdomen is soft. Musculoskeletal:         General: Normal range of motion. Cervical back: Normal range of motion. Skin:     General: Skin is warm. Capillary Refill: Capillary refill takes less than 2 seconds. Neurological:      General: No focal deficit present. Mental Status: She is alert and oriented to person, place, and time.  Mental status is at baseline. Psychiatric:         Mood and Affect: Mood normal.         Behavior: Behavior normal.         Thought Content: Thought content normal.         Judgment: Judgment normal.        Procedures      Labs Reviewed - No data to display     No orders to display                          Voice dictation software was used during the making of this note. This software is not perfect and grammatical and other typographical errors may be present. This note has not been completely proofread for errors.      PRAVEEN Chacon  07/16/22 [id number]

## 2022-07-19 ENCOUNTER — HOSPITAL ENCOUNTER (OUTPATIENT)
Dept: WOUND CARE | Age: 62
Setting detail: RECURRING SERIES
Discharge: HOME | End: 2022-07-19

## 2022-07-19 VITALS — TEMPERATURE: 97.9 F | HEART RATE: 87 BPM | DIASTOLIC BLOOD PRESSURE: 71 MMHG | SYSTOLIC BLOOD PRESSURE: 117 MMHG

## 2022-07-19 DIAGNOSIS — L03.90 MRSA CELLULITIS: ICD-10-CM

## 2022-07-19 DIAGNOSIS — B95.62 MRSA CELLULITIS: ICD-10-CM

## 2022-07-19 DIAGNOSIS — L03.111 CELLULITIS OF AXILLA, RIGHT: ICD-10-CM

## 2022-07-19 DIAGNOSIS — B95.62 CELLULITIS OF RIGHT FOOT DUE TO METHICILLIN-RESISTANT STAPHYLOCOCCUS AUREUS: ICD-10-CM

## 2022-07-19 DIAGNOSIS — S41.102D OPEN WOUND OF LEFT AXILLARY REGION, SUBSEQUENT ENCOUNTER: Primary | ICD-10-CM

## 2022-07-19 DIAGNOSIS — L03.115 CELLULITIS OF RIGHT FOOT DUE TO METHICILLIN-RESISTANT STAPHYLOCOCCUS AUREUS: ICD-10-CM

## 2022-07-19 DIAGNOSIS — N61.0 CELLULITIS OF LEFT BREAST: ICD-10-CM

## 2022-07-19 PROCEDURE — 99213 OFFICE O/P EST LOW 20 MIN: CPT

## 2022-07-19 NOTE — FLOWSHEET NOTE
07/19/22 0925   Wound 07/15/22 Chest Lateral;Left I&D on 7/1/22 in ER for abscess   Date First Assessed/Time First Assessed: 07/15/22 0824   Present on Hospital Admission: Yes  Primary Wound Type: Surgical Type  Location: Chest  Wound Location Orientation: Lateral;Left  Wound Description (Comments): I&D on 7/1/22 in ER for abscess   Wound Image    Wound Etiology Surgical   Dressing Status Clean;Dry; Intact   Wound Cleansed Cleansed with saline   Dressing/Treatment Iodoform gauze   Wound Length (cm) 2.5 cm   Wound Width (cm) 0.9 cm   Wound Depth (cm) 1.3 cm   Wound Surface Area (cm^2) 2.25 cm^2   Change in Wound Size % (l*w) 19.64   Wound Volume (cm^3) 2.925 cm^3   Wound Healing % 25   Undermining Starts ___ O'Clock 1   Undermining Ends___ O'Clock 12   Undermining Maxium Distance (cm) 2   Wound Assessment Granulation tissue   Drainage Amount Small   Drainage Description Serous   Odor None   Dinah-wound Assessment Intact   Wound Thickness Description not for Pressure Injury Full thickness   Wound 07/15/22 Axilla Lateral;Left   Date First Assessed/Time First Assessed: 07/15/22 0834   Present on Hospital Admission: Yes  Primary Wound Type: Surgical Type  Location: Axilla  Wound Location Orientation: Lateral;Left   Wound Image    Wound Etiology Surgical   Dressing Status Clean;Dry; Intact   Wound Cleansed Cleansed with saline   Dressing/Treatment Iodoform gauze   Wound Length (cm) 0.5 cm   Wound Width (cm) 0.2 cm   Wound Depth (cm) 0.4 cm   Wound Surface Area (cm^2) 0.1 cm^2   Change in Wound Size % (l*w) 52.38   Wound Volume (cm^3) 0.04 cm^3   Wound Healing % 62   Undermining Starts ___ O'Clock 3   Undermining Ends___ O'Clock 9   Undermining Maxium Distance (cm) . 2   Wound Assessment Granulation tissue   Drainage Amount None   Odor None   Dinah-wound Assessment Intact   Wound Thickness Description not for Pressure Injury Full thickness

## 2022-07-21 ENCOUNTER — HOSPITAL ENCOUNTER (OUTPATIENT)
Dept: WOUND CARE | Age: 62
Setting detail: RECURRING SERIES
Discharge: HOME | End: 2022-07-21

## 2022-07-21 VITALS
BODY MASS INDEX: 22.05 KG/M2 | OXYGEN SATURATION: 95 % | RESPIRATION RATE: 18 BRPM | SYSTOLIC BLOOD PRESSURE: 115 MMHG | DIASTOLIC BLOOD PRESSURE: 65 MMHG | TEMPERATURE: 98.5 F | WEIGHT: 137.2 LBS | HEIGHT: 66 IN | HEART RATE: 88 BPM

## 2022-07-21 DIAGNOSIS — L03.111 CELLULITIS OF AXILLA, RIGHT: ICD-10-CM

## 2022-07-21 DIAGNOSIS — L03.90 MRSA CELLULITIS: ICD-10-CM

## 2022-07-21 DIAGNOSIS — B95.62 CELLULITIS OF RIGHT FOOT DUE TO METHICILLIN-RESISTANT STAPHYLOCOCCUS AUREUS: ICD-10-CM

## 2022-07-21 DIAGNOSIS — S21.002A OPEN WOUND OF LEFT BREAST, INITIAL ENCOUNTER: ICD-10-CM

## 2022-07-21 DIAGNOSIS — S41.102A OPEN WOUND OF LEFT AXILLARY REGION, INITIAL ENCOUNTER: Primary | ICD-10-CM

## 2022-07-21 DIAGNOSIS — N61.0 CELLULITIS OF LEFT BREAST: ICD-10-CM

## 2022-07-21 DIAGNOSIS — L03.115 CELLULITIS OF RIGHT FOOT DUE TO METHICILLIN-RESISTANT STAPHYLOCOCCUS AUREUS: ICD-10-CM

## 2022-07-21 DIAGNOSIS — B95.62 MRSA CELLULITIS: ICD-10-CM

## 2022-07-21 PROCEDURE — 99213 OFFICE O/P EST LOW 20 MIN: CPT

## 2022-07-21 RX ORDER — LIDOCAINE 40 MG/G
CREAM TOPICAL ONCE
Status: CANCELLED | OUTPATIENT
Start: 2022-07-21 | End: 2022-07-21

## 2022-07-21 RX ORDER — LIDOCAINE HYDROCHLORIDE 20 MG/ML
JELLY TOPICAL ONCE
Status: CANCELLED | OUTPATIENT
Start: 2022-07-21 | End: 2022-07-21

## 2022-07-21 RX ORDER — GINSENG 100 MG
CAPSULE ORAL ONCE
Status: CANCELLED | OUTPATIENT
Start: 2022-07-21 | End: 2022-07-21

## 2022-07-21 RX ORDER — BETAMETHASONE DIPROPIONATE 0.05 %
OINTMENT (GRAM) TOPICAL ONCE
Status: CANCELLED | OUTPATIENT
Start: 2022-07-21 | End: 2022-07-21

## 2022-07-21 RX ORDER — LIDOCAINE HYDROCHLORIDE 40 MG/ML
SOLUTION TOPICAL ONCE
Status: CANCELLED | OUTPATIENT
Start: 2022-07-21 | End: 2022-07-21

## 2022-07-21 RX ORDER — CLOBETASOL PROPIONATE 0.5 MG/G
OINTMENT TOPICAL ONCE
Status: CANCELLED | OUTPATIENT
Start: 2022-07-21 | End: 2022-07-21

## 2022-07-21 RX ORDER — GENTAMICIN SULFATE 1 MG/G
OINTMENT TOPICAL ONCE
Status: CANCELLED | OUTPATIENT
Start: 2022-07-21 | End: 2022-07-21

## 2022-07-21 RX ORDER — BACITRACIN, NEOMYCIN, POLYMYXIN B 400; 3.5; 5 [USP'U]/G; MG/G; [USP'U]/G
OINTMENT TOPICAL ONCE
Status: CANCELLED | OUTPATIENT
Start: 2022-07-21 | End: 2022-07-21

## 2022-07-21 RX ORDER — BACITRACIN ZINC AND POLYMYXIN B SULFATE 500; 1000 [USP'U]/G; [USP'U]/G
OINTMENT TOPICAL ONCE
Status: CANCELLED | OUTPATIENT
Start: 2022-07-21 | End: 2022-07-21

## 2022-07-21 RX ORDER — LIDOCAINE 50 MG/G
OINTMENT TOPICAL ONCE
Status: CANCELLED | OUTPATIENT
Start: 2022-07-21 | End: 2022-07-21

## 2022-07-21 ASSESSMENT — PAIN DESCRIPTION - ORIENTATION: ORIENTATION: LEFT

## 2022-07-21 ASSESSMENT — PAIN DESCRIPTION - DESCRIPTORS: DESCRIPTORS: ACHING

## 2022-07-21 ASSESSMENT — PAIN DESCRIPTION - ONSET: ONSET: GRADUAL

## 2022-07-21 ASSESSMENT — PAIN DESCRIPTION - FREQUENCY: FREQUENCY: INTERMITTENT

## 2022-07-21 ASSESSMENT — PAIN DESCRIPTION - LOCATION: LOCATION: CHEST

## 2022-07-21 ASSESSMENT — PAIN SCALES - GENERAL: PAINLEVEL_OUTOF10: 5

## 2022-07-22 ENCOUNTER — HOSPITAL ENCOUNTER (OUTPATIENT)
Dept: WOUND CARE | Age: 62
Setting detail: RECURRING SERIES
Discharge: HOME | End: 2022-07-22

## 2022-07-22 VITALS — DIASTOLIC BLOOD PRESSURE: 69 MMHG | RESPIRATION RATE: 20 BRPM | SYSTOLIC BLOOD PRESSURE: 115 MMHG | HEART RATE: 83 BPM

## 2022-07-22 DIAGNOSIS — L03.90 MRSA CELLULITIS: ICD-10-CM

## 2022-07-22 DIAGNOSIS — S41.102D OPEN WOUND OF LEFT AXILLARY REGION, SUBSEQUENT ENCOUNTER: Primary | ICD-10-CM

## 2022-07-22 DIAGNOSIS — L03.115 CELLULITIS OF RIGHT FOOT DUE TO METHICILLIN-RESISTANT STAPHYLOCOCCUS AUREUS: ICD-10-CM

## 2022-07-22 DIAGNOSIS — S21.002D OPEN WOUND OF LEFT BREAST, SUBSEQUENT ENCOUNTER: ICD-10-CM

## 2022-07-22 DIAGNOSIS — B95.62 MRSA CELLULITIS: ICD-10-CM

## 2022-07-22 DIAGNOSIS — B95.62 CELLULITIS OF RIGHT FOOT DUE TO METHICILLIN-RESISTANT STAPHYLOCOCCUS AUREUS: ICD-10-CM

## 2022-07-22 DIAGNOSIS — N61.0 CELLULITIS OF LEFT BREAST: ICD-10-CM

## 2022-07-22 DIAGNOSIS — L03.111 CELLULITIS OF AXILLA, RIGHT: ICD-10-CM

## 2022-07-22 DIAGNOSIS — S41.102A OPEN WOUND OF LEFT AXILLARY REGION, INITIAL ENCOUNTER: ICD-10-CM

## 2022-07-22 DIAGNOSIS — S21.002A OPEN WOUND OF LEFT BREAST, INITIAL ENCOUNTER: ICD-10-CM

## 2022-07-22 PROCEDURE — 99214 OFFICE O/P EST MOD 30 MIN: CPT | Performed by: SURGERY

## 2022-07-22 PROCEDURE — 99213 OFFICE O/P EST LOW 20 MIN: CPT

## 2022-07-22 RX ORDER — BACITRACIN ZINC AND POLYMYXIN B SULFATE 500; 1000 [USP'U]/G; [USP'U]/G
OINTMENT TOPICAL ONCE
Status: CANCELLED | OUTPATIENT
Start: 2022-07-22 | End: 2022-07-22

## 2022-07-22 RX ORDER — GENTAMICIN SULFATE 1 MG/G
OINTMENT TOPICAL ONCE
Status: CANCELLED | OUTPATIENT
Start: 2022-07-22 | End: 2022-07-22

## 2022-07-22 RX ORDER — GINSENG 100 MG
CAPSULE ORAL ONCE
Status: CANCELLED | OUTPATIENT
Start: 2022-07-22 | End: 2022-07-22

## 2022-07-22 RX ORDER — LIDOCAINE 50 MG/G
OINTMENT TOPICAL ONCE
Status: CANCELLED | OUTPATIENT
Start: 2022-07-22 | End: 2022-07-22

## 2022-07-22 RX ORDER — LIDOCAINE HYDROCHLORIDE 20 MG/ML
JELLY TOPICAL ONCE
Status: CANCELLED | OUTPATIENT
Start: 2022-07-22 | End: 2022-07-22

## 2022-07-22 RX ORDER — CLOBETASOL PROPIONATE 0.5 MG/G
OINTMENT TOPICAL ONCE
Status: CANCELLED | OUTPATIENT
Start: 2022-07-22 | End: 2022-07-22

## 2022-07-22 RX ORDER — BACITRACIN, NEOMYCIN, POLYMYXIN B 400; 3.5; 5 [USP'U]/G; MG/G; [USP'U]/G
OINTMENT TOPICAL ONCE
Status: CANCELLED | OUTPATIENT
Start: 2022-07-22 | End: 2022-07-22

## 2022-07-22 RX ORDER — LIDOCAINE 40 MG/G
CREAM TOPICAL ONCE
Status: CANCELLED | OUTPATIENT
Start: 2022-07-22 | End: 2022-07-22

## 2022-07-22 RX ORDER — BETAMETHASONE DIPROPIONATE 0.05 %
OINTMENT (GRAM) TOPICAL ONCE
Status: CANCELLED | OUTPATIENT
Start: 2022-07-22 | End: 2022-07-22

## 2022-07-22 RX ORDER — LIDOCAINE HYDROCHLORIDE 40 MG/ML
SOLUTION TOPICAL ONCE
Status: CANCELLED | OUTPATIENT
Start: 2022-07-22 | End: 2022-07-22

## 2022-07-22 NOTE — FLOWSHEET NOTE
07/22/22 1146   Wound 07/15/22 Chest Lateral;Left I&D on 7/1/22 in ER for abscess   Date First Assessed/Time First Assessed: 07/15/22 0824   Present on Hospital Admission: Yes  Primary Wound Type: Surgical Type  Location: Chest  Wound Location Orientation: Lateral;Left  Wound Description (Comments): I&D on 7/1/22 in ER for abscess   Wound Image    Wound Etiology Surgical   Dressing Status Old drainage noted   Wound Cleansed Cleansed with saline   Dressing/Treatment   (iodoform)   Wound Length (cm) 1.8 cm   Wound Width (cm) 0.4 cm   Wound Depth (cm) 0.9 cm   Wound Surface Area (cm^2) 0.72 cm^2   Change in Wound Size % (l*w) 74.29   Wound Volume (cm^3) 0.648 cm^3   Wound Healing % 83   Wound Assessment Granulation tissue   Drainage Amount Moderate   Drainage Description Serosanguinous   Odor None   Wound Thickness Description not for Pressure Injury Full thickness   Wound 07/15/22 Axilla Lateral;Left   Date First Assessed/Time First Assessed: 07/15/22 0834   Present on Hospital Admission: Yes  Primary Wound Type: Surgical Type  Location: Axilla  Wound Location Orientation: Lateral;Left   Wound Image    Wound Etiology Surgical   Dressing Status Clean;Dry; Intact   Wound Cleansed Cleansed with saline   Wound Length (cm) 0.1 cm   Wound Width (cm) 0.1 cm   Wound Depth (cm) 0.1 cm   Wound Surface Area (cm^2) 0.01 cm^2   Change in Wound Size % (l*w) 95.24   Wound Volume (cm^3) 0.001 cm^3   Wound Healing % 99   Wound Assessment Epithelialization   Drainage Amount None   Odor None   Wound Thickness Description not for Pressure Injury Full thickness

## 2022-07-22 NOTE — WOUND CARE
Discharge Instructions for  Radha Harrell  [address] [place] [address]  [address], [address]  Phone [phone]   Fax [phone]      NAME:  [place] Kishore  YOB: 1960  MEDICAL RECORD NUMBER:  [id number]  DATE:  7/22/2022    Return Appointment:   1 week with Salma Escoto MD  Dressing changes with clinician on Monday and Wednesday. Instructions:  Left chest & axilla wounds:  Clean wounds with saline. Pack chest wound with 1/4 inch iodoform gauze or the corner of one piece of slightly moistened gauze. Cover with bandage and change daily. May change in the wound center on Monday and Wednesday if needed. Increase protein intake as this greatly facilitates wound healing. Should you experience increased redness, swelling, pain, foul odor, size of wound(s), or have a temperature over 101 degrees please contact the [address] [place] [address] Road at [phone] or if after hours contact your primary care physician or go to the hospital emergency department. PLEASE NOTE: IF YOU ARE UNABLE TO OBTAIN WOUND SUPPLIES, CONTINUE TO USE THE SUPPLIES YOU HAVE AVAILABLE UNTIL YOU ARE ABLE TO REACH US. IT IS MOST IMPORTANT TO KEEP THE WOUND COVERED AT ALL TIMES.     Electronically signed Richard Guzman RN on 7/22/2022 at 12:00 PM

## 2022-07-22 NOTE — PROGRESS NOTES
reduction techniques were used for this study: All CT scans performed at this facility use one or all of the following: Automated exposure control, adjustment of the mA and/or kVp according to patient's size, iterative reconstruction. FINDINGS: LUNGS/PLEURA: The central airways are patent. The lungs are within normal limits. No suspicious pulmonary nodules. No pleural effusion or pneumothorax. No focal pleural lesion. MEDIASTINUM: The thoracic aorta and main pulmonary artery are within normal limits. The heart is normal in size. No pericardial effusion. No suspicious thyroid nodule. The esophagus is normal. No pathologic adenopathy. BONES AND SOFT TISSUES: Bilateral axillary subcutaneous soft tissue fat stranding and small pockets of air consistent with recent bilateral axillary incision and drainages. There appears to be a small drain of the left axilla. There is soft tissue thickening without a clear organized fluid collection of the left or right axilla. There is asymmetric diffuse trabecular thickening and skin thickening with edematous change of the left breast, query mastitis. UPPER ABDOMEN: Unremarkable. Bilateral axillary subcutaneous soft tissue fat stranding and small pockets of air consistent with recent bilateral axillary incision and drainages. There appears to be a small drain of the left axilla. There is soft tissue thickening without a clear organized fluid collection of the left or right axilla. There is asymmetric diffuse trabecular thickening and skin thickening with edematous change of the left breast, query mastitis. Patient should be evaluated in the breast center if not already seen preferably a few days after antibiotic therapy within the next 2-3 weeks to ensure no evidence of inflammatory breast cancer              PAST MEDICAL HISTORY  History reviewed. No pertinent past medical history.      PAST SURGICAL HISTORY  Past Surgical History:   Procedure Laterality Date no obvious external ear, nose, lip or mouth lesions. Nares normal; Neck: Overall contour of the neck is normal with no obvious neck masses. Gross hearing is within normal limits. No obvious neck masses  Resp:  Breathing is non-labored; normal rate and effort; no audible wheezing. No breast masses on either side. Left breast wound and left axillary wound as shown below    She has acute MRSA cellulitis and folliculitis of the RIGHT axillae  No axillary adenopathy        CV: RRR;  no JVD; No evidence of cyanosis of the upper extremities. The extremities are perfused without embolic sign, splinter hemorrhages, or petechia. GI: soft and non-distended without acute abnormality noted. Musculoskeletal: unremarkable with normal function. No embolic signs or cyanosis. Neuro:  Oriented; moves all 4; no focal deficits  Psychiatric: Judgement and insight are within normal limits for the wound care population of patients. Patient is oriented to person, place, and time. Recent and remote memory are within normal limits. Mood and affect are within normal limits. Integumentary (Skin/Wounds)  See inspection of wound(s) below. There are no other skin areas of palpable concern. See wound center documentation including photos in the [address] Wound Template made part of this record by reference.          Wound Care Documentation:    Wound 07/15/22 Chest Lateral;Left I&D on 7/1/22 in ER for abscess (Active)   Wound Image   07/22/22 1146   Wound Etiology Surgical 07/22/22 1146   Dressing Status Old drainage noted 07/22/22 1146   Wound Cleansed Cleansed with saline 07/22/22 1146   Dressing/Treatment ABD;Tape/Soft cloth adhesive tape 07/21/22 1338   Wound Length (cm) 1.8 cm 07/22/22 1146   Wound Width (cm) 0.4 cm 07/22/22 1146   Wound Depth (cm) 0.9 cm 07/22/22 1146   Wound Surface Area (cm^2) 0.72 cm^2 07/22/22 1146   Change in Wound Size % (l*w) 74.29 07/22/22 1146   Wound Volume (cm^3) 0.648 cm^3 07/22/22 1146   Wound Healing % 83 07/22/22 1146   Undermining Starts ___ O'Clock 1 07/19/22 0925   Undermining Ends___ O'Clock 12 07/19/22 0925   Undermining Maxium Distance (cm) 2 07/19/22 0925   Wound Assessment Granulation tissue 07/22/22 1146   Drainage Amount Moderate 07/22/22 1146   Drainage Description Serosanguinous 07/22/22 1146   Odor None 07/22/22 1146   [place]-wound Assessment Intact 07/21/22 1338   Wound Thickness Description not for Pressure Injury Full thickness 07/22/22 1146   Number of days: 7       Wound 07/15/22 Axilla Lateral;Left (Active)   Wound Image   07/22/22 1146   Wound Etiology Surgical 07/22/22 1146   Dressing Status Clean;Dry; Intact 07/22/22 1146   Wound Cleansed Cleansed with saline 07/22/22 1146   Dressing/Treatment Iodoform gauze;ABD;Tape/Soft cloth adhesive tape 07/21/22 1338   Wound Length (cm) 0.1 cm 07/22/22 1146   Wound Width (cm) 0.1 cm 07/22/22 1146   Wound Depth (cm) 0.1 cm 07/22/22 1146   Wound Surface Area (cm^2) 0.01 cm^2 07/22/22 1146   Change in Wound Size % (l*w) 95.24 07/22/22 1146   Wound Volume (cm^3) 0.001 cm^3 07/22/22 1146   Wound Healing % 99 07/22/22 1146   Undermining Starts ___ O'Clock 3 07/19/22 0925   Undermining Ends___ O'Clock 9 07/19/22 0925   Undermining Maxium Distance (cm) .2 07/19/22 0925   Wound Assessment Epithelialization 07/22/22 1146   Drainage Amount None 07/22/22 1146   Drainage Description Serosanguinous 07/15/22 0834   Odor None 07/22/22 1146   [place]-wound Assessment Intact 07/19/22 0925   Wound Thickness Description not for Pressure Injury Full thickness 07/22/22 1146   Number of days: 7                    Amount and/or Complexity of Data Reviewed and Analyzed:  I reviewed and analyzed all of the unique labs and radiologic studies that are shown below as well as any that are in the HPI, and any that are in the expanded problem list below  *Each unique test, order, or document contributes to the combination of 2 or combination of 3 in Category 1 below.  I also independently reviewed radiology images for studies I described above in the HPI or studies I have ordered. For this visit I also reviewed old records and prior notes. No results for input(s): WBC, HGB, PLT, NA, K, CL, CO2, BUN, CREA, GLU, INR, APTT, ALT, AML, AML, LCAD, PCO2, PO2, HCO3 in the last 72 hours. Invalid input(s): PTP, TBIL, TBILI, CBIL, SGOT, GPT, AP, LPSE, NH4, TROPT, TROIQ,  PH  No results for input(s): INR, APTT, ALT, AML in the last 72 hours. Invalid input(s): PTP, TBIL, TBILI, CBIL, SGOT, GPT, AP, LPSE    Most recent blood counts (CBC) review  Lab Results   Component Value Date    WBC 3.3 (L) 07/01/2022    HGB 10.8 (L) 07/01/2022    HCT 32.7 (L) 07/01/2022    MCV 86.5 07/01/2022     07/01/2022     Most recent chemistry (BMP) test review   Lab Results   Component Value Date/Time     07/02/2022 04:05 AM    K 3.7 07/02/2022 04:05 AM     07/02/2022 04:05 AM    CO2 27 07/02/2022 04:05 AM    BUN 14 07/02/2022 04:05 AM    CREATININE 0.60 07/02/2022 04:05 AM    GLUCOSE 82 07/02/2022 04:05 AM    CALCIUM 8.0 07/02/2022 04:05 AM      Most recent Liver enzyme test review  Lab Results   Component Value Date    ALT 31 07/02/2022    AST 43 (H) 07/02/2022    ALKPHOS 67 07/02/2022    BILITOT 0.4 07/02/2022     Most recent coagulation (coags) review  @lastinr@  No results found for: INR, APTT, AML, AML, LCAD    Diabetic assessment  No results found for: LABA1C  No results found for: EAG    Nutritional assessment screen to assess wound healing ability:  Lab Results   Component Value Date    LABALBU 2.3 (L) 07/02/2022     No results found for: TP, ALBR, ALB    Xray Result (most recent):  XR CHEST PORTABLE 07/01/2022    Narrative  EXAM: Single view chest radiograph. INDICATION: Left-sided breast pain. COMPARISON: None. FINDINGS:  No focal lung consolidation. No pneumothorax. No pleural effusion. The heart is  normal in size.  No evidence of acute osseous abnormality. Impression  1. No acute cardiopulmonary abnormality. CT Result (most recent):  CT CHEST W CONTRAST 07/01/2022    Narrative  EXAM: Chest CT with contrast.  INDICATION: Left-sided breast pain. Bilateral axillary abscesses status post I  and D 2 days prior. COMPARISON: Same day chest radiograph. Multiple axial images were obtained through the chest after intravenous  injection of 80 mL of Isovue 370. Radiation dose reduction techniques were used  for this study: All CT scans performed at this facility use one or all of the  following: Automated exposure control, adjustment of the mA and/or kVp according  to patient's size, iterative reconstruction. FINDINGS:  LUNGS/PLEURA:  The central airways are patent. The lungs are within normal limits. No  suspicious pulmonary nodules. No pleural effusion or pneumothorax. No focal  pleural lesion. MEDIASTINUM:  The thoracic aorta and main pulmonary artery are within normal limits. The heart  is normal in size. No pericardial effusion. No suspicious thyroid nodule. The  esophagus is normal. No pathologic adenopathy. BONES AND SOFT TISSUES:  Bilateral axillary subcutaneous soft tissue fat stranding and small pockets of  air consistent with recent bilateral axillary incision and drainages. There  appears to be a small drain of the left axilla. There is soft tissue thickening  without a clear organized fluid collection of the left or right axilla. There is  asymmetric diffuse trabecular thickening and skin thickening with edematous  change of the left breast, query mastitis. UPPER ABDOMEN:  Unremarkable. Impression  Bilateral axillary subcutaneous soft tissue fat stranding and small pockets of  air consistent with recent bilateral axillary incision and drainages. There  appears to be a small drain of the left axilla. There is soft tissue thickening  without a clear organized fluid collection of the left or right axilla.  There is  asymmetric diffuse trabecular thickening and skin thickening with edematous  change of the left breast, query mastitis. Patient should be evaluated in the  breast center if not already seen preferably a few days after antibiotic therapy  within the next 2-3 weeks to ensure no evidence of inflammatory breast cancer. Admission date (for inpatients): 7/22/2022   Day of Surgery  * No procedures listed *    Assessment:      Problem List Items Addressed This Visit          Other    Cellulitis of left breast    Relevant Orders    Initiate Outpatient Wound Care Protocol    Open wound of left axillary region - Primary    Relevant Orders    Initiate Outpatient Wound Care Protocol    MRSA right axillary cellulitis    Relevant Orders    Initiate Outpatient Wound Care Protocol    Open wound of left breast    Relevant Orders    Initiate Outpatient Wound Care Protocol    Cellulitis of axilla, right    Relevant Orders    Initiate Outpatient Wound Care Protocol    MRSA cellulitis    Relevant Orders    Initiate Outpatient Wound Care Protocol         [unfilled]    Active Problems:    Cellulitis of left breast    Open wound of left axillary region    MRSA right axillary cellulitis    Open wound of left breast    Cellulitis of axilla, right    MRSA cellulitis  Resolved Problems:    * No resolved hospital problems.  *      Patient Active Problem List    Diagnosis Date Noted    Open wound of left axillary region 07/15/2022     Priority: Medium    MRSA right axillary cellulitis 07/15/2022     Priority: Medium    Open wound of left breast 07/15/2022     Priority: Medium    Cellulitis of axilla, right 07/15/2022     Priority: Medium    MRSA cellulitis 07/15/2022     Priority: Medium    Cellulitis of left breast 07/01/2022     Priority: Medium           Plan:     Number and Complexity of Problems addressed and   Risks of complications and/or morbidity of management    Treatment Note please see attached Discharge Instructions  Any procedures done today in the wound center (if documented in a separate note) in [facility] are made part of this record by reference  Written patient dismissal instructions given to patient or POA. MRSA infection of right axilla  This has not required surgical drainage  I prescribed doxycycline every 12 hours for 2 weeks  I considered Zyvox but she has no health insurance  We will watch this right axilla and drainage and if needed. I recommended Hibiclens scrub body washes to decolonize her given her recent history of MRSA. Left breast abscess I&D site  She will packed this area with dry (and saline-moistened prn) 4x4's daily if possible every Monday Wednesday and Friday if she has to come to the wound center for dressing changes  We will see her back in 1 week. She has no family or friends with which we can teach. She is on her phone for wound care. We discussed home health wound care but she has no health insurance. Left axillary abscess I&D site--> healed as of 7/22/2022. The orifice was quite small. She will packed quarter inch iodoform into the site daily. Wound care supplies and teaching given. She was unable to pack it so she has been coming to the wound center Monday Wednesdays and Fridays  She does not have health insurance to cover home health services       She will need bilateral screening mammograms when she has completely healed  to rule out breast malignancy. Treatment significantly limited by social determinants of health. Ms. Óscar Serrano has no health coverage and cannot afford home health services. In addition she lives alone and has no help. It is difficult for her to reach and see these areas to pack them but she is doing the best she can. This impairs our ability to achieve resolution of her wounds.       Wound Care  Orders Placed This Encounter   Procedures    Initiate Outpatient Wound Care Protocol     Cleanse wound with saline    If wound contains bioburden or contamination cleanse with wound cleanser or antimicrobial solution     For normal periwound tissue without irritation nor maceration, apply topical skin protectant    For periwound tissue with irritation and/or maceration, apply zinc based product, topical steroid cream/ointment, or equivalent     For wounds with dry firm black eschar and/or without exudate, apply betadine and leave open to air      For wounds with scant/small to no exudate or drainage, apply wound gel, hydrocolloid, polymer, or equivalent and cover with secondary dressing/foam      For wounds with moderate/large exudate or drainage, apply alginate, hydrofiber, polymer, or equivalent and cover with secondary dressing/foam    For wounds with nonviable tissue requiring removal, apply chemical or mechanical debrider and cover with secondary dressing/foam    For wounds with tunneling, dead space, or cavity, fill or pack with strip/gauze/kerlex to fit and cover with secondary dressing/foam    For wounds with adequate granulation or epithelization, apply wound gel, hydrocolloid, polymer, collagen, or transparent film, and cover secondary dry dressing/foam    For wounds that need additional secondary dressing to help pad or control additional drainage/exudates, add foam, absorbent pad or hydrocolloid    For wounds with suspected or known infection, apply antimicrobial mesh and/or antimicrobial alginate/hydrofiber, or antimicrobial solution moistened gauze/kerlex, or equivalent and cover with secondary dressing/foam    Compression Management needed for edema control, apply multilayer compression or tubular garment or equivalent    Offloading Management needed for pressure relief, apply offloading shoe/boot or equivalent     Standing Status:   Standing     Number of Occurrences:   1         [unfilled]            Level of MDM (2/3 elements below)  Number and Complexity of Problems Addressed Amount and/or Complexity of Data to be Reviewed and Analyzed  *Each unique test, order, or document contributes to the combination of 2 or combination of 3 in Category 1 below. Risk of Complications and/or Morbidity or Mortality of pt Management     62079  39110 SF Minimal  ?1self-limited or minor problem Minimal or none Minimal risk of morbidity from additional diagnostic testing or Rx   67749  87144 Low Low  ? 2or more self-limited or minor problems;    or  ? 1stable chronic illness;    or  ?1acute, uncomplicated illness or injury   Limited  (Must meet the requirements of at least 1 of the 2 categories)  Category 1: Tests and documents   ? Any combination of 2 from the following:  ?Review of prior external note(s) from each unique source*;  ?review of the result(s) of each unique test*;   ?ordering of each unique test*    or   Category 2: Assessment requiring an independent historian(s)  (For the categories of independent interpretation of tests and discussion of management or test interpretation, see moderate or high) Low risk of morbidity from additional diagnostic testing or treatment     28706  43182 Mod Moderate  ? 1or more chronic illnesses with exacerbation, progression, or side effects of treatment;    or  ?2or more stable chronic illnesses     or  ? 1undiagnosed new problem with uncertain prognosis--recent left breast abscess with no history of mammograms. It is uncertain if she has breast neoplasm or pathology. She will need mammogram after the acute infection has resolved to clear the uncertainty. 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 ;    or  ?1acute illness with systemic symptoms;    or  ?1acute complicated injury   Moderate  (Must meet the requirements of at least 1 out of 3 categories)  Category 1: Tests, documents, or independent historian(s)  ? Any combination of 3 from the following:   ?Review of prior external note(s) from each unique source*;  ?Review of the result(s) of each unique test*;  ?Ordering of each unique test*;  ?Assessment requiring an independent historian(s)    or  Category 2: Independent interpretation of tests   ? Independent interpretation of a test performed by another physician/other qualified health care professional (not separately reported);     or  Category 3: Discussion of management or test interpretation  ? Discussion of management or test interpretation with external physician/other qualified health care professional/appropriate source (not separately reported)   Moderate risk of morbidity from additional diagnostic testing or treatment  Examples only:  ?Prescription drug management - advanced wound care prescription Rx wound care products--doxycyline and iodoform  (eg Iodosorb, hydrofera, silvadene, collagen, puracol plus, optiloc, biologics - placenta, Theraskin, Apligraf). Note also that if home health care is involved, they will not apply topical therapies without a prescription/order from physician      ? Decision regarding minor surgery with identified patient or procedure risk factors  ? Decision regarding elective major surgery without identified patient or procedure risk factors   ? Diagnosis or treatment significantly limited by social determinants of health       67856  76951 High High  ? 1or more chronic illnesses with severe exacerbation, progression, or side effects of treatment;    or  ?1 acute or chronic illness or injury that poses a threat to life or bodily function   Extensive  (Must meet the requirements of at least 2 out of 3 categories)  Category 1: Tests, documents, or independent historian(s)  ? Any combination of 3 from the following:   ?Review of prior external note(s) from each unique source*;  ?Review of the result(s) of each unique test*;   ?Ordering of each unique test*;   ?Assessment requiring an independent historian(s)    or   Category 2: Independent interpretation of tests   ? Independent interpretation of a test performed by another physician/other qualified health care professional (not separately reported);     or  Category 3: Discussion of management or test interpretation  ? Discussion of management or test interpretation with external physician/other qualified health care professional/appropriate source (not separately reported)   High risk of morbidity from additional diagnostic testing or treatment  Examples only:  ?Drug therapy requiring intensive monitoring for toxicity  ? Decision regarding elective major surgery with identified patient or procedure risk factors  ? Decision regarding emergency major surgery  ? Decision regarding hospitalization  ? Decision not to resuscitate or to de-escalate care because of poor prognosis                 I have personally performed a face-to-face diagnostic evaluation and management  service on this patient. I have independently seen the patient. I have independently obtained the above history from the patient/family. I have independently examined the patient with above findings. I have independently reviewed data/labs for this patient and developed the above plan of care (MDM).       Electronically signed by Claudia Lyons MD on 7/22/2022 at 12:02 PM

## 2022-07-25 ENCOUNTER — HOSPITAL ENCOUNTER (OUTPATIENT)
Dept: WOUND CARE | Age: 62
Setting detail: RECURRING SERIES
Discharge: HOME | End: 2022-07-25

## 2022-07-25 VITALS
OXYGEN SATURATION: 97 % | HEART RATE: 68 BPM | RESPIRATION RATE: 18 BRPM | DIASTOLIC BLOOD PRESSURE: 63 MMHG | TEMPERATURE: 99.1 F | SYSTOLIC BLOOD PRESSURE: 102 MMHG

## 2022-07-25 DIAGNOSIS — B95.62 MRSA CELLULITIS: ICD-10-CM

## 2022-07-25 DIAGNOSIS — N61.0 CELLULITIS OF LEFT BREAST: ICD-10-CM

## 2022-07-25 DIAGNOSIS — S21.002A OPEN WOUND OF LEFT BREAST, INITIAL ENCOUNTER: ICD-10-CM

## 2022-07-25 DIAGNOSIS — L03.111 CELLULITIS OF AXILLA, RIGHT: ICD-10-CM

## 2022-07-25 DIAGNOSIS — L03.90 MRSA CELLULITIS: ICD-10-CM

## 2022-07-25 DIAGNOSIS — S41.102A OPEN WOUND OF LEFT AXILLARY REGION, INITIAL ENCOUNTER: Primary | ICD-10-CM

## 2022-07-25 DIAGNOSIS — L03.115 CELLULITIS OF RIGHT FOOT DUE TO METHICILLIN-RESISTANT STAPHYLOCOCCUS AUREUS: ICD-10-CM

## 2022-07-25 DIAGNOSIS — B95.62 CELLULITIS OF RIGHT FOOT DUE TO METHICILLIN-RESISTANT STAPHYLOCOCCUS AUREUS: ICD-10-CM

## 2022-07-25 PROCEDURE — 99213 OFFICE O/P EST LOW 20 MIN: CPT

## 2022-07-25 RX ORDER — LIDOCAINE 50 MG/G
OINTMENT TOPICAL ONCE
Status: CANCELLED | OUTPATIENT
Start: 2022-07-25 | End: 2022-07-25

## 2022-07-25 RX ORDER — BETAMETHASONE DIPROPIONATE 0.05 %
OINTMENT (GRAM) TOPICAL ONCE
Status: CANCELLED | OUTPATIENT
Start: 2022-07-25 | End: 2022-07-25

## 2022-07-25 RX ORDER — GENTAMICIN SULFATE 1 MG/G
OINTMENT TOPICAL ONCE
Status: CANCELLED | OUTPATIENT
Start: 2022-07-25 | End: 2022-07-25

## 2022-07-25 RX ORDER — BACITRACIN, NEOMYCIN, POLYMYXIN B 400; 3.5; 5 [USP'U]/G; MG/G; [USP'U]/G
OINTMENT TOPICAL ONCE
Status: CANCELLED | OUTPATIENT
Start: 2022-07-25 | End: 2022-07-25

## 2022-07-25 RX ORDER — LIDOCAINE HYDROCHLORIDE 20 MG/ML
JELLY TOPICAL ONCE
Status: CANCELLED | OUTPATIENT
Start: 2022-07-25 | End: 2022-07-25

## 2022-07-25 RX ORDER — GINSENG 100 MG
CAPSULE ORAL ONCE
Status: CANCELLED | OUTPATIENT
Start: 2022-07-25 | End: 2022-07-25

## 2022-07-25 RX ORDER — CLOBETASOL PROPIONATE 0.5 MG/G
OINTMENT TOPICAL ONCE
Status: CANCELLED | OUTPATIENT
Start: 2022-07-25 | End: 2022-07-25

## 2022-07-25 RX ORDER — LIDOCAINE HYDROCHLORIDE 40 MG/ML
SOLUTION TOPICAL ONCE
Status: CANCELLED | OUTPATIENT
Start: 2022-07-25 | End: 2022-07-25

## 2022-07-25 RX ORDER — LIDOCAINE 40 MG/G
CREAM TOPICAL ONCE
Status: CANCELLED | OUTPATIENT
Start: 2022-07-25 | End: 2022-07-25

## 2022-07-25 RX ORDER — BACITRACIN ZINC AND POLYMYXIN B SULFATE 500; 1000 [USP'U]/G; [USP'U]/G
OINTMENT TOPICAL ONCE
Status: CANCELLED | OUTPATIENT
Start: 2022-07-25 | End: 2022-07-25

## 2022-07-25 ASSESSMENT — PAIN DESCRIPTION - LOCATION: LOCATION: CHEST

## 2022-07-25 ASSESSMENT — PAIN DESCRIPTION - DESCRIPTORS: DESCRIPTORS: THROBBING

## 2022-07-25 ASSESSMENT — PAIN SCALES - GENERAL: PAINLEVEL_OUTOF10: 5

## 2022-07-25 ASSESSMENT — PAIN DESCRIPTION - ORIENTATION: ORIENTATION: LEFT

## 2022-07-25 NOTE — FLOWSHEET NOTE
07/25/22 1048   Wound 07/15/22 Chest Lateral;Left I&D on 7/1/22 in ER for abscess   Date First Assessed/Time First Assessed: 07/15/22 0824   Present on Hospital Admission: Yes  Primary Wound Type: Surgical Type  Location: Chest  Wound Location Orientation: Lateral;Left  Wound Description (Comments): I&D on 7/1/22 in ER for abscess   Wound Image    Wound Etiology Surgical   Dressing Status Old drainage noted   Wound Cleansed Cleansed with saline   Dressing/Treatment Gauze dressing/dressing sponge  (Lg Coversite)   Wound Length (cm) 2 cm   Wound Width (cm) 0.7 cm   Wound Depth (cm) 0.7 cm   Wound Surface Area (cm^2) 1.4 cm^2   Change in Wound Size % (l*w) 50   Wound Volume (cm^3) 0.98 cm^3   Wound Healing % 75   Undermining Starts ___ O'Clock 12   Undermining Ends___ O'Clock 11   Undermining Maxium Distance (cm) 0.7   Wound Assessment Granulation tissue   Drainage Amount Moderate   Drainage Description Serosanguinous   Odor None   Wound Thickness Description not for Pressure Injury Full thickness   Wound 07/15/22 Axilla Lateral;Left   Date First Assessed/Time First Assessed: 07/15/22 0834   Present on Hospital Admission: Yes  Primary Wound Type: Surgical Type  Location: Axilla  Wound Location Orientation: Lateral;Left   Wound Image    Wound Etiology Surgical   Dressing Status Clean;Dry; Intact   Wound Cleansed Cleansed with saline   Wound Length (cm) 0 cm   Wound Width (cm) 0 cm   Wound Depth (cm) 0 cm   Wound Surface Area (cm^2) 0 cm^2   Change in Wound Size % (l*w) 100   Wound Volume (cm^3) 0 cm^3   Wound Healing % 100   Wound Assessment Epithelialization   Drainage Amount None   Odor None   Pain Assessment   Pain Assessment 0-10   Pain Level 5   Patient's Stated Pain Goal 0 - No pain   Pain Location Chest   Pain Orientation Left   Pain Descriptors Throbbing

## 2022-07-27 ENCOUNTER — HOSPITAL ENCOUNTER (OUTPATIENT)
Dept: WOUND CARE | Age: 62
Setting detail: RECURRING SERIES
Discharge: HOME | End: 2022-07-27

## 2022-07-27 VITALS
HEART RATE: 63 BPM | RESPIRATION RATE: 16 BRPM | TEMPERATURE: 98.4 F | SYSTOLIC BLOOD PRESSURE: 118 MMHG | DIASTOLIC BLOOD PRESSURE: 66 MMHG

## 2022-07-27 DIAGNOSIS — L03.90 MRSA CELLULITIS: ICD-10-CM

## 2022-07-27 DIAGNOSIS — L03.115 CELLULITIS OF RIGHT FOOT DUE TO METHICILLIN-RESISTANT STAPHYLOCOCCUS AUREUS: ICD-10-CM

## 2022-07-27 DIAGNOSIS — B95.62 MRSA CELLULITIS: ICD-10-CM

## 2022-07-27 DIAGNOSIS — S41.102A OPEN WOUND OF LEFT AXILLARY REGION, INITIAL ENCOUNTER: Primary | ICD-10-CM

## 2022-07-27 DIAGNOSIS — S21.002A OPEN WOUND OF LEFT BREAST, INITIAL ENCOUNTER: ICD-10-CM

## 2022-07-27 DIAGNOSIS — B95.62 CELLULITIS OF RIGHT FOOT DUE TO METHICILLIN-RESISTANT STAPHYLOCOCCUS AUREUS: ICD-10-CM

## 2022-07-27 DIAGNOSIS — L03.111 CELLULITIS OF AXILLA, RIGHT: ICD-10-CM

## 2022-07-27 DIAGNOSIS — N61.0 CELLULITIS OF LEFT BREAST: ICD-10-CM

## 2022-07-27 PROCEDURE — 99212 OFFICE O/P EST SF 10 MIN: CPT

## 2022-07-27 RX ORDER — CLOBETASOL PROPIONATE 0.5 MG/G
OINTMENT TOPICAL ONCE
Status: CANCELLED | OUTPATIENT
Start: 2022-07-27 | End: 2022-07-27

## 2022-07-27 RX ORDER — BETAMETHASONE DIPROPIONATE 0.05 %
OINTMENT (GRAM) TOPICAL ONCE
Status: CANCELLED | OUTPATIENT
Start: 2022-07-27 | End: 2022-07-27

## 2022-07-27 RX ORDER — LIDOCAINE HYDROCHLORIDE 20 MG/ML
JELLY TOPICAL ONCE
Status: CANCELLED | OUTPATIENT
Start: 2022-07-27 | End: 2022-07-27

## 2022-07-27 RX ORDER — GENTAMICIN SULFATE 1 MG/G
OINTMENT TOPICAL ONCE
Status: CANCELLED | OUTPATIENT
Start: 2022-07-27 | End: 2022-07-27

## 2022-07-27 RX ORDER — LIDOCAINE 50 MG/G
OINTMENT TOPICAL ONCE
Status: CANCELLED | OUTPATIENT
Start: 2022-07-27 | End: 2022-07-27

## 2022-07-27 RX ORDER — LIDOCAINE HYDROCHLORIDE 40 MG/ML
SOLUTION TOPICAL ONCE
Status: CANCELLED | OUTPATIENT
Start: 2022-07-27 | End: 2022-07-27

## 2022-07-27 RX ORDER — LIDOCAINE 40 MG/G
CREAM TOPICAL ONCE
Status: CANCELLED | OUTPATIENT
Start: 2022-07-27 | End: 2022-07-27

## 2022-07-27 RX ORDER — GINSENG 100 MG
CAPSULE ORAL ONCE
Status: CANCELLED | OUTPATIENT
Start: 2022-07-27 | End: 2022-07-27

## 2022-07-27 RX ORDER — BACITRACIN, NEOMYCIN, POLYMYXIN B 400; 3.5; 5 [USP'U]/G; MG/G; [USP'U]/G
OINTMENT TOPICAL ONCE
Status: CANCELLED | OUTPATIENT
Start: 2022-07-27 | End: 2022-07-27

## 2022-07-27 RX ORDER — BACITRACIN ZINC AND POLYMYXIN B SULFATE 500; 1000 [USP'U]/G; [USP'U]/G
OINTMENT TOPICAL ONCE
Status: CANCELLED | OUTPATIENT
Start: 2022-07-27 | End: 2022-07-27

## 2022-07-27 ASSESSMENT — PAIN DESCRIPTION - LOCATION: LOCATION: CHEST

## 2022-07-27 ASSESSMENT — PAIN DESCRIPTION - ORIENTATION: ORIENTATION: LEFT

## 2022-07-27 ASSESSMENT — PAIN SCALES - GENERAL: PAINLEVEL_OUTOF10: 3

## 2022-07-27 ASSESSMENT — PAIN DESCRIPTION - DESCRIPTORS: DESCRIPTORS: SORE

## 2022-07-27 NOTE — FLOWSHEET NOTE
07/27/22 0912   Wound 07/15/22 Chest Lateral;Left I&D on 7/1/22 in ER for abscess   Date First Assessed/Time First Assessed: 07/15/22 0824   Present on Hospital Admission: Yes  Primary Wound Type: Surgical Type  Location: Chest  Wound Location Orientation: Lateral;Left  Wound Description (Comments): I&D on 7/1/22 in ER for abscess   Wound Image    Wound Etiology Surgical   Dressing Status Old drainage noted   Wound Cleansed Cleansed with saline   Dressing/Treatment Iodoform gauze   Wound Length (cm) 2 cm   Wound Width (cm) 0.7 cm   Wound Depth (cm) 0.9 cm   Wound Surface Area (cm^2) 1.4 cm^2   Change in Wound Size % (l*w) 50   Wound Volume (cm^3) 1.26 cm^3   Wound Healing % 68   Wound Assessment Granulation tissue   Drainage Amount Moderate   Drainage Description Serosanguinous   Odor None   Wound Thickness Description not for Pressure Injury Full thickness

## 2022-07-29 ENCOUNTER — HOSPITAL ENCOUNTER (OUTPATIENT)
Dept: WOUND CARE | Age: 62
Setting detail: RECURRING SERIES
Discharge: HOME | End: 2022-07-29

## 2022-07-29 VITALS
HEART RATE: 81 BPM | TEMPERATURE: 98.3 F | DIASTOLIC BLOOD PRESSURE: 71 MMHG | BODY MASS INDEX: 21.95 KG/M2 | WEIGHT: 136 LBS | RESPIRATION RATE: 18 BRPM | SYSTOLIC BLOOD PRESSURE: 125 MMHG

## 2022-07-29 DIAGNOSIS — L03.115 CELLULITIS OF RIGHT FOOT DUE TO METHICILLIN-RESISTANT STAPHYLOCOCCUS AUREUS: ICD-10-CM

## 2022-07-29 DIAGNOSIS — B95.62 CELLULITIS OF RIGHT FOOT DUE TO METHICILLIN-RESISTANT STAPHYLOCOCCUS AUREUS: ICD-10-CM

## 2022-07-29 DIAGNOSIS — S21.002A OPEN WOUND OF LEFT BREAST, INITIAL ENCOUNTER: ICD-10-CM

## 2022-07-29 DIAGNOSIS — L03.111 CELLULITIS OF AXILLA, RIGHT: ICD-10-CM

## 2022-07-29 DIAGNOSIS — S41.102A OPEN WOUND OF LEFT AXILLARY REGION, INITIAL ENCOUNTER: ICD-10-CM

## 2022-07-29 DIAGNOSIS — L03.90 MRSA CELLULITIS: ICD-10-CM

## 2022-07-29 DIAGNOSIS — B95.62 MRSA CELLULITIS: ICD-10-CM

## 2022-07-29 DIAGNOSIS — N61.0 CELLULITIS OF LEFT BREAST: ICD-10-CM

## 2022-07-29 DIAGNOSIS — S41.102D OPEN WOUND OF LEFT AXILLARY REGION, SUBSEQUENT ENCOUNTER: Primary | ICD-10-CM

## 2022-07-29 DIAGNOSIS — S21.002D OPEN WOUND OF LEFT BREAST, SUBSEQUENT ENCOUNTER: ICD-10-CM

## 2022-07-29 PROCEDURE — 99214 OFFICE O/P EST MOD 30 MIN: CPT | Performed by: SURGERY

## 2022-07-29 PROCEDURE — 99213 OFFICE O/P EST LOW 20 MIN: CPT

## 2022-07-29 RX ORDER — CLOBETASOL PROPIONATE 0.5 MG/G
OINTMENT TOPICAL ONCE
Status: CANCELLED | OUTPATIENT
Start: 2022-07-29 | End: 2022-07-29

## 2022-07-29 RX ORDER — LIDOCAINE HYDROCHLORIDE 20 MG/ML
JELLY TOPICAL ONCE
Status: CANCELLED | OUTPATIENT
Start: 2022-07-29 | End: 2022-07-29

## 2022-07-29 RX ORDER — BETAMETHASONE DIPROPIONATE 0.05 %
OINTMENT (GRAM) TOPICAL ONCE
Status: CANCELLED | OUTPATIENT
Start: 2022-07-29 | End: 2022-07-29

## 2022-07-29 RX ORDER — BACITRACIN, NEOMYCIN, POLYMYXIN B 400; 3.5; 5 [USP'U]/G; MG/G; [USP'U]/G
OINTMENT TOPICAL ONCE
Status: CANCELLED | OUTPATIENT
Start: 2022-07-29 | End: 2022-07-29

## 2022-07-29 RX ORDER — LIDOCAINE 50 MG/G
OINTMENT TOPICAL ONCE
Status: CANCELLED | OUTPATIENT
Start: 2022-07-29 | End: 2022-07-29

## 2022-07-29 RX ORDER — DOXYCYCLINE 100 MG/1
100 TABLET ORAL 2 TIMES DAILY
Qty: 28 TABLET | Refills: 0 | Status: SHIPPED | OUTPATIENT
Start: 2022-07-29 | End: 2022-08-12

## 2022-07-29 RX ORDER — LINEZOLID 600 MG/1
600 TABLET, FILM COATED ORAL 2 TIMES DAILY
Qty: 28 TABLET | Refills: 0 | Status: SHIPPED | OUTPATIENT
Start: 2022-07-29 | End: 2022-07-29 | Stop reason: HOSPADM

## 2022-07-29 RX ORDER — LINEZOLID 600 MG/1
600 TABLET, FILM COATED ORAL 2 TIMES DAILY
Qty: 28 TABLET | Refills: 0 | Status: SHIPPED | OUTPATIENT
Start: 2022-07-29 | End: 2022-08-12

## 2022-07-29 RX ORDER — LIDOCAINE HYDROCHLORIDE 40 MG/ML
SOLUTION TOPICAL ONCE
Status: CANCELLED | OUTPATIENT
Start: 2022-07-29 | End: 2022-07-29

## 2022-07-29 RX ORDER — LIDOCAINE 40 MG/G
CREAM TOPICAL ONCE
Status: CANCELLED | OUTPATIENT
Start: 2022-07-29 | End: 2022-07-29

## 2022-07-29 RX ORDER — BACITRACIN ZINC AND POLYMYXIN B SULFATE 500; 1000 [USP'U]/G; [USP'U]/G
OINTMENT TOPICAL ONCE
Status: CANCELLED | OUTPATIENT
Start: 2022-07-29 | End: 2022-07-29

## 2022-07-29 RX ORDER — GINSENG 100 MG
CAPSULE ORAL ONCE
Status: CANCELLED | OUTPATIENT
Start: 2022-07-29 | End: 2022-07-29

## 2022-07-29 RX ORDER — GENTAMICIN SULFATE 1 MG/G
OINTMENT TOPICAL ONCE
Status: CANCELLED | OUTPATIENT
Start: 2022-07-29 | End: 2022-07-29

## 2022-07-29 NOTE — WOUND CARE
Discharge Instructions for  Radha Harrell  [address] [facility] [address]  [address], [address]  Phone [phone]   Fax [phone]      NAME:  Arias Amin  YOB: 1960  MEDICAL RECORD NUMBER:  [id number]  DATE:  7/29/2022    Return Appointment:   1 week with Cristy Iverson MD      Instructions: Left chest  Clean wounds with saline. Pack chest wound with 1/4 inch iodoform gauze or the corner of one piece of slightly moistened gauze. Cover with bandage and change daily. May change in the wound center on Monday and Wednesday if needed. Prescription sent to your pharmacy for zyvox. If the price is too high, then take doxy instead. Increase protein intake as this greatly facilitates wound healing. Should you experience increased redness, swelling, pain, foul odor, size of wound(s), or have a temperature over 101 degrees please contact the [address] [place] [address] Road at [phone] or if after hours contact your primary care physician or go to the hospital emergency department. PLEASE NOTE: IF YOU ARE UNABLE TO OBTAIN WOUND SUPPLIES, CONTINUE TO USE THE SUPPLIES YOU HAVE AVAILABLE UNTIL YOU ARE ABLE TO REACH US. IT IS MOST IMPORTANT TO KEEP THE WOUND COVERED AT ALL TIMES.     Electronically signed Moreno Chandler RN on 7/29/2022 at 10:43 AM

## 2022-07-29 NOTE — FLOWSHEET NOTE
07/29/22 1022   Wound 07/15/22 Chest Lateral;Left I&D on 7/1/22 in ER for abscess   Date First Assessed/Time First Assessed: 07/15/22 0824   Present on Hospital Admission: Yes  Primary Wound Type: Surgical Type  Location: Chest  Wound Location Orientation: Lateral;Left  Wound Description (Comments): I&D on 7/1/22 in ER for abscess   Wound Image    Wound Etiology Surgical   Dressing Status Old drainage noted   Wound Cleansed Cleansed with saline   Dressing/Treatment Iodoform gauze   Wound Length (cm) 1.6 cm   Wound Width (cm) 0.7 cm   Wound Depth (cm) 0.9 cm   Wound Surface Area (cm^2) 1.12 cm^2   Change in Wound Size % (l*w) 60   Wound Volume (cm^3) 1.008 cm^3   Wound Healing % 74   Wound Assessment Granulation tissue   Drainage Amount Moderate   Drainage Description Serosanguinous   Odor None   Wound Thickness Description not for Pressure Injury Full thickness

## 2022-07-29 NOTE — DISCHARGE INSTRUCTIONS
Discharge Instructions for  Radha Harrell  [address] [facility] [address]  [address], [address]  Phone [phone]   Fax [phone]      NAME:  Deniz Snyder  YOB: 1960  MEDICAL RECORD NUMBER:  [id number]  DATE:  @ED@    Return Appointment:   1 week with Nisreen Bustillo MD    Monday and Wednesday for dressing change    Instructions: Left chest  Clean wounds with saline. Pack chest wound with 1/4 inch iodoform gauze or the corner of one piece of slightly moistened gauze. Cover with bandage and change daily. May change in the wound center on Monday and Wednesday if needed. Prescription sent to your pharmacy for zyvox. If the price is too high, then take doxy instead. Increase protein intake as this greatly facilitates wound healing. Should you experience increased redness, swelling, pain, foul odor, size of wound(s), or have a temperature over 101 degrees please contact the [address] [place] [address] Road at [phone] or if after hours contact your primary care physician or go to the hospital emergency department. PLEASE NOTE: IF YOU ARE UNABLE TO OBTAIN WOUND SUPPLIES, CONTINUE TO USE THE SUPPLIES YOU HAVE AVAILABLE UNTIL YOU ARE ABLE TO REACH US. IT IS MOST IMPORTANT TO KEEP THE WOUND COVERED AT ALL TIMES.     Electronically signed Tiffanie Valenzuela RN on 7/29/2022 at 11:01 AM

## 2022-07-29 NOTE — PROGRESS NOTES
[address]  Consult Note/H&P/Progress Note      Ry Adkins RECORD NUMBER:  [id number]  AGE: 62 y.o. RACE White (non-)  GENDER: female  : 1960  EPISODE DATE:  2022       Subjective:      CC (Chief Complaint) and HISTORY of PRESENT ILLNESS (HPI):    Kenzie Carmen is a 62 y.o. female who presents today for wound/ulcer evaluation. She saw me for her first visit at the wound center on 7/15/22. She was admitted by the hospitalists on 22 from the ER with bilateral breast cellulitis. She was s/p I&D of left axillary abscess done at an urgent treatment center in late 2022. She was placed on doxycycline but her infection progressed and she came to the ER where she was admitted by the hospitalist for IV antibiotics for progressive left breast and left cellulitis. She had an I&D performed by Dr. Long Bhardwaj on 7/3/2022 for MRSA left outer breast/ left axillary abscess  She has 2 surgical incisions with open wounds on the left side  No recent surgery on the right side  She was discharged on Bactrim and iodoform packing. She is not up-to-date with her mammograms as of 22. This information was obtained from the patient  The following HPI elements were documented for the patient's wound:  Location: Left breast wound  Duration: Since 7/3/2022  Severity: Moderate severity; grew MRSA  Context:    Modifying Factors:  Nothing makes better or worse  Quality:    Timing:     Associated Signs and Symptoms: No fevers      Ulcer Identification:  Ulcer Type: Infectious  Contributing Factors: No tobacco and no history of diabetes         22 CT chest  Hx: Left-sided breast pain. Bilateral axillary abscesses status post I and D 2 days prior. COMPARISON: Same day chest radiograph. Multiple axial images were obtained through the chest after intravenous injection of 80 mL of Isovue 370.   Radiation dose reduction techniques were used for this study: All CT scans performed at this facility use one or all of the following: Automated exposure control, adjustment of the mA and/or kVp according to patient's size, iterative reconstruction. FINDINGS: LUNGS/PLEURA: The central airways are patent. The lungs are within normal limits. No suspicious pulmonary nodules. No pleural effusion or pneumothorax. No focal pleural lesion. MEDIASTINUM: The thoracic aorta and main pulmonary artery are within normal limits. The heart is normal in size. No pericardial effusion. No suspicious thyroid nodule. The esophagus is normal. No pathologic adenopathy. BONES AND SOFT TISSUES: Bilateral axillary subcutaneous soft tissue fat stranding and small pockets of air consistent with recent bilateral axillary incision and drainages. There appears to be a small drain of the left axilla. There is soft tissue thickening without a clear organized fluid collection of the left or right axilla. There is asymmetric diffuse trabecular thickening and skin thickening with edematous change of the left breast, query mastitis. UPPER ABDOMEN: Unremarkable. Bilateral axillary subcutaneous soft tissue fat stranding and small pockets of air consistent with recent bilateral axillary incision and drainages. There appears to be a small drain of the left axilla. There is soft tissue thickening without a clear organized fluid collection of the left or right axilla. There is asymmetric diffuse trabecular thickening and skin thickening with edematous change of the left breast, query mastitis. Patient should be evaluated in the breast center if not already seen preferably a few days after antibiotic therapy within the next 2-3 weeks to ensure no evidence of inflammatory breast cancer              PAST MEDICAL HISTORY  No past medical history on file.      PAST SURGICAL HISTORY  Past Surgical History:   Procedure Laterality Date    BREAST SURGERY Left 7/3/2022    BREAST INCISION AND DRAINAGE LEFT performed by Evan Thorne MD at [address] Left 2009       FAMILY HISTORY  No family history on file. SOCIAL HISTORY  Social History     Tobacco Use    Smoking status: Never    Smokeless tobacco: Never   Vaping Use    Vaping Use: Never used   Substance Use Topics    Alcohol use: Yes     Alcohol/week: 1.0 standard drink     Types: 1 Shots of liquor per week     Comment: Every 3 weeks    Drug use: Never       ALLERGIES  Allergies   Allergen Reactions    Bactrim [Sulfamethoxazole-Trimethoprim] Rash       MEDICATIONS  No current outpatient medications on file prior to encounter. No current facility-administered medications on file prior to encounter. REVIEW OF SYSTEMS  The patient has no difficulty with chest pain or shortness of breath. No fever or chills. Comprehensive review of systems was otherwise unremarkable except as noted above. Objective:   Physical Exam:   Recent vitals (if inpt):  Patient Vitals for the past 24 hrs:   BP Temp Temp src Pulse Resp Weight   07/29/22 1027 125/71 98.3 °F (36.8 °C) Temporal 81 18 --   07/29/22 1015 -- -- -- -- -- 136 lb (61.7 kg)         /71   Pulse 81   Temp 98.3 °F (36.8 °C) (Temporal)   Resp 18   Wt 136 lb (61.7 kg)   BMI 21.95 kg/m²   Wt Readings from Last 3 Encounters:   07/29/22 136 lb (61.7 kg)   07/21/22 137 lb 3.2 oz (62.2 kg)   07/16/22 134 lb (60.8 kg)     Constitutional: Alert, oriented, cooperative patient in no acute distress; appears stated age;  General appearance is within normal limits for wound care patient population. See the today's recorded vitals signs and constitutional data. Eyes: Pupils equal; Sclera are clear. EOMs intact; The eyes appear to track and move normally. The sclera are not injected. The conjunctive are clear. The eyelids are normal. There is no scleral icterus.    ENMT: There are no obvious external ear, nose, lip or mouth lesions. Nares normal; Neck: Overall contour of the neck is normal with no obvious neck masses. Gross hearing is within normal limits. No obvious neck masses  Resp:  Breathing is non-labored; normal rate and effort; no audible wheezing. No breast masses on either side. Left breast wound and left axillary wound as shown below    She has acute MRSA cellulitis and folliculitis of the RIGHT axillae  No axillary adenopathy        CV: RRR;  no JVD; No evidence of cyanosis of the upper extremities. The extremities are perfused without embolic sign, splinter hemorrhages, or petechia. GI: soft and non-distended without acute abnormality noted. Musculoskeletal: unremarkable with normal function. No embolic signs or cyanosis. Neuro:  Oriented; moves all 4; no focal deficits  Psychiatric: Judgement and insight are within normal limits for the wound care population of patients. Patient is oriented to person, place, and time. Recent and remote memory are within normal limits. Mood and affect are within normal limits. Integumentary (Skin/Wounds)  See inspection of wound(s) below. There are no other skin areas of palpable concern. See wound center documentation including photos in the [address] Wound Template made part of this record by reference.          Wound Care Documentation:    Wound 07/15/22 Chest Lateral;Left I&D on 7/1/22 in ER for abscess (Active)   Wound Image   07/29/22 1022   Wound Etiology Surgical 07/29/22 1022   Dressing Status Old drainage noted 07/29/22 1022   Wound Cleansed Cleansed with saline 07/29/22 1022   Dressing/Treatment Iodoform gauze 07/29/22 1022   Wound Length (cm) 1.6 cm 07/29/22 1022   Wound Width (cm) 0.7 cm 07/29/22 1022   Wound Depth (cm) 0.9 cm 07/29/22 1022   Wound Surface Area (cm^2) 1.12 cm^2 07/29/22 1022   Change in Wound Size % (l*w) 60 07/29/22 1022   Wound Volume (cm^3) 1.008 cm^3 07/29/22 1022   Wound Healing % 74 07/29/22 1022   Undermining Starts ___ images for studies I described above in the HPI or studies I have ordered. For this visit I also reviewed old records and prior notes. No results for input(s): WBC, HGB, PLT, NA, K, CL, CO2, BUN, CREA, GLU, INR, APTT, ALT, AML, AML, LCAD, PCO2, PO2, HCO3 in the last 72 hours. Invalid input(s): PTP, TBIL, TBILI, CBIL, SGOT, GPT, AP, LPSE, NH4, TROPT, TROIQ,  PH  No results for input(s): INR, APTT, ALT, AML in the last 72 hours. Invalid input(s): PTP, TBIL, TBILI, CBIL, SGOT, GPT, AP, LPSE    Most recent blood counts (CBC) review  Lab Results   Component Value Date    WBC 3.3 (L) 07/01/2022    HGB 10.8 (L) 07/01/2022    HCT 32.7 (L) 07/01/2022    MCV 86.5 07/01/2022     07/01/2022     Most recent chemistry (BMP) test review   Lab Results   Component Value Date/Time     07/02/2022 04:05 AM    K 3.7 07/02/2022 04:05 AM     07/02/2022 04:05 AM    CO2 27 07/02/2022 04:05 AM    BUN 14 07/02/2022 04:05 AM    CREATININE 0.60 07/02/2022 04:05 AM    GLUCOSE 82 07/02/2022 04:05 AM    CALCIUM 8.0 07/02/2022 04:05 AM      Most recent Liver enzyme test review  Lab Results   Component Value Date    ALT 31 07/02/2022    AST 43 (H) 07/02/2022    ALKPHOS 67 07/02/2022    BILITOT 0.4 07/02/2022     Most recent coagulation (coags) review  @lastinr@  No results found for: INR, APTT, AML, AML, LCAD    Diabetic assessment  No results found for: LABA1C  No results found for: EAG    Nutritional assessment screen to assess wound healing ability:  Lab Results   Component Value Date    LABALBU 2.3 (L) 07/02/2022     No results found for: TP, ALBR, ALB    Xray Result (most recent):  XR CHEST PORTABLE 07/01/2022    Narrative  EXAM: Single view chest radiograph. INDICATION: Left-sided breast pain. COMPARISON: None. FINDINGS:  No focal lung consolidation. No pneumothorax. No pleural effusion. The heart is  normal in size. No evidence of acute osseous abnormality. Impression  1.  No acute cardiopulmonary abnormality. CT Result (most recent):  CT CHEST W CONTRAST 07/01/2022    Narrative  EXAM: Chest CT with contrast.  INDICATION: Left-sided breast pain. Bilateral axillary abscesses status post I  and D 2 days prior. COMPARISON: Same day chest radiograph. Multiple axial images were obtained through the chest after intravenous  injection of 80 mL of Isovue 370. Radiation dose reduction techniques were used  for this study: All CT scans performed at this facility use one or all of the  following: Automated exposure control, adjustment of the mA and/or kVp according  to patient's size, iterative reconstruction. FINDINGS:  LUNGS/PLEURA:  The central airways are patent. The lungs are within normal limits. No  suspicious pulmonary nodules. No pleural effusion or pneumothorax. No focal  pleural lesion. MEDIASTINUM:  The thoracic aorta and main pulmonary artery are within normal limits. The heart  is normal in size. No pericardial effusion. No suspicious thyroid nodule. The  esophagus is normal. No pathologic adenopathy. BONES AND SOFT TISSUES:  Bilateral axillary subcutaneous soft tissue fat stranding and small pockets of  air consistent with recent bilateral axillary incision and drainages. There  appears to be a small drain of the left axilla. There is soft tissue thickening  without a clear organized fluid collection of the left or right axilla. There is  asymmetric diffuse trabecular thickening and skin thickening with edematous  change of the left breast, query mastitis. UPPER ABDOMEN:  Unremarkable. Impression  Bilateral axillary subcutaneous soft tissue fat stranding and small pockets of  air consistent with recent bilateral axillary incision and drainages. There  appears to be a small drain of the left axilla. There is soft tissue thickening  without a clear organized fluid collection of the left or right axilla.  There is  asymmetric diffuse trabecular thickening and skin thickening with edematous  change of the left breast, query mastitis. Patient should be evaluated in the  breast center if not already seen preferably a few days after antibiotic therapy  within the next 2-3 weeks to ensure no evidence of inflammatory breast cancer. Admission date (for inpatients): 7/29/2022   Day of Surgery  * No procedures listed *    Assessment:      Problem List Items Addressed This Visit          Other    Cellulitis of left breast    Relevant Orders    Initiate Outpatient Wound Care Protocol    Open wound of left axillary region - Primary    Relevant Orders    Initiate Outpatient Wound Care Protocol    MRSA right axillary cellulitis    Relevant Orders    Initiate Outpatient Wound Care Protocol    Open wound of left breast    Relevant Orders    Initiate Outpatient Wound Care Protocol    Cellulitis of axilla, right    Relevant Orders    Initiate Outpatient Wound Care Protocol    MRSA cellulitis    Relevant Orders    Initiate Outpatient Wound Care Protocol         [unfilled]    Active Problems:    Cellulitis of left breast    Open wound of left axillary region    MRSA right axillary cellulitis    Open wound of left breast    Cellulitis of axilla, right  Resolved Problems:    * No resolved hospital problems.  *      Patient Active Problem List    Diagnosis Date Noted    Open wound of left axillary region 07/15/2022     Priority: Medium    MRSA right axillary cellulitis 07/15/2022     Priority: Medium    Open wound of left breast 07/15/2022     Priority: Medium    Cellulitis of axilla, right 07/15/2022     Priority: Medium    MRSA cellulitis 07/15/2022     Priority: Medium    Cellulitis of left breast 07/01/2022     Priority: Medium           Plan:     Number and Complexity of Problems addressed and   Risks of complications and/or morbidity of management    Treatment Note please see attached Discharge Instructions  Any procedures done today in the wound center (if documented in a separate note) in [facility] antimicrobial mesh and/or antimicrobial alginate/hydrofiber, or antimicrobial solution moistened gauze/kerlex, or equivalent and cover with secondary dressing/foam    Compression Management needed for edema control, apply multilayer compression or tubular garment or equivalent    Offloading Management needed for pressure relief, apply offloading shoe/boot or equivalent     Standing Status:   Standing     Number of Occurrences:   1         [unfilled]            Level of MDM (2/3 elements below)  Number and Complexity of Problems Addressed Amount and/or Complexity of Data to be Reviewed and Analyzed  *Each unique test, order, or document contributes to the combination of 2 or combination of 3 in Category 1 below. Risk of Complications and/or Morbidity or Mortality of pt Management     76327  85303 SF Minimal  ?1self-limited or minor problem Minimal or none Minimal risk of morbidity from additional diagnostic testing or Rx   83259  96140 Low Low  ? 2or more self-limited or minor problems;    or  ? 1stable chronic illness;    or  ?1acute, uncomplicated illness or injury   Limited  (Must meet the requirements of at least 1 of the 2 categories)  Category 1: Tests and documents   ? Any combination of 2 from the following:  ?Review of prior external note(s) from each unique source*;  ?review of the result(s) of each unique test*;   ?ordering of each unique test*    or   Category 2: Assessment requiring an independent historian(s)  (For the categories of independent interpretation of tests and discussion of management or test interpretation, see moderate or high) Low risk of morbidity from additional diagnostic testing or treatment     21666  86398 Mod Moderate  ? 1or more chronic illnesses with exacerbation, progression, or side effects of treatment;    or  ?2or more stable chronic illnesses     or  ? 1undiagnosed new problem with uncertain prognosis--recent left breast abscess with no history of mammograms.   It is uncertain if she has breast neoplasm or pathology. She will need mammogram after the acute infection has resolved to clear the uncertainty. 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 ;    or  ?1acute illness with systemic symptoms;    or  ?1acute complicated injury   Moderate  (Must meet the requirements of at least 1 out of 3 categories)  Category 1: Tests, documents, or independent historian(s)  ? Any combination of 3 from the following:   ?Review of prior external note(s) from each unique source*;  ?Review of the result(s) of each unique test*;  ?Ordering of each unique test*;  ?Assessment requiring an independent historian(s)    or  Category 2: Independent interpretation of tests   ? Independent interpretation of a test performed by another physician/other qualified health care professional (not separately reported);     or  Category 3: Discussion of management or test interpretation  ? Discussion of management or test interpretation with external physician/other qualified health care professional/appropriate source (not separately reported)   Moderate risk of morbidity from additional diagnostic testing or treatment  Examples only:  ?Prescription drug management - advanced wound care prescription Rx wound care products--doxycyline and iodoform  (eg Iodosorb, hydrofera, silvadene, collagen, puracol plus, optiloc, biologics - placenta, Theraskin, Apligraf). Note also that if home health care is involved, they will not apply topical therapies without a prescription/order from physician      ? Decision regarding minor surgery with identified patient or procedure risk factors  ? Decision regarding elective major surgery without identified patient or procedure risk factors   ? Diagnosis or treatment significantly limited by social determinants of health       [id number] High High  ? 1or more chronic illnesses with severe exacerbation, progression, or side effects of treatment;    or  ?1 acute or chronic illness or injury that poses a threat to life or bodily function   Extensive  (Must meet the requirements of at least 2 out of 3 categories)  Category 1: Tests, documents, or independent historian(s)  ? Any combination of 3 from the following:   ?Review of prior external note(s) from each unique source*;  ?Review of the result(s) of each unique test*;   ?Ordering of each unique test*;   ?Assessment requiring an independent historian(s)    or   Category 2: Independent interpretation of tests   ? Independent interpretation of a test performed by another physician/other qualified health care professional (not separately reported);     or  Category 3: Discussion of management or test interpretation  ? Discussion of management or test interpretation with external physician/other qualified health care professional/appropriate source (not separately reported)   High risk of morbidity from additional diagnostic testing or treatment  Examples only:  ?Drug therapy requiring intensive monitoring for toxicity  ? Decision regarding elective major surgery with identified patient or procedure risk factors  ? Decision regarding emergency major surgery  ? Decision regarding hospitalization  ? Decision not to resuscitate or to de-escalate care because of poor prognosis                 I have personally performed a face-to-face diagnostic evaluation and management  service on this patient. I have independently seen the patient. I have independently obtained the above history from the patient/family. I have independently examined the patient with above findings. I have independently reviewed data/labs for this patient and developed the above plan of care (MDM).       Electronically signed by Claudia Lyons MD on 7/29/2022 at 10:29 AM

## 2022-08-01 ENCOUNTER — HOSPITAL ENCOUNTER (OUTPATIENT)
Dept: WOUND CARE | Age: 62
Discharge: HOME | End: 2022-08-01

## 2022-08-01 VITALS
WEIGHT: 136 LBS | DIASTOLIC BLOOD PRESSURE: 76 MMHG | SYSTOLIC BLOOD PRESSURE: 122 MMHG | HEIGHT: 66 IN | TEMPERATURE: 97 F | HEART RATE: 81 BPM | BODY MASS INDEX: 21.86 KG/M2 | RESPIRATION RATE: 18 BRPM

## 2022-08-01 DIAGNOSIS — S41.102D OPEN WOUND OF LEFT AXILLARY REGION, SUBSEQUENT ENCOUNTER: Primary | ICD-10-CM

## 2022-08-01 DIAGNOSIS — B95.62 MRSA CELLULITIS: ICD-10-CM

## 2022-08-01 DIAGNOSIS — L03.115 CELLULITIS OF RIGHT FOOT DUE TO METHICILLIN-RESISTANT STAPHYLOCOCCUS AUREUS: ICD-10-CM

## 2022-08-01 DIAGNOSIS — N61.0 CELLULITIS OF LEFT BREAST: ICD-10-CM

## 2022-08-01 DIAGNOSIS — B95.62 CELLULITIS OF RIGHT FOOT DUE TO METHICILLIN-RESISTANT STAPHYLOCOCCUS AUREUS: ICD-10-CM

## 2022-08-01 DIAGNOSIS — S21.002D OPEN WOUND OF LEFT BREAST, SUBSEQUENT ENCOUNTER: ICD-10-CM

## 2022-08-01 DIAGNOSIS — L03.111 CELLULITIS OF AXILLA, RIGHT: ICD-10-CM

## 2022-08-01 DIAGNOSIS — L03.90 MRSA CELLULITIS: ICD-10-CM

## 2022-08-01 PROCEDURE — 99213 OFFICE O/P EST LOW 20 MIN: CPT

## 2022-08-01 NOTE — FLOWSHEET NOTE
08/01/22 1524   Wound 07/15/22 Chest Lateral;Left I&D on 7/1/22 in ER for abscess   Date First Assessed/Time First Assessed: 07/15/22 0824   Present on Hospital Admission: Yes  Primary Wound Type: Surgical Type  Location: Chest  Wound Location Orientation: Lateral;Left  Wound Description (Comments): I&D on 7/1/22 in ER for abscess   Wound Image    Wound Etiology Surgical   Dressing Status Intact   Wound Cleansed Cleansed with saline   Dressing/Treatment Iodoform gauze   Wound Length (cm) 2 cm   Wound Width (cm) 0.7 cm   Wound Depth (cm) 0.7 cm   Wound Surface Area (cm^2) 1.4 cm^2   Change in Wound Size % (l*w) 50   Wound Volume (cm^3) 0.98 cm^3   Wound Healing % 75   Wound Assessment Granulation tissue   Drainage Amount Moderate   Drainage Description Serosanguinous   Odor None   [place]-wound Assessment Induration   Wound Thickness Description not for Pressure Injury Full thickness

## 2022-08-03 ENCOUNTER — HOSPITAL ENCOUNTER (OUTPATIENT)
Dept: WOUND CARE | Age: 62
Setting detail: RECURRING SERIES
Discharge: HOME | End: 2022-08-03

## 2022-08-03 VITALS
BODY MASS INDEX: 21.44 KG/M2 | HEART RATE: 87 BPM | SYSTOLIC BLOOD PRESSURE: 109 MMHG | RESPIRATION RATE: 18 BRPM | DIASTOLIC BLOOD PRESSURE: 75 MMHG | TEMPERATURE: 97.7 F | WEIGHT: 133.4 LBS | HEIGHT: 66 IN

## 2022-08-03 DIAGNOSIS — S21.002D OPEN WOUND OF LEFT BREAST, SUBSEQUENT ENCOUNTER: ICD-10-CM

## 2022-08-03 DIAGNOSIS — S41.102D OPEN WOUND OF LEFT AXILLARY REGION, SUBSEQUENT ENCOUNTER: Primary | ICD-10-CM

## 2022-08-03 DIAGNOSIS — B95.62 CELLULITIS OF RIGHT FOOT DUE TO METHICILLIN-RESISTANT STAPHYLOCOCCUS AUREUS: ICD-10-CM

## 2022-08-03 DIAGNOSIS — N61.0 CELLULITIS OF LEFT BREAST: ICD-10-CM

## 2022-08-03 DIAGNOSIS — L03.90 MRSA CELLULITIS: ICD-10-CM

## 2022-08-03 DIAGNOSIS — L03.115 CELLULITIS OF RIGHT FOOT DUE TO METHICILLIN-RESISTANT STAPHYLOCOCCUS AUREUS: ICD-10-CM

## 2022-08-03 DIAGNOSIS — L03.111 CELLULITIS OF AXILLA, RIGHT: ICD-10-CM

## 2022-08-03 DIAGNOSIS — S41.102A OPEN WOUND OF LEFT AXILLARY REGION, INITIAL ENCOUNTER: ICD-10-CM

## 2022-08-03 DIAGNOSIS — B95.62 MRSA CELLULITIS: ICD-10-CM

## 2022-08-03 DIAGNOSIS — S21.002A OPEN WOUND OF LEFT BREAST, INITIAL ENCOUNTER: ICD-10-CM

## 2022-08-03 PROCEDURE — 99213 OFFICE O/P EST LOW 20 MIN: CPT

## 2022-08-03 RX ORDER — LIDOCAINE 40 MG/G
CREAM TOPICAL ONCE
Status: CANCELLED | OUTPATIENT
Start: 2022-08-03 | End: 2022-08-03

## 2022-08-03 RX ORDER — CLOBETASOL PROPIONATE 0.5 MG/G
OINTMENT TOPICAL ONCE
Status: CANCELLED | OUTPATIENT
Start: 2022-08-03 | End: 2022-08-03

## 2022-08-03 RX ORDER — BETAMETHASONE DIPROPIONATE 0.05 %
OINTMENT (GRAM) TOPICAL ONCE
Status: CANCELLED | OUTPATIENT
Start: 2022-08-03 | End: 2022-08-03

## 2022-08-03 RX ORDER — GENTAMICIN SULFATE 1 MG/G
OINTMENT TOPICAL ONCE
Status: CANCELLED | OUTPATIENT
Start: 2022-08-03 | End: 2022-08-03

## 2022-08-03 RX ORDER — LIDOCAINE HYDROCHLORIDE 20 MG/ML
JELLY TOPICAL ONCE
Status: CANCELLED | OUTPATIENT
Start: 2022-08-03 | End: 2022-08-03

## 2022-08-03 RX ORDER — LIDOCAINE HYDROCHLORIDE 40 MG/ML
SOLUTION TOPICAL ONCE
Status: CANCELLED | OUTPATIENT
Start: 2022-08-03 | End: 2022-08-03

## 2022-08-03 RX ORDER — GINSENG 100 MG
CAPSULE ORAL ONCE
Status: CANCELLED | OUTPATIENT
Start: 2022-08-03 | End: 2022-08-03

## 2022-08-03 RX ORDER — BACITRACIN ZINC AND POLYMYXIN B SULFATE 500; 1000 [USP'U]/G; [USP'U]/G
OINTMENT TOPICAL ONCE
Status: CANCELLED | OUTPATIENT
Start: 2022-08-03 | End: 2022-08-03

## 2022-08-03 RX ORDER — LIDOCAINE 50 MG/G
OINTMENT TOPICAL ONCE
Status: CANCELLED | OUTPATIENT
Start: 2022-08-03 | End: 2022-08-03

## 2022-08-03 RX ORDER — BACITRACIN, NEOMYCIN, POLYMYXIN B 400; 3.5; 5 [USP'U]/G; MG/G; [USP'U]/G
OINTMENT TOPICAL ONCE
Status: CANCELLED | OUTPATIENT
Start: 2022-08-03 | End: 2022-08-03

## 2022-08-03 ASSESSMENT — PAIN SCALES - GENERAL: PAINLEVEL_OUTOF10: 3

## 2022-08-03 ASSESSMENT — PAIN DESCRIPTION - ORIENTATION: ORIENTATION: LEFT

## 2022-08-03 ASSESSMENT — PAIN DESCRIPTION - LOCATION: LOCATION: BREAST

## 2022-08-03 ASSESSMENT — PAIN DESCRIPTION - DESCRIPTORS: DESCRIPTORS: DISCOMFORT

## 2022-08-03 NOTE — FLOWSHEET NOTE
08/03/22 1515   Wound 07/15/22 Chest Lateral;Left I&D on 7/1/22 in ER for abscess   Date First Assessed/Time First Assessed: 07/15/22 0824   Present on Hospital Admission: Yes  Primary Wound Type: Surgical Type  Location: Chest  Wound Location Orientation: Lateral;Left  Wound Description (Comments): I&D on 7/1/22 in ER for abscess   Wound Image    Wound Etiology Surgical   Dressing Status Intact   Wound Cleansed Cleansed with saline   Dressing/Treatment Iodoform gauze   Wound Length (cm) 1.9 cm   Wound Width (cm) 0.7 cm   Wound Depth (cm) 0.7 cm   Wound Surface Area (cm^2) 1.33 cm^2   Change in Wound Size % (l*w) 52.5   Wound Volume (cm^3) 0.931 cm^3   Wound Healing % 76   Wound Assessment Granulation tissue   Drainage Amount Moderate   Drainage Description Serosanguinous   Odor None   Dinah-wound Assessment Induration   Wound Thickness Description not for Pressure Injury Full thickness     Patient is not on ANTICOAGULANT THERAPY. Wound mechanically debrided with gauze and normal saline. Clinician visit only.

## 2022-08-03 NOTE — WOUND CARE
Patient forearms and upper back present with light pink scattered pruritic rash. Dr. Brooke Calvillo assessed for reaction to Doxycycline. Dr. Hiram Mccain advised patient:  1. Continue Doxycycline while continuing to monitor rash. 2. Take OTC Benadryl or apply OTC Benadryl cream if rash and itching persist.  3. Increase water intake  4. Avoid direct sunlight. 5. If rash worsens, discontinue Doxycycline. Patient returned understanding and is eager to follow doctor's instructions/orders.

## 2022-08-05 ENCOUNTER — HOSPITAL ENCOUNTER (OUTPATIENT)
Dept: WOUND CARE | Age: 62
Setting detail: RECURRING SERIES
Discharge: HOME | End: 2022-08-05

## 2022-08-05 VITALS
RESPIRATION RATE: 18 BRPM | HEIGHT: 66 IN | SYSTOLIC BLOOD PRESSURE: 106 MMHG | TEMPERATURE: 98.2 F | BODY MASS INDEX: 21.34 KG/M2 | DIASTOLIC BLOOD PRESSURE: 70 MMHG | WEIGHT: 132.8 LBS | HEART RATE: 80 BPM

## 2022-08-05 DIAGNOSIS — B95.62 CELLULITIS OF RIGHT FOOT DUE TO METHICILLIN-RESISTANT STAPHYLOCOCCUS AUREUS: ICD-10-CM

## 2022-08-05 DIAGNOSIS — L03.90 MRSA CELLULITIS: ICD-10-CM

## 2022-08-05 DIAGNOSIS — L03.111 CELLULITIS OF AXILLA, RIGHT: ICD-10-CM

## 2022-08-05 DIAGNOSIS — S21.002A OPEN WOUND OF LEFT BREAST, INITIAL ENCOUNTER: ICD-10-CM

## 2022-08-05 DIAGNOSIS — L03.115 CELLULITIS OF RIGHT FOOT DUE TO METHICILLIN-RESISTANT STAPHYLOCOCCUS AUREUS: ICD-10-CM

## 2022-08-05 DIAGNOSIS — B95.62 MRSA CELLULITIS: ICD-10-CM

## 2022-08-05 DIAGNOSIS — S41.102A OPEN WOUND OF LEFT AXILLARY REGION, INITIAL ENCOUNTER: ICD-10-CM

## 2022-08-05 DIAGNOSIS — S41.102D OPEN WOUND OF LEFT AXILLARY REGION, SUBSEQUENT ENCOUNTER: Primary | ICD-10-CM

## 2022-08-05 DIAGNOSIS — N61.0 CELLULITIS OF LEFT BREAST: ICD-10-CM

## 2022-08-05 PROCEDURE — 99213 OFFICE O/P EST LOW 20 MIN: CPT

## 2022-08-05 PROCEDURE — 99214 OFFICE O/P EST MOD 30 MIN: CPT | Performed by: SURGERY

## 2022-08-05 RX ORDER — BACITRACIN ZINC AND POLYMYXIN B SULFATE 500; 1000 [USP'U]/G; [USP'U]/G
OINTMENT TOPICAL ONCE
Status: CANCELLED | OUTPATIENT
Start: 2022-08-05 | End: 2022-08-05

## 2022-08-05 RX ORDER — LIDOCAINE HYDROCHLORIDE 40 MG/ML
SOLUTION TOPICAL ONCE
Status: CANCELLED | OUTPATIENT
Start: 2022-08-05 | End: 2022-08-05

## 2022-08-05 RX ORDER — CLOBETASOL PROPIONATE 0.5 MG/G
OINTMENT TOPICAL ONCE
Status: CANCELLED | OUTPATIENT
Start: 2022-08-05 | End: 2022-08-05

## 2022-08-05 RX ORDER — LIDOCAINE HYDROCHLORIDE 20 MG/ML
JELLY TOPICAL ONCE
Status: CANCELLED | OUTPATIENT
Start: 2022-08-05 | End: 2022-08-05

## 2022-08-05 RX ORDER — GINSENG 100 MG
CAPSULE ORAL ONCE
Status: CANCELLED | OUTPATIENT
Start: 2022-08-05 | End: 2022-08-05

## 2022-08-05 RX ORDER — LIDOCAINE 40 MG/G
CREAM TOPICAL ONCE
Status: CANCELLED | OUTPATIENT
Start: 2022-08-05 | End: 2022-08-05

## 2022-08-05 RX ORDER — BACITRACIN, NEOMYCIN, POLYMYXIN B 400; 3.5; 5 [USP'U]/G; MG/G; [USP'U]/G
OINTMENT TOPICAL ONCE
Status: CANCELLED | OUTPATIENT
Start: 2022-08-05 | End: 2022-08-05

## 2022-08-05 RX ORDER — BETAMETHASONE DIPROPIONATE 0.05 %
OINTMENT (GRAM) TOPICAL ONCE
Status: CANCELLED | OUTPATIENT
Start: 2022-08-05 | End: 2022-08-05

## 2022-08-05 RX ORDER — GENTAMICIN SULFATE 1 MG/G
OINTMENT TOPICAL ONCE
Status: CANCELLED | OUTPATIENT
Start: 2022-08-05 | End: 2022-08-05

## 2022-08-05 RX ORDER — LIDOCAINE 50 MG/G
OINTMENT TOPICAL ONCE
Status: CANCELLED | OUTPATIENT
Start: 2022-08-05 | End: 2022-08-05

## 2022-08-05 ASSESSMENT — PAIN DESCRIPTION - LOCATION: LOCATION: BREAST

## 2022-08-05 ASSESSMENT — PAIN SCALES - GENERAL: PAINLEVEL_OUTOF10: 2

## 2022-08-05 ASSESSMENT — PAIN DESCRIPTION - ORIENTATION: ORIENTATION: LEFT

## 2022-08-05 NOTE — PROGRESS NOTES
[address]  Consult Note/H&P/Progress Note      [address] RECORD NUMBER:  [id number]  AGE: 62 y.o. RACE White (non-)  GENDER: female  : 1960  EPISODE DATE:  2022       Subjective:      CC (Chief Complaint) and HISTORY of PRESENT ILLNESS (HPI):    Nate Samano is a 62 y.o. female who presents today for wound/ulcer evaluation. She saw me for her first visit at the wound center on 7/15/22. She was admitted by the hospitalists on 22 from the ER with bilateral breast cellulitis. She was s/p I&D of left axillary abscess done at an urgent treatment center in late 2022. She was placed on doxycycline but her infection progressed and she came to the ER where she was admitted by the hospitalist for IV antibiotics for progressive left breast and left cellulitis. She had an I&D performed by Dr. Colletta Peacemaker on 7/3/2022 for MRSA left outer breast/ left axillary abscess  She has 2 surgical incisions with open wounds on the left side  No recent surgery on the right side  She was discharged on Bactrim and iodoform packing. She is not up-to-date with her mammograms as of 22. This information was obtained from the patient  The following HPI elements were documented for the patient's wound:  Location: Left breast wound  Duration: Since 7/3/2022  Severity: Moderate severity; grew MRSA  Context:    Modifying Factors:  Nothing makes better or worse  Quality:    Timing:     Associated Signs and Symptoms: No fevers      Ulcer Identification:  Ulcer Type: Infectious  Contributing Factors: No tobacco and no history of diabetes         22 CT chest  Hx: Left-sided breast pain. Bilateral axillary abscesses status post I and D 2 days prior. COMPARISON: Same day chest radiograph. Multiple axial images were obtained through the chest after intravenous injection of 80 mL of Isovue 370.   Radiation dose reduction techniques were used for this study: All CT scans performed at this facility use one or all of the following: Automated exposure control, adjustment of the mA and/or kVp according to patient's size, iterative reconstruction. FINDINGS: LUNGS/PLEURA: The central airways are patent. The lungs are within normal limits. No suspicious pulmonary nodules. No pleural effusion or pneumothorax. No focal pleural lesion. MEDIASTINUM: The thoracic aorta and main pulmonary artery are within normal limits. The heart is normal in size. No pericardial effusion. No suspicious thyroid nodule. The esophagus is normal. No pathologic adenopathy. BONES AND SOFT TISSUES: Bilateral axillary subcutaneous soft tissue fat stranding and small pockets of air consistent with recent bilateral axillary incision and drainages. There appears to be a small drain of the left axilla. There is soft tissue thickening without a clear organized fluid collection of the left or right axilla. There is asymmetric diffuse trabecular thickening and skin thickening with edematous change of the left breast, query mastitis. UPPER ABDOMEN: Unremarkable. Bilateral axillary subcutaneous soft tissue fat stranding and small pockets of air consistent with recent bilateral axillary incision and drainages. There appears to be a small drain of the left axilla. There is soft tissue thickening without a clear organized fluid collection of the left or right axilla. There is asymmetric diffuse trabecular thickening and skin thickening with edematous change of the left breast, query mastitis. Patient should be evaluated in the breast center if not already seen preferably a few days after antibiotic therapy within the next 2-3 weeks to ensure no evidence of inflammatory breast cancer              PAST MEDICAL HISTORY  History reviewed. No pertinent past medical history.      PAST SURGICAL HISTORY  Past Surgical History:   Procedure Laterality Date within normal limits for wound care patient population. See the today's recorded vitals signs and constitutional data. Eyes: Pupils equal; Sclera are clear. EOMs intact; The eyes appear to track and move normally. The sclera are not injected. The conjunctive are clear. The eyelids are normal. There is no scleral icterus. ENMT: There are no obvious external ear, nose, lip or mouth lesions. Nares normal; Neck: Overall contour of the neck is normal with no obvious neck masses. Gross hearing is within normal limits. No obvious neck masses  Resp:  Breathing is non-labored; normal rate and effort; no audible wheezing. No breast masses on either side. Left breast wound and left axillary wound as shown below    She has acute MRSA cellulitis and folliculitis of the RIGHT axillae  No axillary adenopathy        CV: RRR;  no JVD; No evidence of cyanosis of the upper extremities. The extremities are perfused without embolic sign, splinter hemorrhages, or petechia. GI: soft and non-distended without acute abnormality noted. Musculoskeletal: unremarkable with normal function. No embolic signs or cyanosis. Neuro:  Oriented; moves all 4; no focal deficits  Psychiatric: Judgement and insight are within normal limits for the wound care population of patients. Patient is oriented to person, place, and time. Recent and remote memory are within normal limits. Mood and affect are within normal limits. Integumentary (Skin/Wounds)  See inspection of wound(s) below. There are no other skin areas of palpable concern. See wound center documentation including photos in the [address] Wound Template made part of this record by reference.          Wound Care Documentation:    Wound 07/15/22 Chest Lateral;Left I&D on 7/1/22 in ER for abscess (Active)   Wound Image   08/05/22 1125   Wound Etiology Surgical 08/05/22 1125   Dressing Status Intact 08/05/22 1125   Wound Cleansed Cleansed with saline 08/05/22 1125 Dressing/Treatment Iodoform gauze 08/05/22 1125   Wound Length (cm) 1.9 cm 08/05/22 1125   Wound Width (cm) 0.7 cm 08/05/22 1125   Wound Depth (cm) 0.6 cm 08/05/22 1125   Wound Surface Area (cm^2) 1.33 cm^2 08/05/22 1125   Change in Wound Size % (l*w) 52.5 08/05/22 1125   Wound Volume (cm^3) 0.798 cm^3 08/05/22 1125   Wound Healing % 80 08/05/22 1125   Undermining Starts ___ O'Clock 12 07/25/22 1048   Undermining Ends___ O'Clock 11 07/25/22 1048   Undermining Maxium Distance (cm) 0.7 07/25/22 1048   Wound Assessment Granulation tissue 08/05/22 1125   Drainage Amount Small 08/05/22 1125   Drainage Description Serosanguinous 08/05/22 1125   Odor None 08/05/22 1125   Dinah-wound Assessment Intact 08/05/22 1125   Wound Thickness Description not for Pressure Injury Full thickness 08/05/22 1125   Number of days: 21                Wound 07/15/22 Chest Lateral;Left I&D on 7/1/22 in ER for abscess (Active)   Wound Image   08/05/22 1125   Wound Etiology Surgical 08/05/22 1125   Dressing Status Intact 08/05/22 1125   Wound Cleansed Cleansed with saline 08/05/22 1125   Dressing/Treatment Iodoform gauze 08/05/22 1125   Wound Length (cm) 1.9 cm 08/05/22 1125   Wound Width (cm) 0.7 cm 08/05/22 1125   Wound Depth (cm) 0.6 cm 08/05/22 1125   Wound Surface Area (cm^2) 1.33 cm^2 08/05/22 1125   Change in Wound Size % (l*w) 52.5 08/05/22 1125   Wound Volume (cm^3) 0.798 cm^3 08/05/22 1125   Wound Healing % 80 08/05/22 1125   Undermining Starts ___ O'Clock 12 07/25/22 1048   Undermining Ends___ O'Clock 11 07/25/22 1048   Undermining Maxium Distance (cm) 0.7 07/25/22 1048   Wound Assessment Granulation tissue 08/05/22 1125   Drainage Amount Small 08/05/22 1125   Drainage Description Serosanguinous 08/05/22 1125   Odor None 08/05/22 1125   Dinah-wound Assessment Intact 08/05/22 1125   Wound Thickness Description not for Pressure Injury Full thickness 08/05/22 1125   Number of days: 21                                    Amount and/or Complexity of Data Reviewed and Analyzed:  I reviewed and analyzed all of the unique labs and radiologic studies that are shown below as well as any that are in the HPI, and any that are in the expanded problem list below  *Each unique test, order, or document contributes to the combination of 2 or combination of 3 in Category 1 below. I also independently reviewed radiology images for studies I described above in the HPI or studies I have ordered. For this visit I also reviewed old records and prior notes. No results for input(s): WBC, HGB, PLT, NA, K, CL, CO2, BUN, CREA, GLU, INR, APTT, ALT, AML, AML, LCAD, PCO2, PO2, HCO3 in the last 72 hours. Invalid input(s): PTP, TBIL, TBILI, CBIL, SGOT, GPT, AP, LPSE, NH4, TROPT, TROIQ,  PH  No results for input(s): INR, APTT, ALT, AML in the last 72 hours.     Invalid input(s): PTP, TBIL, TBILI, CBIL, SGOT, GPT, AP, LPSE    Most recent blood counts (CBC) review  Lab Results   Component Value Date    WBC 3.3 (L) 07/01/2022    HGB 10.8 (L) 07/01/2022    HCT 32.7 (L) 07/01/2022    MCV 86.5 07/01/2022     07/01/2022     Most recent chemistry (BMP) test review   Lab Results   Component Value Date/Time     07/02/2022 04:05 AM    K 3.7 07/02/2022 04:05 AM     07/02/2022 04:05 AM    CO2 27 07/02/2022 04:05 AM    BUN 14 07/02/2022 04:05 AM    CREATININE 0.60 07/02/2022 04:05 AM    GLUCOSE 82 07/02/2022 04:05 AM    CALCIUM 8.0 07/02/2022 04:05 AM      Most recent Liver enzyme test review  Lab Results   Component Value Date    ALT 31 07/02/2022    AST 43 (H) 07/02/2022    ALKPHOS 67 07/02/2022    BILITOT 0.4 07/02/2022     Most recent coagulation (coags) review  @lastinr@  No results found for: INR, APTT, AML, AML, LCAD    Diabetic assessment  No results found for: LABA1C  No results found for: EAG    Nutritional assessment screen to assess wound healing ability:  Lab Results   Component Value Date    LABALBU 2.3 (L) 07/02/2022     No results found for: TP, subcutaneous soft tissue fat stranding and small pockets of  air consistent with recent bilateral axillary incision and drainages. There  appears to be a small drain of the left axilla. There is soft tissue thickening  without a clear organized fluid collection of the left or right axilla. There is  asymmetric diffuse trabecular thickening and skin thickening with edematous  change of the left breast, query mastitis. Patient should be evaluated in the  breast center if not already seen preferably a few days after antibiotic therapy  within the next 2-3 weeks to ensure no evidence of inflammatory breast cancer. Admission date (for inpatients): 8/5/2022   Day of Surgery  * No procedures listed *    Assessment:      Problem List Items Addressed This Visit          Other    Cellulitis of left breast    Relevant Orders    Initiate Outpatient Wound Care Protocol    Open wound of left axillary region - Primary    Relevant Orders    Initiate Outpatient Wound Care Protocol    MRSA right axillary cellulitis    Relevant Orders    Initiate Outpatient Wound Care Protocol    Open wound of left breast    Relevant Orders    Initiate Outpatient Wound Care Protocol    Cellulitis of axilla, right    Relevant Orders    Initiate Outpatient Wound Care Protocol    MRSA cellulitis    Relevant Orders    Initiate Outpatient Wound Care Protocol           [unfilled]    Active Problems:    Cellulitis of left breast    Open wound of left axillary region    MRSA right axillary cellulitis    Open wound of left breast    Cellulitis of axilla, right    MRSA cellulitis  Resolved Problems:    * No resolved hospital problems.  *      Patient Active Problem List    Diagnosis Date Noted    Open wound of left axillary region 07/15/2022     Priority: Medium    MRSA right axillary cellulitis 07/15/2022     Priority: Medium    Open wound of left breast 07/15/2022     Priority: Medium    Cellulitis of axilla, right 07/15/2022     Priority: Medium    MRSA cellulitis 07/15/2022     Priority: Medium    Cellulitis of left breast 07/01/2022     Priority: Medium           Plan:     Number and Complexity of Problems addressed and   Risks of complications and/or morbidity of management    Treatment Note please see attached Discharge Instructions  Any procedures done today in the wound center (if documented in a separate note) in [facility] are made part of this record by reference  Written patient dismissal instructions given to patient or POA. MRSA infection of left breast and bilateral axilla   Surgical drainage not needed so far  I initially prescribed doxycycline q12hrs x 2 weeks  I considered Zyvox initially but she has no health insurance, Zyvox extremely expensive  The right axillary infection resolved with doxycycline but the left breast infection was not completely treated. Unfortunately she is allergic to Bactrim    I prescribed Zyvox on 7/29/22. She reported this prescription cost $1700 so she did not fill it. She instead filled the doxycycline prescription and was almost finished this prescription as of 8/5/2022. The wound cavity needs to be packed a little more frequently I would like her to try to participate in her wound care by doing this daily with a mirror. I demonstrated this to her while she was in the wound center. She has to pay for transportation to get to the wound center so we can change the dressing 3 times a week. She performed Hibiclens body scrub washes to decolonize given her recent history of MRSA. Left breast abscess I&D site  She was unable to pack this area on her own. She has no health insurance and is not homebound and therefore does not qualify for home health. She cannot afford home health  She has no family or friends with which we can teach. She has been coming to the wound center for packing changes 3 times a week but has to ride a bus to get here. She has no transportation.   We will repack the wound with iodoform each visit  She is still quite tender which limits her ability to pack the wound as well as we would like. Left axillary abscess I&D site--> healed as of 7/22/2022. The orifice was quite small. Recommend 1/4\" iodoform packing into the site daily. Wound care supplies and teaching given. She was unable to pack it so she has been coming to the wound center qmon/wed/fri  She does not have health insurance to cover home health services     She will need bilateral screening mammograms when she has completely healed  to rule out breast malignancy. Treatment significantly limited by social determinants of health. Ms. Juli Rutherford has no health coverage and cannot afford home health services. In addition she lives alone and has no help. It is difficult for her to reach and see these areas to pack them but she is doing the best she can. This impairs our ability to achieve resolution of her wounds. She has no transportation and rides a bus or takes a lift when needed. She has limited access to healthcare because of lack of health insurance and this impairs her ability to infect wound healing. I would like for her to have the packing changed each day but she has no friends or family to help her and she cannot change the packing on her own. She cannot afford home health.         Wound Care  Orders Placed This Encounter   Procedures    Initiate Outpatient Wound Care Protocol     Cleanse wound with saline    If wound contains bioburden or contamination cleanse with wound cleanser or antimicrobial solution     For normal periwound tissue without irritation nor maceration, apply topical skin protectant    For periwound tissue with irritation and/or maceration, apply zinc based product, topical steroid cream/ointment, or equivalent     For wounds with dry firm black eschar and/or without exudate, apply betadine and leave open to air      For wounds with scant/small to no exudate or drainage, apply wound gel, hydrocolloid, polymer, or equivalent and cover with secondary dressing/foam      For wounds with moderate/large exudate or drainage, apply alginate, hydrofiber, polymer, or equivalent and cover with secondary dressing/foam    For wounds with nonviable tissue requiring removal, apply chemical or mechanical debrider and cover with secondary dressing/foam    For wounds with tunneling, dead space, or cavity, fill or pack with strip/gauze/kerlex to fit and cover with secondary dressing/foam    For wounds with adequate granulation or epithelization, apply wound gel, hydrocolloid, polymer, collagen, or transparent film, and cover secondary dry dressing/foam    For wounds that need additional secondary dressing to help pad or control additional drainage/exudates, add foam, absorbent pad or hydrocolloid    For wounds with suspected or known infection, apply antimicrobial mesh and/or antimicrobial alginate/hydrofiber, or antimicrobial solution moistened gauze/kerlex, or equivalent and cover with secondary dressing/foam    Compression Management needed for edema control, apply multilayer compression or tubular garment or equivalent    Offloading Management needed for pressure relief, apply offloading shoe/boot or equivalent     Standing Status:   Standing     Number of Occurrences:   1           [unfilled]            Level of MDM (2/3 elements below)  Number and Complexity of Problems Addressed Amount and/or Complexity of Data to be Reviewed and Analyzed  *Each unique test, order, or document contributes to the combination of 2 or combination of 3 in Category 1 below. Risk of Complications and/or Morbidity or Mortality of pt Management     02040  78167 SF Minimal  ?1self-limited or minor problem Minimal or none Minimal risk of morbidity from additional diagnostic testing or Rx   85620  01851 Low Low  ? 2or more self-limited or minor problems;    or  ? 1stable chronic illness;    or  ?1acute, major surgery with identified patient or procedure risk factors  ? Decision regarding emergency major surgery  ? Decision regarding hospitalization  ? Decision not to resuscitate or to de-escalate care because of poor prognosis                 I have personally performed a face-to-face diagnostic evaluation and management  service on this patient. I have independently seen the patient. I have independently obtained the above history from the patient/family. I have independently examined the patient with above findings. I have independently reviewed data/labs for this patient and developed the above plan of care (MDM).       Electronically signed by Pepe Dobbs MD on 8/5/2022 at 11:57 AM

## 2022-08-05 NOTE — WOUND CARE
Discharge Instructions for  Radha Harrell  [address] [facility] [address]  [address], [address]  Phone [phone]   Fax [phone]      NAME:  Radha Peña  YOB: 1960  MEDICAL RECORD NUMBER:  [id number]  DATE:  8/5/2022    Return Appointment:  2 weeks with Vickey Daniels MD  1 week (Thursday) with Clinician      Instructions: Left Chest  Clean wound with Normal Saline. Pack chest wound with 1/4 inch iodoform gauze or the corner of one piece of slightly moistened gauze. Secure with minimal Paper Tape. Change DAILY at home. (May change in the wound center on Monday and Wednesday only if absolutely needed). Continue Doxycycline as prescribed    Increase protein intake as this greatly facilitates wound healing. Should you experience increased redness, swelling, pain, foul odor, size of wound(s), or have a temperature over 101 degrees please contact the [address] [place] [address] Road at [phone] or if after hours contact your primary care physician or go to the hospital emergency department. PLEASE NOTE: IF YOU ARE UNABLE TO OBTAIN WOUND SUPPLIES, CONTINUE TO USE THE SUPPLIES YOU HAVE AVAILABLE UNTIL YOU ARE ABLE TO REACH US. IT IS MOST IMPORTANT TO KEEP THE WOUND COVERED AT ALL TIMES. Electronically signed Mike Anne.  Yumiko Cárdenas RN on 8/5/2022 at 11:44 AM

## 2022-08-05 NOTE — FLOWSHEET NOTE
08/05/22 1125   Wound 07/15/22 Chest Lateral;Left I&D on 7/1/22 in ER for abscess   Date First Assessed/Time First Assessed: 07/15/22 0824   Present on Hospital Admission: Yes  Primary Wound Type: Surgical Type  Location: Chest  Wound Location Orientation: Lateral;Left  Wound Description (Comments): I&D on 7/1/22 in ER for abscess   Wound Image    Wound Etiology Surgical   Dressing Status Intact   Wound Cleansed Cleansed with saline   Dressing/Treatment Iodoform gauze   Wound Length (cm) 1.9 cm   Wound Width (cm) 0.7 cm   Wound Depth (cm) 0.6 cm   Wound Surface Area (cm^2) 1.33 cm^2   Change in Wound Size % (l*w) 52.5   Wound Volume (cm^3) 0.798 cm^3   Wound Healing % 80   Wound Assessment Granulation tissue   Drainage Amount Small   Drainage Description Serosanguinous   Odor None   Dinah-wound Assessment Intact   Wound Thickness Description not for Pressure Injury Full thickness     Patient is not on ANTICOAGULANT THERAPY. Wound mechanically debrided with gauze, Q tip, and normal saline.

## 2022-08-05 NOTE — DISCHARGE INSTRUCTIONS
Discharge Instructions for  Radha Harrell  [address] [place] [address]  [address], [address]  Phone [phone]   Fax [phone]      NAME:  Lili Inman  YOB: 1960  MEDICAL RECORD NUMBER:  [id number]  DATE:  @ED@    Return Appointment:   2 weeks with Shakir Adams MD  1 week (Thursday) with Clinician      Instructions:   Left Chest  Clean wound with Normal Saline. Pack chest wound with 1/4 inch iodoform gauze or the corner of one piece of slightly moistened gauze. Secure with minimal Paper Tape. Change DAILY at home. (May change in the wound center on Monday and Wednesday only if absolutely needed). Continue Doxycycline as prescribed    Increase protein intake as this greatly facilitates wound healing. Should you experience increased redness, swelling, pain, foul odor, size of wound(s), or have a temperature over 101 degrees please contact the [address] [place] [address] Road at [phone] or if after hours contact your primary care physician or go to the hospital emergency department. PLEASE NOTE: IF YOU ARE UNABLE TO OBTAIN WOUND SUPPLIES, CONTINUE TO USE THE SUPPLIES YOU HAVE AVAILABLE UNTIL YOU ARE ABLE TO REACH US. IT IS MOST IMPORTANT TO KEEP THE WOUND COVERED AT ALL TIMES. Electronically signed Mark Limon.  Kenneth Dumont RN on 8/5/2022 at 11:48 AM

## 2022-08-11 ENCOUNTER — HOSPITAL ENCOUNTER (OUTPATIENT)
Dept: WOUND CARE | Age: 62
Setting detail: RECURRING SERIES
Discharge: HOME | End: 2022-08-11

## 2022-08-11 VITALS
SYSTOLIC BLOOD PRESSURE: 106 MMHG | DIASTOLIC BLOOD PRESSURE: 67 MMHG | HEART RATE: 73 BPM | TEMPERATURE: 98.3 F | OXYGEN SATURATION: 97 %

## 2022-08-11 DIAGNOSIS — N61.0 CELLULITIS OF LEFT BREAST: ICD-10-CM

## 2022-08-11 DIAGNOSIS — L03.90 MRSA CELLULITIS: ICD-10-CM

## 2022-08-11 DIAGNOSIS — S41.102A OPEN WOUND OF LEFT AXILLARY REGION, INITIAL ENCOUNTER: Primary | ICD-10-CM

## 2022-08-11 DIAGNOSIS — S21.002A OPEN WOUND OF LEFT BREAST, INITIAL ENCOUNTER: ICD-10-CM

## 2022-08-11 DIAGNOSIS — B95.62 CELLULITIS OF RIGHT FOOT DUE TO METHICILLIN-RESISTANT STAPHYLOCOCCUS AUREUS: ICD-10-CM

## 2022-08-11 DIAGNOSIS — L03.111 CELLULITIS OF AXILLA, RIGHT: ICD-10-CM

## 2022-08-11 DIAGNOSIS — L03.115 CELLULITIS OF RIGHT FOOT DUE TO METHICILLIN-RESISTANT STAPHYLOCOCCUS AUREUS: ICD-10-CM

## 2022-08-11 DIAGNOSIS — B95.62 MRSA CELLULITIS: ICD-10-CM

## 2022-08-11 PROCEDURE — 99213 OFFICE O/P EST LOW 20 MIN: CPT

## 2022-08-11 RX ORDER — LIDOCAINE 50 MG/G
OINTMENT TOPICAL ONCE
Status: CANCELLED | OUTPATIENT
Start: 2022-08-11 | End: 2022-08-11

## 2022-08-11 RX ORDER — BACITRACIN, NEOMYCIN, POLYMYXIN B 400; 3.5; 5 [USP'U]/G; MG/G; [USP'U]/G
OINTMENT TOPICAL ONCE
Status: CANCELLED | OUTPATIENT
Start: 2022-08-11 | End: 2022-08-11

## 2022-08-11 RX ORDER — LIDOCAINE HYDROCHLORIDE 40 MG/ML
SOLUTION TOPICAL ONCE
Status: CANCELLED | OUTPATIENT
Start: 2022-08-11 | End: 2022-08-11

## 2022-08-11 RX ORDER — LIDOCAINE HYDROCHLORIDE 20 MG/ML
JELLY TOPICAL ONCE
Status: CANCELLED | OUTPATIENT
Start: 2022-08-11 | End: 2022-08-11

## 2022-08-11 RX ORDER — CLOBETASOL PROPIONATE 0.5 MG/G
OINTMENT TOPICAL ONCE
Status: CANCELLED | OUTPATIENT
Start: 2022-08-11 | End: 2022-08-11

## 2022-08-11 RX ORDER — BACITRACIN ZINC AND POLYMYXIN B SULFATE 500; 1000 [USP'U]/G; [USP'U]/G
OINTMENT TOPICAL ONCE
Status: CANCELLED | OUTPATIENT
Start: 2022-08-11 | End: 2022-08-11

## 2022-08-11 RX ORDER — LIDOCAINE 40 MG/G
CREAM TOPICAL ONCE
Status: CANCELLED | OUTPATIENT
Start: 2022-08-11 | End: 2022-08-11

## 2022-08-11 RX ORDER — GINSENG 100 MG
CAPSULE ORAL ONCE
Status: CANCELLED | OUTPATIENT
Start: 2022-08-11 | End: 2022-08-11

## 2022-08-11 RX ORDER — GENTAMICIN SULFATE 1 MG/G
OINTMENT TOPICAL ONCE
Status: CANCELLED | OUTPATIENT
Start: 2022-08-11 | End: 2022-08-11

## 2022-08-11 RX ORDER — BETAMETHASONE DIPROPIONATE 0.05 %
OINTMENT (GRAM) TOPICAL ONCE
Status: CANCELLED | OUTPATIENT
Start: 2022-08-11 | End: 2022-08-11

## 2022-08-11 NOTE — FLOWSHEET NOTE
08/11/22 1452   Wound 07/15/22 Chest Lateral;Left I&D on 7/1/22 in ER for abscess   Date First Assessed/Time First Assessed: 07/15/22 0824   Present on Hospital Admission: Yes  Primary Wound Type: Surgical Type  Location: Chest  Wound Location Orientation: Lateral;Left  Wound Description (Comments): I&D on 7/1/22 in ER for abscess   Wound Image    Wound Etiology Surgical   Dressing Status Intact   Wound Cleansed Cleansed with saline   Dressing/Treatment Iodoform gauze   Wound Length (cm) 1.5 cm   Wound Width (cm) 0.7 cm   Wound Depth (cm) 0.6 cm   Wound Surface Area (cm^2) 1.05 cm^2   Change in Wound Size % (l*w) 62.5   Wound Volume (cm^3) 0.63 cm^3   Wound Healing % 84   Wound Assessment Granulation tissue   Drainage Amount Small   Drainage Description Serosanguinous   Odor Mild   [place]inah-wound Assessment Intact   Wound Thickness Description not for Pressure Injury Full thickness   Pain Assessment   Pain Assessment 0-10   Pain Level 2   Patient's Stated Pain Goal 0 - No pain   Pain Location Breast   Pain Orientation Left   Pain Descriptors Discomfort

## 2022-08-19 ENCOUNTER — HOSPITAL ENCOUNTER (OUTPATIENT)
Dept: WOUND CARE | Age: 62
Discharge: HOME | End: 2022-08-19

## 2022-08-19 VITALS
WEIGHT: 135 LBS | HEART RATE: 73 BPM | HEIGHT: 66 IN | DIASTOLIC BLOOD PRESSURE: 79 MMHG | TEMPERATURE: 98.1 F | SYSTOLIC BLOOD PRESSURE: 111 MMHG | BODY MASS INDEX: 21.69 KG/M2

## 2022-08-19 DIAGNOSIS — S21.002D OPEN WOUND OF LEFT BREAST, SUBSEQUENT ENCOUNTER: ICD-10-CM

## 2022-08-19 DIAGNOSIS — N61.0 CELLULITIS OF LEFT BREAST: ICD-10-CM

## 2022-08-19 DIAGNOSIS — L03.111 CELLULITIS OF AXILLA, RIGHT: ICD-10-CM

## 2022-08-19 DIAGNOSIS — S41.102D OPEN WOUND OF LEFT AXILLARY REGION, SUBSEQUENT ENCOUNTER: Primary | ICD-10-CM

## 2022-08-19 DIAGNOSIS — B95.62 MRSA CELLULITIS: ICD-10-CM

## 2022-08-19 DIAGNOSIS — L03.115 CELLULITIS OF RIGHT FOOT DUE TO METHICILLIN-RESISTANT STAPHYLOCOCCUS AUREUS: ICD-10-CM

## 2022-08-19 DIAGNOSIS — B95.62 CELLULITIS OF RIGHT FOOT DUE TO METHICILLIN-RESISTANT STAPHYLOCOCCUS AUREUS: ICD-10-CM

## 2022-08-19 DIAGNOSIS — L03.90 MRSA CELLULITIS: ICD-10-CM

## 2022-08-19 PROCEDURE — 99214 OFFICE O/P EST MOD 30 MIN: CPT | Performed by: SURGERY

## 2022-08-19 PROCEDURE — 99212 OFFICE O/P EST SF 10 MIN: CPT

## 2022-08-19 ASSESSMENT — PAIN SCALES - GENERAL: PAINLEVEL_OUTOF10: 2

## 2022-08-19 ASSESSMENT — PAIN DESCRIPTION - DESCRIPTORS: DESCRIPTORS: DISCOMFORT

## 2022-08-19 ASSESSMENT — PAIN DESCRIPTION - LOCATION: LOCATION: BREAST

## 2022-08-19 ASSESSMENT — PAIN DESCRIPTION - ORIENTATION: ORIENTATION: LEFT

## 2022-08-19 NOTE — PROGRESS NOTES
[address]  Consult Note/H&P/Progress Note      Ry Adkins RECORD NUMBER:  [id number]  AGE: 62 y.o. RACE White (non-)  GENDER: female  : 1960  EPISODE DATE:  2022       Subjective:      CC (Chief Complaint) and HISTORY of PRESENT ILLNESS (HPI):    Orin Lorenzo is a 62 y.o. female who presents today for wound/ulcer evaluation. She saw me for her first visit at the wound center on 7/15/22. She was admitted by the hospitalists on 22 from the ER with bilateral breast cellulitis. She was s/p I&D of left axillary abscess done at an urgent treatment center in late 2022. She was placed on doxycycline but her infection progressed and she came to the ER where she was admitted by the hospitalist for IV antibiotics for progressive left breast and left cellulitis. She had an I&D performed by Dr. Mitchell Reilly on 7/3/2022 for MRSA left outer breast/ left axillary abscess  She has 2 surgical incisions with open wounds on the left side  No recent surgery on the right side  She was discharged on Bactrim and iodoform packing. She is not up-to-date with her mammograms as of 22. This information was obtained from the patient  The following HPI elements were documented for the patient's wound:  Location: Left breast wound  Duration: Since 7/3/2022  Severity: Moderate severity; grew MRSA  Context:    Modifying Factors:  Nothing makes better or worse  Quality:    Timing:     Associated Signs and Symptoms: No fevers      Ulcer Identification:  Ulcer Type: Infectious  Contributing Factors: No tobacco and no history of diabetes         22 CT chest  Hx: Left-sided breast pain. Bilateral axillary abscesses status post I and D 2 days prior. COMPARISON: Same day chest radiograph. Multiple axial images were obtained through the chest after intravenous injection of 80 mL of Isovue 370.   Radiation dose reduction techniques were used for this study: All CT scans performed at this facility use one or all of the following: Automated exposure control, adjustment of the mA and/or kVp according to patient's size, iterative reconstruction. FINDINGS: LUNGS/PLEURA: The central airways are patent. The lungs are within normal limits. No suspicious pulmonary nodules. No pleural effusion or pneumothorax. No focal pleural lesion. MEDIASTINUM: The thoracic aorta and main pulmonary artery are within normal limits. The heart is normal in size. No pericardial effusion. No suspicious thyroid nodule. The esophagus is normal. No pathologic adenopathy. BONES AND SOFT TISSUES: Bilateral axillary subcutaneous soft tissue fat stranding and small pockets of air consistent with recent bilateral axillary incision and drainages. There appears to be a small drain of the left axilla. There is soft tissue thickening without a clear organized fluid collection of the left or right axilla. There is asymmetric diffuse trabecular thickening and skin thickening with edematous change of the left breast, query mastitis. UPPER ABDOMEN: Unremarkable. Bilateral axillary subcutaneous soft tissue fat stranding and small pockets of air consistent with recent bilateral axillary incision and drainages. There appears to be a small drain of the left axilla. There is soft tissue thickening without a clear organized fluid collection of the left or right axilla. There is asymmetric diffuse trabecular thickening and skin thickening with edematous change of the left breast, query mastitis. Patient should be evaluated in the breast center if not already seen preferably a few days after antibiotic therapy within the next 2-3 weeks to ensure no evidence of inflammatory breast cancer              PAST MEDICAL HISTORY  History reviewed. No pertinent past medical history.      PAST SURGICAL HISTORY  Past Surgical History:   Procedure Laterality Date BREAST SURGERY Left 7/3/2022    BREAST INCISION AND DRAINAGE LEFT performed by Tara Li MD at [facility] SURGERY Left 2009       FAMILY HISTORY  History reviewed. No pertinent family history. SOCIAL HISTORY  Social History     Tobacco Use    Smoking status: Never    Smokeless tobacco: Never   Vaping Use    Vaping Use: Never used   Substance Use Topics    Alcohol use: Yes     Alcohol/week: 1.0 standard drink     Types: 1 Shots of liquor per week     Comment: Every 3 weeks    Drug use: Never       ALLERGIES  Allergies   Allergen Reactions    Bactrim [Sulfamethoxazole-Trimethoprim] Rash       MEDICATIONS  No current outpatient medications on file prior to encounter. No current facility-administered medications on file prior to encounter. REVIEW OF SYSTEMS  The patient has no difficulty with chest pain or shortness of breath. No fever or chills. Comprehensive review of systems was otherwise unremarkable except as noted above. Objective:   Physical Exam:   Recent vitals (if inpt):  Patient Vitals for the past 24 hrs:   BP Temp Temp src Pulse Height Weight   08/19/22 0913 111/79 98.1 °F (36.7 °C) Oral 73 -- --   08/19/22 0907 -- -- -- -- 5' 6\" (1.676 m) 135 lb (61.2 kg)             /79   Pulse 73   Temp 98.1 °F (36.7 °C) (Oral)   Ht 5' 6\" (1.676 m)   Wt 135 lb (61.2 kg)   BMI 21.79 kg/m²   Wt Readings from Last 3 Encounters:   08/19/22 135 lb (61.2 kg)   08/05/22 132 lb 12.8 oz (60.2 kg)   08/03/22 133 lb 6.4 oz (60.5 kg)     Constitutional: Alert, oriented, cooperative patient in no acute distress; appears stated age;  General appearance is within normal limits for wound care patient population. See the today's recorded vitals signs and constitutional data. Eyes: Pupils equal; Sclera are clear. EOMs intact; The eyes appear to track and move normally. The sclera are not injected. The conjunctive are clear.  The eyelids are normal. There is no scleral icterus. ENMT: There are no obvious external ear, nose, lip or mouth lesions. Nares normal; Neck: Overall contour of the neck is normal with no obvious neck masses. Gross hearing is within normal limits. No obvious neck masses  Resp:  Breathing is non-labored; normal rate and effort; no audible wheezing. No breast masses on either side. Left breast wound and left axillary wound as shown below    She has acute MRSA cellulitis and folliculitis of the RIGHT axillae  No axillary adenopathy        CV: RRR;  no JVD; No evidence of cyanosis of the upper extremities. The extremities are perfused without embolic sign, splinter hemorrhages, or petechia. GI: soft and non-distended without acute abnormality noted. Musculoskeletal: unremarkable with normal function. No embolic signs or cyanosis. Neuro:  Oriented; moves all 4; no focal deficits  Psychiatric: Judgement and insight are within normal limits for the wound care population of patients. Patient is oriented to person, place, and time. Recent and remote memory are within normal limits. Mood and affect are within normal limits. Integumentary (Skin/Wounds)  See inspection of wound(s) below. There are no other skin areas of palpable concern. See wound center documentation including photos in the [address] Wound Template made part of this record by reference. Wound Care Documentation:    Wound 07/15/22 Chest Lateral;Left I&D on 7/1/22 in ER for abscess (Active)   Wound Image   08/19/22 0913   Wound Etiology Surgical 08/19/22 0913   Dressing Status Clean;Dry; Intact 08/19/22 0913   Wound Cleansed Cleansed with saline 08/19/22 0913   Dressing/Treatment Iodoform gauze 08/19/22 0913   Wound Length (cm) 0.5 cm 08/19/22 0913   Wound Width (cm) 0.4 cm 08/19/22 0913   Wound Depth (cm) 0.8 cm 08/19/22 0913   Wound Surface Area (cm^2) 0.2 cm^2 08/19/22 0913   Change in Wound Size % (l*w) 92.86 08/19/22 0913   Wound Volume (cm^3) 0.16 cm^3 08/19/22 0913   Wound Healing % 96 08/19/22 0913   Undermining Starts ___ O'Clock 12 07/25/22 1048   Undermining Ends___ O'Clock 11 07/25/22 1048   Undermining Maxium Distance (cm) 0.7 07/25/22 1048   Wound Assessment Granulation tissue 08/19/22 0913   Drainage Amount Scant 08/19/22 0913   Drainage Description Serous 08/19/22 0913   Odor None 08/19/22 0913   Dinah-wound Assessment Intact 08/19/22 0913   Margins Attached edges; Defined edges 08/19/22 0913   Wound Thickness Description not for Pressure Injury Full thickness 08/19/22 0913   Number of days: 35                Wound 07/15/22 Chest Lateral;Left I&D on 7/1/22 in ER for abscess (Active)   Wound Image   08/19/22 0913   Wound Etiology Surgical 08/19/22 0913   Dressing Status Clean;Dry; Intact 08/19/22 0913   Wound Cleansed Cleansed with saline 08/19/22 0913   Dressing/Treatment Iodoform gauze 08/19/22 0913   Wound Length (cm) 0.5 cm 08/19/22 0913   Wound Width (cm) 0.4 cm 08/19/22 0913   Wound Depth (cm) 0.8 cm 08/19/22 0913   Wound Surface Area (cm^2) 0.2 cm^2 08/19/22 0913   Change in Wound Size % (l*w) 92.86 08/19/22 0913   Wound Volume (cm^3) 0.16 cm^3 08/19/22 0913   Wound Healing % 96 08/19/22 0913   Undermining Starts ___ O'Clock 12 07/25/22 1048   Undermining Ends___ O'Clock 11 07/25/22 1048   Undermining Maxium Distance (cm) 0.7 07/25/22 1048   Wound Assessment Granulation tissue 08/19/22 0913   Drainage Amount Scant 08/19/22 0913   Drainage Description Serous 08/19/22 0913   Odor None 08/19/22 0913   Dinah-wound Assessment Intact 08/19/22 0913   Margins Attached edges; Defined edges 08/19/22 0913   Wound Thickness Description not for Pressure Injury Full thickness 08/19/22 0913   Number of days: 35                                      Amount and/or Complexity of Data Reviewed and Analyzed:  I reviewed and analyzed all of the unique labs and radiologic studies that are shown below as well as any that are in the HPI, and any that are in the expanded problem list below  *Each unique test, order, or document contributes to the combination of 2 or combination of 3 in Category 1 below. I also independently reviewed radiology images for studies I described above in the HPI or studies I have ordered. For this visit I also reviewed old records and prior notes. No results for input(s): WBC, HGB, PLT, NA, K, CL, CO2, BUN, CREA, GLU, INR, APTT, ALT, AML, AML, LCAD, PCO2, PO2, HCO3 in the last 72 hours. Invalid input(s): PTP, TBIL, TBILI, CBIL, SGOT, GPT, AP, LPSE, NH4, TROPT, TROIQ,  PH  No results for input(s): INR, APTT, ALT, AML in the last 72 hours. Invalid input(s): PTP, TBIL, TBILI, CBIL, SGOT, GPT, AP, LPSE    Most recent blood counts (CBC) review  Lab Results   Component Value Date    WBC 3.3 (L) 07/01/2022    HGB 10.8 (L) 07/01/2022    HCT 32.7 (L) 07/01/2022    MCV 86.5 07/01/2022     07/01/2022     Most recent chemistry (BMP) test review   Lab Results   Component Value Date/Time     07/02/2022 04:05 AM    K 3.7 07/02/2022 04:05 AM     07/02/2022 04:05 AM    CO2 27 07/02/2022 04:05 AM    BUN 14 07/02/2022 04:05 AM    CREATININE 0.60 07/02/2022 04:05 AM    GLUCOSE 82 07/02/2022 04:05 AM    CALCIUM 8.0 07/02/2022 04:05 AM      Most recent Liver enzyme test review  Lab Results   Component Value Date    ALT 31 07/02/2022    AST 43 (H) 07/02/2022    ALKPHOS 67 07/02/2022    BILITOT 0.4 07/02/2022     Most recent coagulation (coags) review  @lastinr@  No results found for: INR, APTT, AML, AML, LCAD    Diabetic assessment  No results found for: LABA1C  No results found for: EAG    Nutritional assessment screen to assess wound healing ability:  Lab Results   Component Value Date    LABALBU 2.3 (L) 07/02/2022     No results found for: TP, ALBR, ALB    Xray Result (most recent):  XR CHEST PORTABLE 07/01/2022    Narrative  EXAM: Single view chest radiograph. INDICATION: Left-sided breast pain. COMPARISON: None.     FINDINGS:  No focal lung consolidation. No pneumothorax. No pleural effusion. The heart is  normal in size. No evidence of acute osseous abnormality. Impression  1. No acute cardiopulmonary abnormality. CT Result (most recent):  CT CHEST W CONTRAST 07/01/2022    Narrative  EXAM: Chest CT with contrast.  INDICATION: Left-sided breast pain. Bilateral axillary abscesses status post I  and D 2 days prior. COMPARISON: Same day chest radiograph. Multiple axial images were obtained through the chest after intravenous  injection of 80 mL of Isovue 370. Radiation dose reduction techniques were used  for this study: All CT scans performed at this facility use one or all of the  following: Automated exposure control, adjustment of the mA and/or kVp according  to patient's size, iterative reconstruction. FINDINGS:  LUNGS/PLEURA:  The central airways are patent. The lungs are within normal limits. No  suspicious pulmonary nodules. No pleural effusion or pneumothorax. No focal  pleural lesion. MEDIASTINUM:  The thoracic aorta and main pulmonary artery are within normal limits. The heart  is normal in size. No pericardial effusion. No suspicious thyroid nodule. The  esophagus is normal. No pathologic adenopathy. BONES AND SOFT TISSUES:  Bilateral axillary subcutaneous soft tissue fat stranding and small pockets of  air consistent with recent bilateral axillary incision and drainages. There  appears to be a small drain of the left axilla. There is soft tissue thickening  without a clear organized fluid collection of the left or right axilla. There is  asymmetric diffuse trabecular thickening and skin thickening with edematous  change of the left breast, query mastitis. UPPER ABDOMEN:  Unremarkable. Impression  Bilateral axillary subcutaneous soft tissue fat stranding and small pockets of  air consistent with recent bilateral axillary incision and drainages. There  appears to be a small drain of the left axilla.  There is soft tissue thickening  without a clear organized fluid collection of the left or right axilla. There is  asymmetric diffuse trabecular thickening and skin thickening with edematous  change of the left breast, query mastitis. Patient should be evaluated in the  breast center if not already seen preferably a few days after antibiotic therapy  within the next 2-3 weeks to ensure no evidence of inflammatory breast cancer. Admission date (for inpatients): 8/19/2022   Day of Surgery  * No procedures listed *    Assessment:      Problem List Items Addressed This Visit          Other    Cellulitis of left breast    Open wound of left axillary region - Primary    MRSA right axillary cellulitis    Open wound of left breast    Cellulitis of axilla, right    MRSA cellulitis           [unfilled]    Active Problems:    Cellulitis of left breast    Open wound of left axillary region    MRSA right axillary cellulitis    Open wound of left breast    Cellulitis of axilla, right    MRSA cellulitis  Resolved Problems:    * No resolved hospital problems. *      Patient Active Problem List    Diagnosis Date Noted    Open wound of left axillary region 07/15/2022     Priority: Medium    MRSA right axillary cellulitis 07/15/2022     Priority: Medium    Open wound of left breast 07/15/2022     Priority: Medium    Cellulitis of axilla, right 07/15/2022     Priority: Medium    MRSA cellulitis 07/15/2022     Priority: Medium    Cellulitis of left breast 07/01/2022     Priority: Medium           Plan:     Number and Complexity of Problems addressed and   Risks of complications and/or morbidity of management    Treatment Note please see attached Discharge Instructions  Any procedures done today in the wound center (if documented in a separate note) in [facility] are made part of this record by reference  Written patient dismissal instructions given to patient or POA.              MRSA infection of left breast and bilateral axilla   Surgical drainage not needed so far  I initially prescribed doxycycline q12hrs x 2 weeks  I considered Zyvox initially but she has no health insurance, Zyvox extremely expensive  The right axillary infection resolved with doxycycline but the left breast infection was not completely treated. Unfortunately she is allergic to Bactrim    I prescribed Zyvox on 7/29/22. She reported this prescription cost $1700 so she did not fill it. She instead filled the doxycycline prescription and was almost finished this prescription as of 8/5/2022. The wound cavity needs to be packed a little more frequently I would like her to try to participate in her wound care by doing this daily with a mirror. I demonstrated this to her while she was in the wound center. She has to pay for transportation to get to the wound center so we can change the dressing 3 times a week. She performed Hibiclens body scrub washes to decolonize given her recent history of MRSA. Left breast abscess I&D site  She was unable to pack this area on her own. She has no health insurance and is not homebound and therefore does not qualify for home health. She cannot afford home health  She has no family or friends with which we can teach. She has been coming to the wound center for packing changes 3 times a week but has to ride a bus to get here. She has no transportation. We will repack the wound with iodoform each visit  She is still quite tender which limits her ability to pack the wound as well as we would like. Left axillary abscess I&D site--> healed as of 7/22/2022. The orifice was quite small. Recommend 1/4\" iodoform packing into the site daily. Wound care supplies and teaching given.   She was unable to pack it so she has been coming to the wound center qmon/wed/fri  She does not have health insurance to cover home health services     She will need bilateral screening mammograms when she has completely healed  to rule out breast malignancy. Treatment significantly limited by social determinants of health. Ms. Óscar Serrano has no health coverage and cannot afford home health services. In addition she lives alone and has no help. It is difficult for her to reach and see these areas to pack them but she is doing the best she can. This impairs our ability to achieve resolution of her wounds. She has no transportation and rides a bus or takes a lift when needed. She has limited access to healthcare because of lack of health insurance and this impairs her ability to infect wound healing. I would like for her to have the packing changed each day but she has no friends or family to help her and she cannot change the packing on her own. She cannot afford home health. Wound Care  No orders of the defined types were placed in this encounter. [unfilled]            Level of MDM (2/3 elements below)  Number and Complexity of Problems Addressed Amount and/or Complexity of Data to be Reviewed and Analyzed  *Each unique test, order, or document contributes to the combination of 2 or combination of 3 in Category 1 below. Risk of Complications and/or Morbidity or Mortality of pt Management     60249  [id number] SF Minimal  ?1self-limited or minor problem Minimal or none Minimal risk of morbidity from additional diagnostic testing or Rx   33997  24702 Low Low  ? 2or more self-limited or minor problems;    or  ? 1stable chronic illness;    or  ?1acute, uncomplicated illness or injury   Limited  (Must meet the requirements of at least 1 of the 2 categories)  Category 1: Tests and documents   ? Any combination of 2 from the following:  ?Review of prior external note(s) from each unique source*;  ?review of the result(s) of each unique test*;   ?ordering of each unique test*    or   Category 2: Assessment requiring an independent historian(s)  (For the categories of independent interpretation of tests and discussion of management or test interpretation, see moderate or high) Low risk of morbidity from additional diagnostic testing or treatment     25608  48552 Mod Moderate  ? 1or more chronic illnesses with exacerbation, progression, or side effects of treatment;    or  ?2or more stable chronic illnesses     or  ? 1undiagnosed new problem with uncertain prognosis--recent left breast abscess with no history of mammograms. It is uncertain if she has breast neoplasm or pathology. She will need mammogram after the acute infection has resolved to clear the uncertainty. 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 ;    or  ?1acute illness with systemic symptoms;    or  ?1acute complicated injury   Moderate  (Must meet the requirements of at least 1 out of 3 categories)  Category 1: Tests, documents, or independent historian(s)  ? Any combination of 3 from the following:   ?Review of prior external note(s) from each unique source*;  ?Review of the result(s) of each unique test*;  ?Ordering of each unique test*;  ?Assessment requiring an independent historian(s)    or  Category 2: Independent interpretation of tests   ? Independent interpretation of a test performed by another physician/other qualified health care professional (not separately reported);     or  Category 3: Discussion of management or test interpretation  ? Discussion of management or test interpretation with external physician/other qualified health care professional/appropriate source (not separately reported)   Moderate risk of morbidity from additional diagnostic testing or treatment  Examples only:  ?Prescription drug management - advanced wound care prescription Rx wound care products--doxycyline and iodoform  (eg Iodosorb, hydrofera, silvadene, collagen, puracol plus, optiloc, biologics - placenta, Theraskin, Apligraf).   Note also that if home health care is involved, they will not apply topical therapies without a prescription/order from physician ?Decision regarding minor surgery with identified patient or procedure risk factors  ? Decision regarding elective major surgery without identified patient or procedure risk factors   ? Diagnosis or treatment significantly limited by social determinants of health       [id number] High High  ? 1or more chronic illnesses with severe exacerbation, progression, or side effects of treatment;    or  ?1 acute or chronic illness or injury that poses a threat to life or bodily function   Extensive  (Must meet the requirements of at least 2 out of 3 categories)  Category 1: Tests, documents, or independent historian(s)  ? Any combination of 3 from the following:   ?Review of prior external note(s) from each unique source*;  ?Review of the result(s) of each unique test*;   ?Ordering of each unique test*;   ?Assessment requiring an independent historian(s)    or   Category 2: Independent interpretation of tests   ? Independent interpretation of a test performed by another physician/other qualified health care professional (not separately reported);     or  Category 3: Discussion of management or test interpretation  ? Discussion of management or test interpretation with external physician/other qualified health care professional/appropriate source (not separately reported)   High risk of morbidity from additional diagnostic testing or treatment  Examples only:  ?Drug therapy requiring intensive monitoring for toxicity  ? Decision regarding elective major surgery with identified patient or procedure risk factors  ? Decision regarding emergency major surgery  ? Decision regarding hospitalization  ? Decision not to resuscitate or to de-escalate care because of poor prognosis                 I have personally performed a face-to-face diagnostic evaluation and management  service on this patient. I have independently seen the patient. I have independently obtained the above history from the patient/family.     I have independently examined the patient with above findings. I have independently reviewed data/labs for this patient and developed the above plan of care (MDM).       Electronically signed by Margi Donato MD on 8/19/2022 at 9:25 AM

## 2022-08-19 NOTE — DISCHARGE INSTRUCTIONS
Discharge Instructions for  Radha Harrell  [address] [facility] [address]  [address], [address] [facility]  Phone [phone]  Fax [phone]        NAME:  Tha Grigsby  YOB: 1960  MEDICAL RECORD NUMBER:  [id number]  DATE:  8/19/2022     Return Appointment:   1 week with Veronica Herron MD     Instructions: Left Chest  Clean wound with Normal Saline. Pack chest wound with 1/4 inch iodoform gauze or the corner of one piece of slightly moistened gauze. Secure with minimal Paper Tape. Change DAILY at home. Increase protein intake as this greatly facilitates wound healing. Should you experience increased redness, swelling, pain, foul odor, size of wound(s), or have a temperature over 101 degrees please contact the [address] [place] [address] Road at [phone] or if after hours contact your primary care physician or go to the hospital emergency department. PLEASE NOTE: IF YOU ARE UNABLE TO OBTAIN WOUND SUPPLIES, CONTINUE TO USE THE SUPPLIES YOU HAVE AVAILABLE UNTIL YOU ARE ABLE TO REACH US. IT IS MOST IMPORTANT TO KEEP THE WOUND COVERED AT ALL TIMES.      Electronically signed Bryan Sandoval RN on 8/19/2022 at 9:31 AM

## 2022-08-19 NOTE — WOUND CARE
Discharge Instructions for  Radha Harrell  [address] [place] [address] [address], [address]  Phone [phone]   Fax [phone]      NAME:  Len Durant  YOB: 1960  MEDICAL RECORD NUMBER:  [id number]  DATE:  8/19/2022    Return Appointment:   1 week with Ham Figueroa MD    Instructions: Left Chest  Clean wound with Normal Saline. Pack chest wound with 1/4 inch iodoform gauze or the corner of one piece of slightly moistened gauze. Secure with minimal Paper Tape. Change DAILY at home. Increase protein intake as this greatly facilitates wound healing. Should you experience increased redness, swelling, pain, foul odor, size of wound(s), or have a temperature over 101 degrees please contact the [address] [place] [address] Road at [phone] or if after hours contact your primary care physician or go to the hospital emergency department. PLEASE NOTE: IF YOU ARE UNABLE TO OBTAIN WOUND SUPPLIES, CONTINUE TO USE THE SUPPLIES YOU HAVE AVAILABLE UNTIL YOU ARE ABLE TO REACH US. IT IS MOST IMPORTANT TO KEEP THE WOUND COVERED AT ALL TIMES.     Electronically signed John Leyva RN on 8/19/2022 at 9:31 AM

## 2022-08-26 ENCOUNTER — HOSPITAL ENCOUNTER (OUTPATIENT)
Dept: WOUND CARE | Age: 62
Discharge: HOME | End: 2022-08-26

## 2022-08-26 VITALS
TEMPERATURE: 98.2 F | BODY MASS INDEX: 21.53 KG/M2 | DIASTOLIC BLOOD PRESSURE: 83 MMHG | HEART RATE: 103 BPM | SYSTOLIC BLOOD PRESSURE: 114 MMHG | WEIGHT: 134 LBS | RESPIRATION RATE: 18 BRPM | HEIGHT: 66 IN

## 2022-08-26 DIAGNOSIS — S41.102A OPEN WOUND OF LEFT AXILLARY REGION, INITIAL ENCOUNTER: Primary | ICD-10-CM

## 2022-08-26 DIAGNOSIS — L03.115 CELLULITIS OF RIGHT FOOT DUE TO METHICILLIN-RESISTANT STAPHYLOCOCCUS AUREUS: ICD-10-CM

## 2022-08-26 DIAGNOSIS — L03.111 CELLULITIS OF AXILLA, RIGHT: ICD-10-CM

## 2022-08-26 DIAGNOSIS — N61.0 CELLULITIS OF LEFT BREAST: ICD-10-CM

## 2022-08-26 DIAGNOSIS — L03.90 MRSA CELLULITIS: ICD-10-CM

## 2022-08-26 DIAGNOSIS — B95.62 MRSA CELLULITIS: ICD-10-CM

## 2022-08-26 DIAGNOSIS — S21.002A OPEN WOUND OF LEFT BREAST, INITIAL ENCOUNTER: ICD-10-CM

## 2022-08-26 DIAGNOSIS — B95.62 CELLULITIS OF RIGHT FOOT DUE TO METHICILLIN-RESISTANT STAPHYLOCOCCUS AUREUS: ICD-10-CM

## 2022-08-26 PROCEDURE — 99213 OFFICE O/P EST LOW 20 MIN: CPT | Performed by: SURGERY

## 2022-08-26 PROCEDURE — 99212 OFFICE O/P EST SF 10 MIN: CPT

## 2022-08-26 RX ORDER — GENTAMICIN SULFATE 1 MG/G
OINTMENT TOPICAL ONCE
Status: CANCELLED | OUTPATIENT
Start: 2022-08-26 | End: 2022-08-26

## 2022-08-26 RX ORDER — LIDOCAINE 50 MG/G
OINTMENT TOPICAL ONCE
Status: CANCELLED | OUTPATIENT
Start: 2022-08-26 | End: 2022-08-26

## 2022-08-26 RX ORDER — BETAMETHASONE DIPROPIONATE 0.05 %
OINTMENT (GRAM) TOPICAL ONCE
Status: CANCELLED | OUTPATIENT
Start: 2022-08-26 | End: 2022-08-26

## 2022-08-26 RX ORDER — BACITRACIN, NEOMYCIN, POLYMYXIN B 400; 3.5; 5 [USP'U]/G; MG/G; [USP'U]/G
OINTMENT TOPICAL ONCE
Status: CANCELLED | OUTPATIENT
Start: 2022-08-26 | End: 2022-08-26

## 2022-08-26 RX ORDER — BACITRACIN ZINC AND POLYMYXIN B SULFATE 500; 1000 [USP'U]/G; [USP'U]/G
OINTMENT TOPICAL ONCE
Status: CANCELLED | OUTPATIENT
Start: 2022-08-26 | End: 2022-08-26

## 2022-08-26 RX ORDER — GINSENG 100 MG
CAPSULE ORAL ONCE
Status: CANCELLED | OUTPATIENT
Start: 2022-08-26 | End: 2022-08-26

## 2022-08-26 RX ORDER — LIDOCAINE 40 MG/G
CREAM TOPICAL ONCE
Status: CANCELLED | OUTPATIENT
Start: 2022-08-26 | End: 2022-08-26

## 2022-08-26 RX ORDER — LIDOCAINE HYDROCHLORIDE 40 MG/ML
SOLUTION TOPICAL ONCE
Status: CANCELLED | OUTPATIENT
Start: 2022-08-26 | End: 2022-08-26

## 2022-08-26 RX ORDER — LIDOCAINE HYDROCHLORIDE 20 MG/ML
JELLY TOPICAL ONCE
Status: CANCELLED | OUTPATIENT
Start: 2022-08-26 | End: 2022-08-26

## 2022-08-26 RX ORDER — CLOBETASOL PROPIONATE 0.5 MG/G
OINTMENT TOPICAL ONCE
Status: CANCELLED | OUTPATIENT
Start: 2022-08-26 | End: 2022-08-26

## 2022-08-26 NOTE — FLOWSHEET NOTE
08/26/22 0959   Wound 07/15/22 Chest Lateral;Left I&D on 7/1/22 in ER for abscess   Date First Assessed/Time First Assessed: 07/15/22 0824   Present on Hospital Admission: Yes  Primary Wound Type: Surgical Type  Location: Chest  Wound Location Orientation: Lateral;Left  Wound Description (Comments): I&D on 7/1/22 in ER for abscess   Wound Image    Wound Etiology Surgical   Dressing Status Clean;Dry; Intact   Wound Cleansed Cleansed with saline   Dressing/Treatment Adhesive bandage   Wound Length (cm) 0.1 cm   Wound Width (cm) 0.2 cm   Wound Depth (cm) 0.1 cm   Wound Surface Area (cm^2) 0.02 cm^2   Change in Wound Size % (l*w) 99.29   Wound Volume (cm^3) 0.002 cm^3   Wound Healing % 100   Wound Assessment Pink/red   Drainage Amount Scant   Drainage Description Serosanguinous   Odor None   [place]inah-wound Assessment Intact   Pain Assessment   Pain Assessment None - Denies Pain

## 2022-08-26 NOTE — DISCHARGE INSTRUCTIONS
Return Appointment:   Discharge from wound center at this time        Instructions: Left Lateral Chest  Wound is essentially healed! Patient may shower as previously without wound covering. Apply Iodosorb to wound base and cover with band-aid for 1 week then stop. Wound should be completely healed at that time. Discharge from wound center at this time. If drainage noted or further problems arise, may call and make an appointment to return. Otherwise, no need to return. May use Hibiclens soap 1 time per month to prevent recurrence.

## 2022-08-26 NOTE — PROGRESS NOTES
[address]  Consult Note/H&P/Progress Note      Ry Adkins RECORD NUMBER:  [id number]  AGE: 62 y.o. RACE White (non-)  GENDER: female  : 1960  EPISODE DATE:  2022       Subjective:      CC (Chief Complaint) and HISTORY of PRESENT ILLNESS (HPI):    Fabián Montes is a 62 y.o. female who presents today for wound/ulcer evaluation. She saw me for her first visit at the wound center on 7/15/22. She was admitted by the hospitalists on 22 from the ER with bilateral breast cellulitis. She was s/p I&D of left axillary abscess done at an urgent treatment center in late 2022. She was placed on doxycycline but her infection progressed and she came to the ER where she was admitted by the hospitalist for IV antibiotics for progressive left breast and left cellulitis. She had an I&D performed by Dr. Delroy Payne on 7/3/2022 for MRSA left outer breast/ left axillary abscess  She has 2 surgical incisions with open wounds on the left side  No recent surgery on the right side  She was discharged on Bactrim and iodoform packing. She is not up-to-date with her mammograms as of 22. This information was obtained from the patient  The following HPI elements were documented for the patient's wound:  Location: Left breast wound  Duration: Since 7/3/2022  Severity: Moderate severity; grew MRSA  Context:    Modifying Factors:  Nothing makes better or worse  Quality:    Timing:     Associated Signs and Symptoms: No fevers      Ulcer Identification:  Ulcer Type: Infectious  Contributing Factors: No tobacco and no history of diabetes         22 CT chest  Hx: Left-sided breast pain. Bilateral axillary abscesses status post I and D 2 days prior. COMPARISON: Same day chest radiograph. Multiple axial images were obtained through the chest after intravenous injection of 80 mL of Isovue 370.   Radiation dose reduction techniques were used for this study: All CT scans performed at this facility use one or all of the following: Automated exposure control, adjustment of the mA and/or kVp according to patient's size, iterative reconstruction. FINDINGS: LUNGS/PLEURA: The central airways are patent. The lungs are within normal limits. No suspicious pulmonary nodules. No pleural effusion or pneumothorax. No focal pleural lesion. MEDIASTINUM: The thoracic aorta and main pulmonary artery are within normal limits. The heart is normal in size. No pericardial effusion. No suspicious thyroid nodule. The esophagus is normal. No pathologic adenopathy. BONES AND SOFT TISSUES: Bilateral axillary subcutaneous soft tissue fat stranding and small pockets of air consistent with recent bilateral axillary incision and drainages. There appears to be a small drain of the left axilla. There is soft tissue thickening without a clear organized fluid collection of the left or right axilla. There is asymmetric diffuse trabecular thickening and skin thickening with edematous change of the left breast, query mastitis. UPPER ABDOMEN: Unremarkable. Bilateral axillary subcutaneous soft tissue fat stranding and small pockets of air consistent with recent bilateral axillary incision and drainages. There appears to be a small drain of the left axilla. There is soft tissue thickening without a clear organized fluid collection of the left or right axilla. There is asymmetric diffuse trabecular thickening and skin thickening with edematous change of the left breast, query mastitis. Patient should be evaluated in the breast center if not already seen preferably a few days after antibiotic therapy within the next 2-3 weeks to ensure no evidence of inflammatory breast cancer              PAST MEDICAL HISTORY  History reviewed. No pertinent past medical history.      PAST SURGICAL HISTORY  Past Surgical History:   Procedure Laterality Date BREAST SURGERY Left 7/3/2022    BREAST INCISION AND DRAINAGE LEFT performed by Janna Henderson MD at [facility] SURGERY Left 2009       FAMILY HISTORY  History reviewed. No pertinent family history. SOCIAL HISTORY  Social History     Tobacco Use    Smoking status: Never    Smokeless tobacco: Never   Vaping Use    Vaping Use: Never used   Substance Use Topics    Alcohol use: Yes     Alcohol/week: 1.0 standard drink     Types: 1 Shots of liquor per week     Comment: Every 3 weeks    Drug use: Never       ALLERGIES  Allergies   Allergen Reactions    Bactrim [Sulfamethoxazole-Trimethoprim] Rash       MEDICATIONS  No current outpatient medications on file prior to encounter. No current facility-administered medications on file prior to encounter. REVIEW OF SYSTEMS  The patient has no difficulty with chest pain or shortness of breath. No fever or chills. Comprehensive review of systems was otherwise unremarkable except as noted above. Objective:   Physical Exam:   Recent vitals (if inpt):  Patient Vitals for the past 24 hrs:   BP Temp Temp src Pulse Resp Height Weight   08/26/22 0959 114/83 98.2 °F (36.8 °C) Oral (!) 103 18 5' 6\" (1.676 m) 134 lb (60.8 kg)             /83   Pulse (!) 103   Temp 98.2 °F (36.8 °C) (Oral)   Resp 18   Ht 5' 6\" (1.676 m)   Wt 134 lb (60.8 kg)   BMI 21.63 kg/m²   Wt Readings from Last 3 Encounters:   08/26/22 134 lb (60.8 kg)   08/19/22 135 lb (61.2 kg)   08/05/22 132 lb 12.8 oz (60.2 kg)     Constitutional: Alert, oriented, cooperative patient in no acute distress; appears stated age;  General appearance is within normal limits for wound care patient population. See the today's recorded vitals signs and constitutional data. Eyes: Pupils equal; Sclera are clear. EOMs intact; The eyes appear to track and move normally. The sclera are not injected. The conjunctive are clear. The eyelids are normal. There is no scleral icterus.    ENMT: There are no obvious external ear, nose, lip or mouth lesions. Nares normal; Neck: Overall contour of the neck is normal with no obvious neck masses. Gross hearing is within normal limits. No obvious neck masses  Resp:  Breathing is non-labored; normal rate and effort; no audible wheezing. No breast masses on either side. Left breast wound and left axillary wound as shown below    She has acute MRSA cellulitis and folliculitis of the RIGHT axillae  No axillary adenopathy        CV: RRR;  no JVD; No evidence of cyanosis of the upper extremities. The extremities are perfused without embolic sign, splinter hemorrhages, or petechia. GI: soft and non-distended without acute abnormality noted. Musculoskeletal: unremarkable with normal function. No embolic signs or cyanosis. Neuro:  Oriented; moves all 4; no focal deficits  Psychiatric: Judgement and insight are within normal limits for the wound care population of patients. Patient is oriented to person, place, and time. Recent and remote memory are within normal limits. Mood and affect are within normal limits. Integumentary (Skin/Wounds)  See inspection of wound(s) below. There are no other skin areas of palpable concern. See wound center documentation including photos in the [address] Wound Template made part of this record by reference. Wound Care Documentation:    Wound 07/15/22 Chest Lateral;Left I&D on 7/1/22 in ER for abscess (Active)   Wound Image   08/26/22 0959   Wound Etiology Surgical 08/26/22 0959   Dressing Status Clean;Dry; Intact 08/26/22 0959   Wound Cleansed Cleansed with saline 08/26/22 0959   Dressing/Treatment Adhesive bandage 08/26/22 0959   Wound Length (cm) 0.1 cm 08/26/22 0959   Wound Width (cm) 0.2 cm 08/26/22 0959   Wound Depth (cm) 0.1 cm 08/26/22 0959   Wound Surface Area (cm^2) 0.02 cm^2 08/26/22 0959   Change in Wound Size % (l*w) 99.29 08/26/22 0959   Wound Volume (cm^3) 0.002 cm^3 08/26/22 9511 Wound Healing % 100 08/26/22 0959   Undermining Starts ___ O'Clock 12 07/25/22 1048   Undermining Ends___ O'Clock 11 07/25/22 1048   Undermining Maxium Distance (cm) 0.7 07/25/22 1048   Wound Assessment Pink/red 08/26/22 0959   Drainage Amount Scant 08/26/22 0959   Drainage Description Serosanguinous 08/26/22 0959   Odor None 08/26/22 0959   Dinah-wound Assessment Intact 08/26/22 0959   Margins Attached edges; Defined edges 08/19/22 0913   Wound Thickness Description not for Pressure Injury Full thickness 08/19/22 0913   Number of days: 42                Wound 07/15/22 Chest Lateral;Left I&D on 7/1/22 in ER for abscess (Active)   Wound Image   08/26/22 0959   Wound Etiology Surgical 08/26/22 0959   Dressing Status Clean;Dry; Intact 08/26/22 0959   Wound Cleansed Cleansed with saline 08/26/22 0959   Dressing/Treatment Adhesive bandage 08/26/22 0959   Wound Length (cm) 0.1 cm 08/26/22 0959   Wound Width (cm) 0.2 cm 08/26/22 0959   Wound Depth (cm) 0.1 cm 08/26/22 0959   Wound Surface Area (cm^2) 0.02 cm^2 08/26/22 0959   Change in Wound Size % (l*w) 99.29 08/26/22 0959   Wound Volume (cm^3) 0.002 cm^3 08/26/22 0959   Wound Healing % 100 08/26/22 0959   Undermining Starts ___ O'Clock 12 07/25/22 1048   Undermining Ends___ O'Clock 11 07/25/22 1048   Undermining Maxium Distance (cm) 0.7 07/25/22 1048   Wound Assessment Pink/red 08/26/22 0959   Drainage Amount Scant 08/26/22 0959   Drainage Description Serosanguinous 08/26/22 0959   Odor None 08/26/22 0959   [place]-wound Assessment Intact 08/26/22 0959   Margins Attached edges; Defined edges 08/19/22 0913   Wound Thickness Description not for Pressure Injury Full thickness 08/19/22 0913   Number of days: 42                                        Amount and/or Complexity of Data Reviewed and Analyzed:  I reviewed and analyzed all of the unique labs and radiologic studies that are shown below as well as any that are in the HPI, and any that are in the expanded problem list below  *Each unique test, order, or document contributes to the combination of 2 or combination of 3 in Category 1 below. I also independently reviewed radiology images for studies I described above in the HPI or studies I have ordered. For this visit I also reviewed old records and prior notes. No results for input(s): WBC, HGB, PLT, NA, K, CL, CO2, BUN, CREA, GLU, INR, APTT, ALT, AML, AML, LCAD, PCO2, PO2, HCO3 in the last 72 hours. Invalid input(s): PTP, TBIL, TBILI, CBIL, SGOT, GPT, AP, LPSE, NH4, TROPT, TROIQ,  PH  No results for input(s): INR, APTT, ALT, AML in the last 72 hours. Invalid input(s): PTP, TBIL, TBILI, CBIL, SGOT, GPT, AP, LPSE    Most recent blood counts (CBC) review  Lab Results   Component Value Date    WBC 3.3 (L) 07/01/2022    HGB 10.8 (L) 07/01/2022    HCT 32.7 (L) 07/01/2022    MCV 86.5 07/01/2022     07/01/2022     Most recent chemistry (BMP) test review   Lab Results   Component Value Date/Time     07/02/2022 04:05 AM    K 3.7 07/02/2022 04:05 AM     07/02/2022 04:05 AM    CO2 27 07/02/2022 04:05 AM    BUN 14 07/02/2022 04:05 AM    CREATININE 0.60 07/02/2022 04:05 AM    GLUCOSE 82 07/02/2022 04:05 AM    CALCIUM 8.0 07/02/2022 04:05 AM      Most recent Liver enzyme test review  Lab Results   Component Value Date    ALT 31 07/02/2022    AST 43 (H) 07/02/2022    ALKPHOS 67 07/02/2022    BILITOT 0.4 07/02/2022     Most recent coagulation (coags) review  @lastinr@  No results found for: INR, APTT, AML, AML, LCAD    Diabetic assessment  No results found for: LABA1C  No results found for: EAG    Nutritional assessment screen to assess wound healing ability:  Lab Results   Component Value Date    LABALBU 2.3 (L) 07/02/2022     No results found for: TP, ALBR, ALB    Xray Result (most recent):  XR CHEST PORTABLE 07/01/2022    Narrative  EXAM: Single view chest radiograph. INDICATION: Left-sided breast pain. COMPARISON: None.     FINDINGS:  No focal lung consolidation. No pneumothorax. No pleural effusion. The heart is  normal in size. No evidence of acute osseous abnormality. Impression  1. No acute cardiopulmonary abnormality. CT Result (most recent):  CT CHEST W CONTRAST 07/01/2022    Narrative  EXAM: Chest CT with contrast.  INDICATION: Left-sided breast pain. Bilateral axillary abscesses status post I  and D 2 days prior. COMPARISON: Same day chest radiograph. Multiple axial images were obtained through the chest after intravenous  injection of 80 mL of Isovue 370. Radiation dose reduction techniques were used  for this study: All CT scans performed at this facility use one or all of the  following: Automated exposure control, adjustment of the mA and/or kVp according  to patient's size, iterative reconstruction. FINDINGS:  LUNGS/PLEURA:  The central airways are patent. The lungs are within normal limits. No  suspicious pulmonary nodules. No pleural effusion or pneumothorax. No focal  pleural lesion. MEDIASTINUM:  The thoracic aorta and main pulmonary artery are within normal limits. The heart  is normal in size. No pericardial effusion. No suspicious thyroid nodule. The  esophagus is normal. No pathologic adenopathy. BONES AND SOFT TISSUES:  Bilateral axillary subcutaneous soft tissue fat stranding and small pockets of  air consistent with recent bilateral axillary incision and drainages. There  appears to be a small drain of the left axilla. There is soft tissue thickening  without a clear organized fluid collection of the left or right axilla. There is  asymmetric diffuse trabecular thickening and skin thickening with edematous  change of the left breast, query mastitis. UPPER ABDOMEN:  Unremarkable. Impression  Bilateral axillary subcutaneous soft tissue fat stranding and small pockets of  air consistent with recent bilateral axillary incision and drainages. There  appears to be a small drain of the left axilla.  There is soft tissue thickening  without a clear organized fluid collection of the left or right axilla. There is  asymmetric diffuse trabecular thickening and skin thickening with edematous  change of the left breast, query mastitis. Patient should be evaluated in the  breast center if not already seen preferably a few days after antibiotic therapy  within the next 2-3 weeks to ensure no evidence of inflammatory breast cancer. Admission date (for inpatients): 8/26/2022   Day of Surgery  * No procedures listed *    Assessment:      Problem List Items Addressed This Visit    None          [unfilled]    Active Problems:    Cellulitis of left breast    Open wound of left axillary region    MRSA right axillary cellulitis    Open wound of left breast    Cellulitis of axilla, right    MRSA cellulitis  Resolved Problems:    * No resolved hospital problems. *      Patient Active Problem List    Diagnosis Date Noted    Open wound of left axillary region 07/15/2022     Priority: Medium    MRSA right axillary cellulitis 07/15/2022     Priority: Medium    Open wound of left breast 07/15/2022     Priority: Medium    Cellulitis of axilla, right 07/15/2022     Priority: Medium    MRSA cellulitis 07/15/2022     Priority: Medium    Cellulitis of left breast 07/01/2022     Priority: Medium           Plan:     Number and Complexity of Problems addressed and   Risks of complications and/or morbidity of management    Treatment Note please see attached Discharge Instructions  Any procedures done today in the wound center (if documented in a separate note) in [facility] are made part of this record by reference  Written patient dismissal instructions given to patient or POA.              MRSA infection of left breast and bilateral axilla   Surgical drainage not needed so far  I initially prescribed doxycycline q12hrs x 2 weeks  I considered Zyvox initially but she has no health insurance, Zyvox extremely expensive  The right axillary infection resolved with doxycycline but the left breast infection was not completely treated. Unfortunately she is allergic to Bactrim    I prescribed Zyvox on 7/29/22. She reported this prescription cost $1700 so she did not fill it. She instead filled the doxycycline prescription and was almost finished this prescription as of 8/5/2022. The wound cavity needs to be packed a little more frequently I would like her to try to participate in her wound care by doing this daily with a mirror. I demonstrated this to her while she was in the wound center. She has to pay for transportation to get to the wound center so we can change the dressing 3 times a week. She performed Hibiclens body scrub washes to decolonize given her recent history of MRSA. Her wounds have essentially healed as of 8/26/2022. She will see us as needed from here if this wound does not completely close or worsens or she has new issues      Left breast abscess I&D site  She was unable to pack this area on her own. She has no health insurance and is not homebound and therefore does not qualify for home health. She cannot afford home health  She has no family or friends with which we can teach. She has been coming to the wound center for packing changes 3 times a week but has to ride a bus to get here. She has no transportation. We will repack the wound with iodoform each visit  She is still quite tender which limits her ability to pack the wound as well as we would like. Left axillary abscess I&D site--> healed as of 7/22/2022. The orifice was quite small. Recommend 1/4\" iodoform packing into the site daily. Wound care supplies and teaching given. She was unable to pack it so she has been coming to the wound center qmon/wed/fri  She does not have health insurance to cover home health services     She will need bilateral screening mammograms when she has completely healed  to rule out breast malignancy.       Treatment significantly limited by social determinants of health. Ms. Tova Mccann has no health coverage and cannot afford home health services. In addition she lives alone and has no help. It is difficult for her to reach and see these areas to pack them but she is doing the best she can. This impairs our ability to achieve resolution of her wounds. She has no transportation and rides a bus or takes a lift when needed. She has limited access to healthcare because of lack of health insurance and this impairs her ability to infect wound healing. I would like for her to have the packing changed each day but she has no friends or family to help her and she cannot change the packing on her own. She cannot afford home health. Wound Care  No orders of the defined types were placed in this encounter. [unfilled]            Level of MDM (2/3 elements below)  Number and Complexity of Problems Addressed Amount and/or Complexity of Data to be Reviewed and Analyzed  *Each unique test, order, or document contributes to the combination of 2 or combination of 3 in Category 1 below. Risk of Complications and/or Morbidity or Mortality of pt Management     54797  45029 SF Minimal  ?1self-limited or minor problem Minimal or none Minimal risk of morbidity from additional diagnostic testing or Rx   67304  10408 Low Low  ? 2or more self-limited or minor problems;    or  ? 1stable chronic illness;    or  ?1acute, uncomplicated illness or injury   Limited  (Must meet the requirements of at least 1 of the 2 categories)  Category 1: Tests and documents   ? Any combination of 2 from the following:  ?Review of prior external note(s) from each unique source*;  ?review of the result(s) of each unique test*;   ?ordering of each unique test*    or   Category 2: Assessment requiring an independent historian(s)  (For the categories of independent interpretation of tests and discussion of management or test interpretation, see moderate or high) Low risk of morbidity from additional diagnostic testing or treatment     20770  61203 Mod Moderate  ? 1or more chronic illnesses with exacerbation, progression, or side effects of treatment;    or  ?2or more stable chronic illnesses     or  ? 1undiagnosed new problem with uncertain prognosis--recent left breast abscess with no history of mammograms. It is uncertain if she has breast neoplasm or pathology. She will need mammogram after the acute infection has resolved to clear the uncertainty. 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 ;    or  ?1acute illness with systemic symptoms;    or  ?1acute complicated injury   Moderate  (Must meet the requirements of at least 1 out of 3 categories)  Category 1: Tests, documents, or independent historian(s)  ? Any combination of 3 from the following:   ?Review of prior external note(s) from each unique source*;  ?Review of the result(s) of each unique test*;  ?Ordering of each unique test*;  ?Assessment requiring an independent historian(s)    or  Category 2: Independent interpretation of tests   ? Independent interpretation of a test performed by another physician/other qualified health care professional (not separately reported);     or  Category 3: Discussion of management or test interpretation  ? Discussion of management or test interpretation with external physician/other qualified health care professional/appropriate source (not separately reported)   Moderate risk of morbidity from additional diagnostic testing or treatment  Examples only:  ?Prescription drug management - advanced wound care prescription Rx wound care products--doxycyline and iodoform  (eg Iodosorb, hydrofera, silvadene, collagen, puracol plus, optiloc, biologics - placenta, Theraskin, Apligraf). Note also that if home health care is involved, they will not apply topical therapies without a prescription/order from physician      ? Decision regarding minor surgery with identified patient or procedure risk factors  ? Decision regarding elective major surgery without identified patient or procedure risk factors   ? Diagnosis or treatment significantly limited by social determinants of health       72889  13150 High High  ? 1or more chronic illnesses with severe exacerbation, progression, or side effects of treatment;    or  ?1 acute or chronic illness or injury that poses a threat to life or bodily function   Extensive  (Must meet the requirements of at least 2 out of 3 categories)  Category 1: Tests, documents, or independent historian(s)  ? Any combination of 3 from the following:   ?Review of prior external note(s) from each unique source*;  ?Review of the result(s) of each unique test*;   ?Ordering of each unique test*;   ?Assessment requiring an independent historian(s)    or   Category 2: Independent interpretation of tests   ? Independent interpretation of a test performed by another physician/other qualified health care professional (not separately reported);     or  Category 3: Discussion of management or test interpretation  ? Discussion of management or test interpretation with external physician/other qualified health care professional/appropriate source (not separately reported)   High risk of morbidity from additional diagnostic testing or treatment  Examples only:  ?Drug therapy requiring intensive monitoring for toxicity  ? Decision regarding elective major surgery with identified patient or procedure risk factors  ? Decision regarding emergency major surgery  ? Decision regarding hospitalization  ? Decision not to resuscitate or to de-escalate care because of poor prognosis                 I have personally performed a face-to-face diagnostic evaluation and management  service on this patient. I have independently seen the patient. I have independently obtained the above history from the patient/family. I have independently examined the patient with above findings.   I have independently reviewed data/labs for this patient and developed the above plan of care (MDM).       Electronically signed by Ritesh Schmitt MD on 8/26/2022 at 10:23 AM

## 2022-08-26 NOTE — WOUND CARE
Discharge Instructions for  Radha Harrell  [address] [place] [address] Nae Jerez, [address] [facility] [address]  Phone [phone]   Fax [phone]      NAME:  Franc Lindsey  YOB: 1960  MEDICAL RECORD NUMBER:  [id number]  DATE:  8/26/2022    Return Appointment:   Discharge from wound center at this time      Instructions: Left Lateral Chest  Wound is essentially healed! Patient may shower as previously without wound covering. Apply Iodosorb to wound base and cover with band-aid for 1 week then stop. Wound should be completely healed at that time. Discharge from wound center at this time. If drainage noted or further problems arise, may call and make an appointment to return. Otherwise, no need to return. May use Hibiclens soap 1 time per month to prevent recurrence. Should you experience increased redness, swelling, pain, foul odor, size of wound(s), or have a temperature over 101 degrees please contact the [address] [place] [address] Road at [phone] or if after hours contact your primary care physician or go to the hospital emergency department. PLEASE NOTE: IF YOU ARE UNABLE TO OBTAIN WOUND SUPPLIES, CONTINUE TO USE THE SUPPLIES YOU HAVE AVAILABLE UNTIL YOU ARE ABLE TO REACH US. IT IS MOST IMPORTANT TO KEEP THE WOUND COVERED AT ALL TIMES.     Electronically signed Nu Gama PT, [facility] on 8/26/2022 at 10:26 AM

## 2022-10-21 ENCOUNTER — HOSPITAL ENCOUNTER (EMERGENCY)
Age: 62
Discharge: HOME | End: 2022-10-21
Attending: EMERGENCY MEDICINE

## 2022-10-21 VITALS
HEART RATE: 93 BPM | WEIGHT: 130 LBS | OXYGEN SATURATION: 95 % | TEMPERATURE: 98.2 F | RESPIRATION RATE: 16 BRPM | SYSTOLIC BLOOD PRESSURE: 111 MMHG | DIASTOLIC BLOOD PRESSURE: 72 MMHG | HEIGHT: 66 IN | BODY MASS INDEX: 20.89 KG/M2

## 2022-10-21 DIAGNOSIS — N76.4 ABSCESS OF RIGHT GENITAL LABIA: Primary | ICD-10-CM

## 2022-10-21 PROCEDURE — 2500000003 HC RX 250 WO HCPCS: Performed by: NURSE PRACTITIONER

## 2022-10-21 PROCEDURE — 99283 EMERGENCY DEPT VISIT LOW MDM: CPT

## 2022-10-21 RX ORDER — HYDROCODONE BITARTRATE AND ACETAMINOPHEN 5; 325 MG/1; MG/1
1 TABLET ORAL EVERY 8 HOURS PRN
Qty: 10 TABLET | Refills: 0 | Status: SHIPPED | OUTPATIENT
Start: 2022-10-21 | End: 2022-10-24

## 2022-10-21 RX ORDER — LIDOCAINE HYDROCHLORIDE 10 MG/ML
5 INJECTION, SOLUTION INFILTRATION; PERINEURAL ONCE
Status: COMPLETED | OUTPATIENT
Start: 2022-10-21 | End: 2022-10-21

## 2022-10-21 RX ORDER — AMOXICILLIN AND CLAVULANATE POTASSIUM 875; 125 MG/1; MG/1
1 TABLET, FILM COATED ORAL 2 TIMES DAILY
Qty: 14 TABLET | Refills: 0 | Status: SHIPPED | OUTPATIENT
Start: 2022-10-21 | End: 2022-10-28

## 2022-10-21 RX ORDER — DOXYCYCLINE HYCLATE 100 MG
100 TABLET ORAL 2 TIMES DAILY
Qty: 14 TABLET | Refills: 0 | Status: SHIPPED | OUTPATIENT
Start: 2022-10-21 | End: 2022-10-28

## 2022-10-21 RX ADMIN — LIDOCAINE HYDROCHLORIDE 5 ML: 10 INJECTION, SOLUTION INFILTRATION; PERINEURAL at 21:51

## 2022-10-21 ASSESSMENT — PAIN DESCRIPTION - ORIENTATION: ORIENTATION: RIGHT

## 2022-10-21 ASSESSMENT — PAIN DESCRIPTION - DESCRIPTORS: DESCRIPTORS: SHARP

## 2022-10-21 ASSESSMENT — PAIN - FUNCTIONAL ASSESSMENT: PAIN_FUNCTIONAL_ASSESSMENT: 0-10

## 2022-10-21 ASSESSMENT — PAIN DESCRIPTION - LOCATION: LOCATION: GROIN

## 2022-10-21 ASSESSMENT — PAIN SCALES - GENERAL: PAINLEVEL_OUTOF10: 9

## 2022-10-21 NOTE — ED TRIAGE NOTES
Pt reports multiple abscesses on Right labia that started about 3 days ago. Pt reports using antibiotic ointment that helps alleviate the pain.

## 2022-10-22 NOTE — ED NOTES
I have reviewed discharge instructions with the patient. The patient verbalized understanding. Patient left ED via Discharge Method: ambulatory to Home. Opportunity for questions and clarification provided. Patient given 3 scripts. To continue your aftercare when you leave the hospital, you may receive an automated call from our care team to check in on how you are doing. This is a free service and part of our promise to provide the best care and service to meet your aftercare needs.  If you have questions, or wish to unsubscribe from this service please call [phone]. Thank you for Choosing our [facility] Emergency Department.         Modena Denver, RN  10/21/22 8140

## 2022-10-22 NOTE — ED PROVIDER NOTES
[employer] Emergency Department Provider Note                   PCP:                Юлия Dela Cruz, APRN - TERE               Age: 62 y.o. Sex: female       ICD-10-CM    1. Abscess of right genital labia  N76.4 HYDROcodone-acetaminophen (NORCO) 5-325 MG per tablet          DISPOSITION Decision To Discharge 10/21/2022 09:39:58 PM        MDM     HPI as charted. Pt neck is supple, no meningeal signs. Lungs are clear to auscultation, no diminished sounds. Abdomen is soft and not tender. Exam findings as below. I recommended  exam with swabs for gonorrhea, chlamydia, wet prep as well as urinalysis; which patient declined, requesting external exam only and incision and drainage. Procedure was performed as an procedure note, it was discontinued at the patient's request due to pain. Able to express moderate amount of of purulence material.  Not entirely consistent with a Bartholin's abscess, low suspicion of, chlamydia, pyelonephritis, cystitis, CA, other STI, Manish's gangrene, perianal abscess, other acute emergent process. Will prescribe antibiotics and obtain GYN for follow-up, advised to call on Monday to schedule as possible follow-up. Take antibiotics as instructed. Return for new or worsening symptoms. Therapeutic measures and irritated signs to watch left. Strict return to ED for care instruction provided. Advised to follow-up with PCP in 1-2 days. Return to ED immediately for any new, worsening, concerning symptoms. Patient is very well-hydrated appearing, no distress, pleasant and conversational.  Nontoxic-appearing, tolerating oral intake. Patient is hemodynamically stable and in no acute distress. Patient is not ill-appearing. All findings and plans were discussed with the patient. Patient verbalizes desire to be discharged home at this time. All questions answered.  Discussed with the patient that an unremarkable evaluation in the ED does not preclude the development or presence of a serious or life threatening condition. Patient was instructed to return immediately for any worsening or change in current symptoms, or if symptoms do not continue to improve. I instructed them to follow up with their primary care provider, own specialist, or medical provider that I am recommending for him within the next 2-3 days     the patient acknowledged understanding plan of care and affirmed approval. Patient is discharged home, with no further complaint. No orders of the defined types were placed in this encounter. Rishi Arce is a 62 y.o. female who presents to the Emergency Department with chief complaint of    Chief Complaint   Patient presents with    Abscess      HPI  62-year-old female presents to the ED with complaint of painful right labial lesion x4 days. Reports similar symptoms approximately 1 year ago, states symptoms resolved without intervention. Denies bleeding or drainage from site. Denies abnormal vaginal bleeding or discharge. Denies vaginal pain, other perineal pain, rectal pain, painful bowel movements, black or bloody stools, night sweats, recent unplanned weight loss, UTI symptoms. Denies fever/chills, change in appetite, weight loss, decreased oral intake, blurred vision, headaches, neck pain/stiffness. Alert & oriented x 3, afebrile, hemodynamically stable, non-toxic appearing, appears in no distress. Medical/surgical/social history reviewed with the patient. Review of Systems  Constitutional: Negative for fever. Negative for appetite change, chills, diaphoresis and unexpected weight change. HENT: As in HPI     Eyes: Negative. Respiratory: As in HPI   Cardiovascular: Negative. GI/: As in HPI      Musculoskeletal: As in HPI   Skin: Negative. Allergic/Immunologic: Negative. Neurological: Negative. No past medical history on file.      Past Surgical History:   Procedure Laterality Date    BREAST SURGERY Left 7/3/2022    BREAST INCISION AND DRAINAGE LEFT performed by Lashell Seaman MD at [address] Left 2009        No family history on file. Social History     Socioeconomic History    Marital status:    Tobacco Use    Smoking status: Never    Smokeless tobacco: Never   Vaping Use    Vaping Use: Never used   Substance and Sexual Activity    Alcohol use: Yes     Alcohol/week: 1.0 standard drink     Types: 1 Shots of liquor per week     Comment: Every 3 weeks    Drug use: Never    Sexual activity: Yes     Partners: Male     Social Determinants of Health     Financial Resource Strain: Low Risk     Difficulty of Paying Living Expenses: Not hard at all   Food Insecurity: No Food Insecurity    Worried About Running Out of Food in the Last Year: Never true    Ran Out of Food in the Last Year: Never true         Bactrim [sulfamethoxazole-trimethoprim]     Previous Medications    No medications on file        Vitals signs and nursing note reviewed. Patient Vitals for the past 4 hrs:   Temp Pulse Resp BP SpO2   10/21/22 1838 98.2 °F (36.8 °C) 93 16 111/72 95 %          Physical Exam   Constitutional: Oriented to person, place, and time. Appears well-developed and well-nourished. No distress. HENT:    Head: Normocephalic and atraumatic. Right Ear: External ear normal.    Left Ear: External ear normal.    Nose: Nose normal.    Mouth/Throat: Mouth normal. Uvula midline, no erythema/exudates/edema. No drooling, no voice change. No pain with ROM of neck. Eyes: Conjunctivae are normal.   Neck: Supple. No tracheal deviation. Not tender, not rigid, no menningeal signs. Cardiovascular: Normal rate, intact distal pulses. Pulmonary/Chest: No respiratory distress. Abdominal: Soft. No tenderness guarding rebound or rigidity. : Exam chaperoned by Carmen Horton, female RN. There is right labial fluctuance, no bleeding or drainage, no other lesion noted. No vaginal bleeding or discharge.   No other obvious abnormal finding. Musculoskeletal: No obvious deformity, erythema, edema. Neurological: Alert and oriented to person, place, and time. Skin:  No abrasion, no lesion, no petechiae and no rash noted. Not diaphoretic. No cyanosis, erythema, or pallor. Psychiatric: Normal mood and affect. Behavior is normal.    Nursing note and vitals reviewed. Procedures      Labs Reviewed - No data to display     No orders to display                          Voice dictation software was used during the making of this note. This software is not perfect and grammatical and other typographical errors may be present. This note has not been completely proofread for errors.          FÉLIX Rose - TERE  10/22/22 [id number]

## 2023-06-12 PROBLEM — D64.9 ANEMIA: Status: ACTIVE | Noted: 2023-06-12

## 2023-06-12 PROBLEM — A41.9 SEPSIS (HCC): Status: ACTIVE | Noted: 2023-06-12

## 2023-06-12 PROBLEM — J18.9 PNEUMONIA: Status: ACTIVE | Noted: 2023-06-12

## 2023-06-12 PROBLEM — R91.8 GROUND GLASS OPACITY PRESENT ON IMAGING OF LUNG: Status: ACTIVE | Noted: 2023-06-12

## 2023-06-14 PROBLEM — R06.2 WHEEZING: Status: ACTIVE | Noted: 2023-06-14

## 2023-06-21 ENCOUNTER — TELEPHONE (OUTPATIENT)
Dept: PULMONOLOGY | Age: 63
End: 2023-06-21

## 2023-06-21 ENCOUNTER — HOSPITAL ENCOUNTER (INPATIENT)
Age: 63
LOS: 4 days | Discharge: HOME | DRG: 189 | End: 2023-06-27
Attending: STUDENT IN AN ORGANIZED HEALTH CARE EDUCATION/TRAINING PROGRAM | Admitting: FAMILY MEDICINE
Payer: COMMERCIAL

## 2023-06-21 ENCOUNTER — APPOINTMENT (OUTPATIENT)
Dept: GENERAL RADIOLOGY | Age: 63
DRG: 189 | End: 2023-06-21
Payer: COMMERCIAL

## 2023-06-21 ENCOUNTER — APPOINTMENT (OUTPATIENT)
Dept: CT IMAGING | Age: 63
DRG: 189 | End: 2023-06-21
Payer: COMMERCIAL

## 2023-06-21 DIAGNOSIS — R09.02 HYPOXIA: ICD-10-CM

## 2023-06-21 DIAGNOSIS — N39.0 URINARY TRACT INFECTION WITHOUT HEMATURIA, SITE UNSPECIFIED: Primary | ICD-10-CM

## 2023-06-21 DIAGNOSIS — R53.1 GENERAL WEAKNESS: ICD-10-CM

## 2023-06-21 PROBLEM — D64.9 ANEMIA: Status: RESOLVED | Noted: 2023-06-12 | Resolved: 2023-06-21

## 2023-06-21 PROBLEM — A41.9 SEPSIS (HCC): Status: RESOLVED | Noted: 2023-06-12 | Resolved: 2023-06-21

## 2023-06-21 PROBLEM — R53.83 FATIGUE: Status: ACTIVE | Noted: 2023-06-21

## 2023-06-21 PROBLEM — S41.102A OPEN WOUND OF LEFT AXILLARY REGION: Status: RESOLVED | Noted: 2022-07-15 | Resolved: 2023-06-21

## 2023-06-21 PROBLEM — S21.002A OPEN WOUND OF LEFT BREAST: Status: RESOLVED | Noted: 2022-07-15 | Resolved: 2023-06-21

## 2023-06-21 PROBLEM — B95.62 CELLULITIS OF RIGHT FOOT DUE TO METHICILLIN-RESISTANT STAPHYLOCOCCUS AUREUS: Status: RESOLVED | Noted: 2022-07-15 | Resolved: 2023-06-21

## 2023-06-21 PROBLEM — N61.0 CELLULITIS OF LEFT BREAST: Status: RESOLVED | Noted: 2022-07-01 | Resolved: 2023-06-21

## 2023-06-21 PROBLEM — L03.111 CELLULITIS OF AXILLA, RIGHT: Status: RESOLVED | Noted: 2022-07-15 | Resolved: 2023-06-21

## 2023-06-21 PROBLEM — R79.89 ELEVATED LFTS: Status: ACTIVE | Noted: 2023-06-21

## 2023-06-21 PROBLEM — L03.115 CELLULITIS OF RIGHT FOOT DUE TO METHICILLIN-RESISTANT STAPHYLOCOCCUS AUREUS: Status: RESOLVED | Noted: 2022-07-15 | Resolved: 2023-06-21

## 2023-06-21 PROBLEM — J18.9 PNEUMONIA: Status: RESOLVED | Noted: 2023-06-12 | Resolved: 2023-06-21

## 2023-06-21 PROBLEM — B95.62 MRSA CELLULITIS: Status: RESOLVED | Noted: 2022-07-15 | Resolved: 2023-06-21

## 2023-06-21 PROBLEM — L03.90 MRSA CELLULITIS: Status: RESOLVED | Noted: 2022-07-15 | Resolved: 2023-06-21

## 2023-06-21 PROBLEM — R06.2 WHEEZING: Status: RESOLVED | Noted: 2023-06-14 | Resolved: 2023-06-21

## 2023-06-21 LAB
ALBUMIN SERPL-MCNC: 2.4 G/DL (ref 3.2–4.6)
ALBUMIN/GLOB SERPL: 0.6 (ref 0.4–1.6)
ALP SERPL-CCNC: 169 U/L (ref 50–136)
ALT SERPL-CCNC: 259 U/L (ref 12–65)
ANION GAP SERPL CALC-SCNC: 8 MMOL/L (ref 2–11)
APPEARANCE UR: CLEAR
AST SERPL-CCNC: 150 U/L (ref 15–37)
BACTERIA URNS QL MICRO: ABNORMAL /HPF
BASOPHILS # BLD: 0 K/UL (ref 0–0.2)
BASOPHILS NFR BLD: 0 % (ref 0–2)
BILIRUB SERPL-MCNC: 1.2 MG/DL (ref 0.2–1.1)
BILIRUB UR QL: NEGATIVE
BUN SERPL-MCNC: 17 MG/DL (ref 8–23)
CALCIUM SERPL-MCNC: 8.5 MG/DL (ref 8.3–10.4)
CHLORIDE SERPL-SCNC: 97 MMOL/L (ref 101–110)
CO2 SERPL-SCNC: 26 MMOL/L (ref 21–32)
COLOR UR: ABNORMAL
CREAT SERPL-MCNC: 0.5 MG/DL (ref 0.6–1)
DIFFERENTIAL METHOD BLD: ABNORMAL
EKG ATRIAL RATE: 92 BPM
EKG DIAGNOSIS: NORMAL
EKG P AXIS: 37 DEGREES
EKG P-R INTERVAL: 157 MS
EKG Q-T INTERVAL: 344 MS
EKG QRS DURATION: 85 MS
EKG QTC CALCULATION (BAZETT): 424 MS
EKG R AXIS: 4 DEGREES
EKG T AXIS: 30 DEGREES
EKG VENTRICULAR RATE: 91 BPM
EOSINOPHIL # BLD: 0.2 K/UL (ref 0–0.8)
EOSINOPHIL NFR BLD: 1 % (ref 0.5–7.8)
ERYTHROCYTE [DISTWIDTH] IN BLOOD BY AUTOMATED COUNT: 17.3 % (ref 11.9–14.6)
GLOBULIN SER CALC-MCNC: 3.8 G/DL (ref 2.8–4.5)
GLUCOSE SERPL-MCNC: 61 MG/DL (ref 65–100)
GLUCOSE UR STRIP.AUTO-MCNC: NEGATIVE MG/DL
HCT VFR BLD AUTO: 33.8 % (ref 35.8–46.3)
HGB BLD-MCNC: 11.5 G/DL (ref 11.7–15.4)
HGB UR QL STRIP: NEGATIVE
IMM GRANULOCYTES # BLD AUTO: 0.5 K/UL (ref 0–0.5)
IMM GRANULOCYTES NFR BLD AUTO: 4 % (ref 0–5)
KETONES UR QL STRIP.AUTO: 40 MG/DL
LACTATE SERPL-SCNC: 0.7 MMOL/L (ref 0.4–2)
LEUKOCYTE ESTERASE UR QL STRIP.AUTO: ABNORMAL
LYMPHOCYTES # BLD: 0.2 K/UL (ref 0.5–4.6)
LYMPHOCYTES NFR BLD: 2 % (ref 13–44)
MAGNESIUM SERPL-MCNC: 2.1 MG/DL (ref 1.8–2.4)
MCH RBC QN AUTO: 29.6 PG (ref 26.1–32.9)
MCHC RBC AUTO-ENTMCNC: 34 G/DL (ref 31.4–35)
MCV RBC AUTO: 86.9 FL (ref 82–102)
MONOCYTES # BLD: 0.6 K/UL (ref 0.1–1.3)
MONOCYTES NFR BLD: 5 % (ref 4–12)
NEUTS SEG # BLD: 10.6 K/UL (ref 1.7–8.2)
NEUTS SEG NFR BLD: 88 % (ref 43–78)
NITRITE UR QL STRIP.AUTO: NEGATIVE
NRBC # BLD: 0 K/UL (ref 0–0.2)
NT PRO BNP: 531 PG/ML (ref 5–125)
PH UR STRIP: 7.5 (ref 5–9)
PLATELET # BLD AUTO: 383 K/UL (ref 150–450)
PMV BLD AUTO: 9.8 FL (ref 9.4–12.3)
POTASSIUM SERPL-SCNC: 3.7 MMOL/L (ref 3.5–5.1)
PROCALCITONIN SERPL-MCNC: 0.26 NG/ML (ref 0–0.49)
PROT SERPL-MCNC: 6.2 G/DL (ref 6.3–8.2)
PROT UR STRIP-MCNC: 30 MG/DL
RBC # BLD AUTO: 3.89 M/UL (ref 4.05–5.2)
SARS-COV-2 RDRP RESP QL NAA+PROBE: NOT DETECTED
SODIUM SERPL-SCNC: 131 MMOL/L (ref 133–143)
SOURCE: NORMAL
SP GR UR REFRACTOMETRY: 1.02 (ref 1–1.02)
TROPONIN I SERPL HS-MCNC: 3.2 PG/ML (ref 0–37)
UROBILINOGEN UR QL STRIP.AUTO: 1 EU/DL (ref 0.2–1)
WBC # BLD AUTO: 12.1 K/UL (ref 4.3–11.1)
WBC URNS QL MICRO: ABNORMAL /HPF

## 2023-06-21 PROCEDURE — 84145 PROCALCITONIN (PCT): CPT

## 2023-06-21 PROCEDURE — 83605 ASSAY OF LACTIC ACID: CPT

## 2023-06-21 PROCEDURE — 93010 ELECTROCARDIOGRAM REPORT: CPT | Performed by: INTERNAL MEDICINE

## 2023-06-21 PROCEDURE — 71046 X-RAY EXAM CHEST 2 VIEWS: CPT

## 2023-06-21 PROCEDURE — 81001 URINALYSIS AUTO W/SCOPE: CPT

## 2023-06-21 PROCEDURE — 93005 ELECTROCARDIOGRAM TRACING: CPT | Performed by: STUDENT IN AN ORGANIZED HEALTH CARE EDUCATION/TRAINING PROGRAM

## 2023-06-21 PROCEDURE — 80053 COMPREHEN METABOLIC PANEL: CPT

## 2023-06-21 PROCEDURE — 96365 THER/PROPH/DIAG IV INF INIT: CPT

## 2023-06-21 PROCEDURE — 84484 ASSAY OF TROPONIN QUANT: CPT

## 2023-06-21 PROCEDURE — 83880 ASSAY OF NATRIURETIC PEPTIDE: CPT

## 2023-06-21 PROCEDURE — 2580000003 HC RX 258: Performed by: STUDENT IN AN ORGANIZED HEALTH CARE EDUCATION/TRAINING PROGRAM

## 2023-06-21 PROCEDURE — 87635 SARS-COV-2 COVID-19 AMP PRB: CPT

## 2023-06-21 PROCEDURE — 6360000004 HC RX CONTRAST MEDICATION: Performed by: STUDENT IN AN ORGANIZED HEALTH CARE EDUCATION/TRAINING PROGRAM

## 2023-06-21 PROCEDURE — 99285 EMERGENCY DEPT VISIT HI MDM: CPT

## 2023-06-21 PROCEDURE — 87040 BLOOD CULTURE FOR BACTERIA: CPT

## 2023-06-21 PROCEDURE — 71260 CT THORAX DX C+: CPT

## 2023-06-21 PROCEDURE — 83735 ASSAY OF MAGNESIUM: CPT

## 2023-06-21 PROCEDURE — 96375 TX/PRO/DX INJ NEW DRUG ADDON: CPT

## 2023-06-21 PROCEDURE — 6360000002 HC RX W HCPCS: Performed by: STUDENT IN AN ORGANIZED HEALTH CARE EDUCATION/TRAINING PROGRAM

## 2023-06-21 PROCEDURE — 85025 COMPLETE CBC W/AUTO DIFF WBC: CPT

## 2023-06-21 RX ORDER — SODIUM CHLORIDE 0.9 % (FLUSH) 0.9 %
10 SYRINGE (ML) INJECTION
Status: DISCONTINUED | OUTPATIENT
Start: 2023-06-21 | End: 2023-06-27 | Stop reason: HOSPADM

## 2023-06-21 RX ORDER — 0.9 % SODIUM CHLORIDE 0.9 %
1000 INTRAVENOUS SOLUTION INTRAVENOUS
Status: COMPLETED | OUTPATIENT
Start: 2023-06-21 | End: 2023-06-22

## 2023-06-21 RX ORDER — 0.9 % SODIUM CHLORIDE 0.9 %
100 INTRAVENOUS SOLUTION INTRAVENOUS
Status: DISCONTINUED | OUTPATIENT
Start: 2023-06-21 | End: 2023-06-27 | Stop reason: HOSPADM

## 2023-06-21 RX ADMIN — IOPAMIDOL 100 ML: 755 INJECTION, SOLUTION INTRAVENOUS at 22:06

## 2023-06-21 RX ADMIN — SODIUM CHLORIDE 1000 ML: 9 INJECTION, SOLUTION INTRAVENOUS at 23:18

## 2023-06-21 RX ADMIN — CEFTRIAXONE 1000 MG: 1 INJECTION, POWDER, FOR SOLUTION INTRAMUSCULAR; INTRAVENOUS at 23:18

## 2023-06-21 ASSESSMENT — ENCOUNTER SYMPTOMS: SHORTNESS OF BREATH: 1

## 2023-06-22 ENCOUNTER — APPOINTMENT (OUTPATIENT)
Dept: CT IMAGING | Age: 63
DRG: 189 | End: 2023-06-22
Payer: COMMERCIAL

## 2023-06-22 ENCOUNTER — TELEPHONE (OUTPATIENT)
Dept: PULMONOLOGY | Age: 63
End: 2023-06-22

## 2023-06-22 ENCOUNTER — APPOINTMENT (OUTPATIENT)
Dept: ULTRASOUND IMAGING | Age: 63
DRG: 189 | End: 2023-06-22
Payer: COMMERCIAL

## 2023-06-22 ENCOUNTER — TELEPHONE (OUTPATIENT)
Dept: INTERNAL MEDICINE CLINIC | Facility: CLINIC | Age: 63
End: 2023-06-22

## 2023-06-22 LAB
ANION GAP SERPL CALC-SCNC: 6 MMOL/L (ref 2–11)
BASOPHILS # BLD: 0 K/UL (ref 0–0.2)
BASOPHILS NFR BLD: 0 % (ref 0–2)
BUN SERPL-MCNC: 17 MG/DL (ref 8–23)
CALCIUM SERPL-MCNC: 8 MG/DL (ref 8.3–10.4)
CHLORIDE SERPL-SCNC: 99 MMOL/L (ref 101–110)
CO2 SERPL-SCNC: 26 MMOL/L (ref 21–32)
CREAT SERPL-MCNC: 0.6 MG/DL (ref 0.6–1)
DIFFERENTIAL METHOD BLD: ABNORMAL
EOSINOPHIL # BLD: 0.2 K/UL (ref 0–0.8)
EOSINOPHIL NFR BLD: 2 % (ref 0.5–7.8)
ERYTHROCYTE [DISTWIDTH] IN BLOOD BY AUTOMATED COUNT: 17.2 % (ref 11.9–14.6)
GLUCOSE SERPL-MCNC: 99 MG/DL (ref 65–100)
HAV IGM SER QL: NONREACTIVE
HBV CORE IGM SER QL: NONREACTIVE
HBV SURFACE AG SER QL: NONREACTIVE
HCT VFR BLD AUTO: 31.9 % (ref 35.8–46.3)
HCV AB SER QL: NONREACTIVE
HGB BLD-MCNC: 10.6 G/DL (ref 11.7–15.4)
IMM GRANULOCYTES # BLD AUTO: 0.4 K/UL (ref 0–0.5)
IMM GRANULOCYTES NFR BLD AUTO: 3 % (ref 0–5)
LIPASE SERPL-CCNC: 317 U/L (ref 73–393)
LYMPHOCYTES # BLD: 0.2 K/UL (ref 0.5–4.6)
LYMPHOCYTES NFR BLD: 1 % (ref 13–44)
MCH RBC QN AUTO: 29.5 PG (ref 26.1–32.9)
MCHC RBC AUTO-ENTMCNC: 33.2 G/DL (ref 31.4–35)
MCV RBC AUTO: 88.9 FL (ref 82–102)
MONOCYTES # BLD: 0.4 K/UL (ref 0.1–1.3)
MONOCYTES NFR BLD: 3 % (ref 4–12)
NEUTS SEG # BLD: 10.8 K/UL (ref 1.7–8.2)
NEUTS SEG NFR BLD: 91 % (ref 43–78)
NRBC # BLD: 0 K/UL (ref 0–0.2)
PLATELET # BLD AUTO: 358 K/UL (ref 150–450)
PMV BLD AUTO: 9.9 FL (ref 9.4–12.3)
POTASSIUM SERPL-SCNC: 3.6 MMOL/L (ref 3.5–5.1)
RBC # BLD AUTO: 3.59 M/UL (ref 4.05–5.2)
SODIUM SERPL-SCNC: 131 MMOL/L (ref 133–143)
WBC # BLD AUTO: 11.9 K/UL (ref 4.3–11.1)

## 2023-06-22 PROCEDURE — 96372 THER/PROPH/DIAG INJ SC/IM: CPT

## 2023-06-22 PROCEDURE — 2580000003 HC RX 258: Performed by: FAMILY MEDICINE

## 2023-06-22 PROCEDURE — 2700000000 HC OXYGEN THERAPY PER DAY

## 2023-06-22 PROCEDURE — G0378 HOSPITAL OBSERVATION PER HR: HCPCS

## 2023-06-22 PROCEDURE — 94640 AIRWAY INHALATION TREATMENT: CPT

## 2023-06-22 PROCEDURE — 96375 TX/PRO/DX INJ NEW DRUG ADDON: CPT

## 2023-06-22 PROCEDURE — 76705 ECHO EXAM OF ABDOMEN: CPT

## 2023-06-22 PROCEDURE — 36415 COLL VENOUS BLD VENIPUNCTURE: CPT

## 2023-06-22 PROCEDURE — 87088 URINE BACTERIA CULTURE: CPT

## 2023-06-22 PROCEDURE — 80074 ACUTE HEPATITIS PANEL: CPT

## 2023-06-22 PROCEDURE — 97161 PT EVAL LOW COMPLEX 20 MIN: CPT

## 2023-06-22 PROCEDURE — 97165 OT EVAL LOW COMPLEX 30 MIN: CPT

## 2023-06-22 PROCEDURE — 85025 COMPLETE CBC W/AUTO DIFF WBC: CPT

## 2023-06-22 PROCEDURE — 94760 N-INVAS EAR/PLS OXIMETRY 1: CPT

## 2023-06-22 PROCEDURE — 97530 THERAPEUTIC ACTIVITIES: CPT

## 2023-06-22 PROCEDURE — 83690 ASSAY OF LIPASE: CPT

## 2023-06-22 PROCEDURE — 80048 BASIC METABOLIC PNL TOTAL CA: CPT

## 2023-06-22 PROCEDURE — 2500000003 HC RX 250 WO HCPCS: Performed by: FAMILY MEDICINE

## 2023-06-22 PROCEDURE — 97535 SELF CARE MNGMENT TRAINING: CPT

## 2023-06-22 PROCEDURE — 6360000002 HC RX W HCPCS: Performed by: FAMILY MEDICINE

## 2023-06-22 PROCEDURE — 87086 URINE CULTURE/COLONY COUNT: CPT

## 2023-06-22 PROCEDURE — 87040 BLOOD CULTURE FOR BACTERIA: CPT

## 2023-06-22 PROCEDURE — 70450 CT HEAD/BRAIN W/O DYE: CPT

## 2023-06-22 PROCEDURE — 87186 SC STD MICRODIL/AGAR DIL: CPT

## 2023-06-22 PROCEDURE — 6370000000 HC RX 637 (ALT 250 FOR IP): Performed by: FAMILY MEDICINE

## 2023-06-22 RX ORDER — POTASSIUM CHLORIDE 7.45 MG/ML
10 INJECTION INTRAVENOUS PRN
Status: DISCONTINUED | OUTPATIENT
Start: 2023-06-22 | End: 2023-06-27 | Stop reason: HOSPADM

## 2023-06-22 RX ORDER — POLYETHYLENE GLYCOL 3350 17 G/17G
17 POWDER, FOR SOLUTION ORAL DAILY
Status: DISCONTINUED | OUTPATIENT
Start: 2023-06-22 | End: 2023-06-27 | Stop reason: HOSPADM

## 2023-06-22 RX ORDER — ACETAMINOPHEN 325 MG/1
650 TABLET ORAL EVERY 6 HOURS PRN
Status: DISCONTINUED | OUTPATIENT
Start: 2023-06-22 | End: 2023-06-27 | Stop reason: HOSPADM

## 2023-06-22 RX ORDER — ACETAMINOPHEN 650 MG/1
650 SUPPOSITORY RECTAL EVERY 6 HOURS PRN
Status: DISCONTINUED | OUTPATIENT
Start: 2023-06-22 | End: 2023-06-27 | Stop reason: HOSPADM

## 2023-06-22 RX ORDER — SODIUM CHLORIDE 0.9 % (FLUSH) 0.9 %
5-40 SYRINGE (ML) INJECTION EVERY 12 HOURS SCHEDULED
Status: DISCONTINUED | OUTPATIENT
Start: 2023-06-22 | End: 2023-06-27 | Stop reason: HOSPADM

## 2023-06-22 RX ORDER — MAGNESIUM SULFATE IN WATER 40 MG/ML
2000 INJECTION, SOLUTION INTRAVENOUS PRN
Status: DISCONTINUED | OUTPATIENT
Start: 2023-06-22 | End: 2023-06-27 | Stop reason: HOSPADM

## 2023-06-22 RX ORDER — LANOLIN ALCOHOL/MO/W.PET/CERES
3 CREAM (GRAM) TOPICAL NIGHTLY PRN
Status: DISCONTINUED | OUTPATIENT
Start: 2023-06-22 | End: 2023-06-27 | Stop reason: HOSPADM

## 2023-06-22 RX ORDER — POTASSIUM CHLORIDE 20 MEQ/1
40 TABLET, EXTENDED RELEASE ORAL PRN
Status: DISCONTINUED | OUTPATIENT
Start: 2023-06-22 | End: 2023-06-27 | Stop reason: HOSPADM

## 2023-06-22 RX ORDER — ONDANSETRON 2 MG/ML
4 INJECTION INTRAMUSCULAR; INTRAVENOUS EVERY 6 HOURS PRN
Status: DISCONTINUED | OUTPATIENT
Start: 2023-06-22 | End: 2023-06-27 | Stop reason: HOSPADM

## 2023-06-22 RX ORDER — SODIUM CHLORIDE 0.9 % (FLUSH) 0.9 %
5-40 SYRINGE (ML) INJECTION PRN
Status: DISCONTINUED | OUTPATIENT
Start: 2023-06-22 | End: 2023-06-27 | Stop reason: HOSPADM

## 2023-06-22 RX ORDER — ENOXAPARIN SODIUM 100 MG/ML
40 INJECTION SUBCUTANEOUS DAILY
Status: DISCONTINUED | OUTPATIENT
Start: 2023-06-22 | End: 2023-06-27 | Stop reason: HOSPADM

## 2023-06-22 RX ORDER — BUDESONIDE 0.5 MG/2ML
0.5 INHALANT ORAL 2 TIMES DAILY
Status: DISCONTINUED | OUTPATIENT
Start: 2023-06-22 | End: 2023-06-27 | Stop reason: HOSPADM

## 2023-06-22 RX ORDER — BISACODYL 5 MG/1
5 TABLET, DELAYED RELEASE ORAL DAILY PRN
Status: DISCONTINUED | OUTPATIENT
Start: 2023-06-22 | End: 2023-06-27 | Stop reason: HOSPADM

## 2023-06-22 RX ORDER — MONTELUKAST SODIUM 10 MG/1
10 TABLET ORAL NIGHTLY
Status: DISCONTINUED | OUTPATIENT
Start: 2023-06-22 | End: 2023-06-27 | Stop reason: HOSPADM

## 2023-06-22 RX ORDER — MAGNESIUM HYDROXIDE/ALUMINUM HYDROXICE/SIMETHICONE 120; 1200; 1200 MG/30ML; MG/30ML; MG/30ML
30 SUSPENSION ORAL EVERY 6 HOURS PRN
Status: DISCONTINUED | OUTPATIENT
Start: 2023-06-22 | End: 2023-06-27 | Stop reason: HOSPADM

## 2023-06-22 RX ORDER — PROMETHAZINE HYDROCHLORIDE 25 MG/1
12.5 TABLET ORAL EVERY 6 HOURS PRN
Status: DISCONTINUED | OUTPATIENT
Start: 2023-06-22 | End: 2023-06-27 | Stop reason: HOSPADM

## 2023-06-22 RX ORDER — SODIUM CHLORIDE 9 MG/ML
INJECTION, SOLUTION INTRAVENOUS PRN
Status: DISCONTINUED | OUTPATIENT
Start: 2023-06-22 | End: 2023-06-27 | Stop reason: HOSPADM

## 2023-06-22 RX ADMIN — TUBERCULIN PURIFIED PROTEIN DERIVATIVE 5 UNITS: 5 INJECTION, SOLUTION INTRADERMAL at 09:47

## 2023-06-22 RX ADMIN — ONDANSETRON 4 MG: 2 INJECTION INTRAMUSCULAR; INTRAVENOUS at 09:44

## 2023-06-22 RX ADMIN — SODIUM CHLORIDE, PRESERVATIVE FREE 10 ML: 5 INJECTION INTRAVENOUS at 09:43

## 2023-06-22 RX ADMIN — SODIUM CHLORIDE, PRESERVATIVE FREE 10 ML: 5 INJECTION INTRAVENOUS at 20:48

## 2023-06-22 RX ADMIN — POLYETHYLENE GLYCOL 3350 17 G: 17 POWDER, FOR SOLUTION ORAL at 09:44

## 2023-06-22 RX ADMIN — ENOXAPARIN SODIUM 40 MG: 100 INJECTION SUBCUTANEOUS at 09:44

## 2023-06-22 RX ADMIN — MONTELUKAST 10 MG: 10 TABLET, FILM COATED ORAL at 20:47

## 2023-06-22 RX ADMIN — BUDESONIDE INHALATION 500 MCG: 0.5 SUSPENSION RESPIRATORY (INHALATION) at 20:13

## 2023-06-22 ASSESSMENT — PAIN SCALES - GENERAL: PAINLEVEL_OUTOF10: 0

## 2023-06-23 PROBLEM — J96.01 ACUTE HYPOXEMIC RESPIRATORY FAILURE (HCC): Status: ACTIVE | Noted: 2023-06-23

## 2023-06-23 LAB
ALBUMIN SERPL-MCNC: 1.9 G/DL (ref 3.2–4.6)
ALBUMIN/GLOB SERPL: 0.7 (ref 0.4–1.6)
ALP SERPL-CCNC: 139 U/L (ref 50–136)
ALT SERPL-CCNC: 147 U/L (ref 12–65)
ANION GAP SERPL CALC-SCNC: 4 MMOL/L (ref 2–11)
AST SERPL-CCNC: 72 U/L (ref 15–37)
BASOPHILS # BLD: 0 K/UL (ref 0–0.2)
BASOPHILS NFR BLD: 0 % (ref 0–2)
BILIRUB DIRECT SERPL-MCNC: 0.2 MG/DL
BILIRUB SERPL-MCNC: 0.5 MG/DL (ref 0.2–1.1)
BUN SERPL-MCNC: 16 MG/DL (ref 8–23)
CALCIUM SERPL-MCNC: 8.3 MG/DL (ref 8.3–10.4)
CHLORIDE SERPL-SCNC: 101 MMOL/L (ref 101–110)
CO2 SERPL-SCNC: 27 MMOL/L (ref 21–32)
CREAT SERPL-MCNC: 0.5 MG/DL (ref 0.6–1)
DIFFERENTIAL METHOD BLD: ABNORMAL
EOSINOPHIL # BLD: 0.2 K/UL (ref 0–0.8)
EOSINOPHIL NFR BLD: 3 % (ref 0.5–7.8)
ERYTHROCYTE [DISTWIDTH] IN BLOOD BY AUTOMATED COUNT: 17.4 % (ref 11.9–14.6)
GLOBULIN SER CALC-MCNC: 2.8 G/DL (ref 2.8–4.5)
GLUCOSE SERPL-MCNC: 80 MG/DL (ref 65–100)
HCT VFR BLD AUTO: 28.6 % (ref 35.8–46.3)
HGB BLD-MCNC: 9.3 G/DL (ref 11.7–15.4)
IMM GRANULOCYTES # BLD AUTO: 0.2 K/UL (ref 0–0.5)
IMM GRANULOCYTES NFR BLD AUTO: 2 % (ref 0–5)
LYMPHOCYTES # BLD: 0.2 K/UL (ref 0.5–4.6)
LYMPHOCYTES NFR BLD: 3 % (ref 13–44)
MCH RBC QN AUTO: 29 PG (ref 26.1–32.9)
MCHC RBC AUTO-ENTMCNC: 32.5 G/DL (ref 31.4–35)
MCV RBC AUTO: 89.1 FL (ref 82–102)
MM INDURATION, POC: 0 MM (ref 0–5)
MONOCYTES # BLD: 0.2 K/UL (ref 0.1–1.3)
MONOCYTES NFR BLD: 3 % (ref 4–12)
NEUTS SEG # BLD: 6 K/UL (ref 1.7–8.2)
NEUTS SEG NFR BLD: 89 % (ref 43–78)
NRBC # BLD: 0 K/UL (ref 0–0.2)
PLATELET # BLD AUTO: 274 K/UL (ref 150–450)
PMV BLD AUTO: 10.2 FL (ref 9.4–12.3)
POTASSIUM SERPL-SCNC: 4.4 MMOL/L (ref 3.5–5.1)
PPD, POC: NEGATIVE
PROT SERPL-MCNC: 4.7 G/DL (ref 6.3–8.2)
RBC # BLD AUTO: 3.21 M/UL (ref 4.05–5.2)
SODIUM SERPL-SCNC: 132 MMOL/L (ref 133–143)
WBC # BLD AUTO: 6.8 K/UL (ref 4.3–11.1)

## 2023-06-23 PROCEDURE — 1100000000 HC RM PRIVATE

## 2023-06-23 PROCEDURE — 96372 THER/PROPH/DIAG INJ SC/IM: CPT

## 2023-06-23 PROCEDURE — 85025 COMPLETE CBC W/AUTO DIFF WBC: CPT

## 2023-06-23 PROCEDURE — 6360000002 HC RX W HCPCS: Performed by: FAMILY MEDICINE

## 2023-06-23 PROCEDURE — 2580000003 HC RX 258: Performed by: HOSPITALIST

## 2023-06-23 PROCEDURE — 6370000000 HC RX 637 (ALT 250 FOR IP): Performed by: FAMILY MEDICINE

## 2023-06-23 PROCEDURE — 94640 AIRWAY INHALATION TREATMENT: CPT

## 2023-06-23 PROCEDURE — 2580000003 HC RX 258: Performed by: FAMILY MEDICINE

## 2023-06-23 PROCEDURE — 97530 THERAPEUTIC ACTIVITIES: CPT

## 2023-06-23 PROCEDURE — 80076 HEPATIC FUNCTION PANEL: CPT

## 2023-06-23 PROCEDURE — 6360000002 HC RX W HCPCS: Performed by: HOSPITALIST

## 2023-06-23 PROCEDURE — 80048 BASIC METABOLIC PNL TOTAL CA: CPT

## 2023-06-23 PROCEDURE — 94760 N-INVAS EAR/PLS OXIMETRY 1: CPT

## 2023-06-23 PROCEDURE — 96365 THER/PROPH/DIAG IV INF INIT: CPT

## 2023-06-23 PROCEDURE — 36415 COLL VENOUS BLD VENIPUNCTURE: CPT

## 2023-06-23 PROCEDURE — 97116 GAIT TRAINING THERAPY: CPT

## 2023-06-23 RX ORDER — SODIUM CHLORIDE 9 MG/ML
INJECTION, SOLUTION INTRAVENOUS CONTINUOUS
Status: ACTIVE | OUTPATIENT
Start: 2023-06-23 | End: 2023-06-24

## 2023-06-23 RX ADMIN — MONTELUKAST 10 MG: 10 TABLET, FILM COATED ORAL at 20:56

## 2023-06-23 RX ADMIN — VANCOMYCIN HYDROCHLORIDE 1000 MG: 1 INJECTION, POWDER, LYOPHILIZED, FOR SOLUTION INTRAVENOUS at 16:37

## 2023-06-23 RX ADMIN — BUDESONIDE INHALATION 500 MCG: 0.5 SUSPENSION RESPIRATORY (INHALATION) at 07:47

## 2023-06-23 RX ADMIN — ENOXAPARIN SODIUM 40 MG: 100 INJECTION SUBCUTANEOUS at 09:26

## 2023-06-23 RX ADMIN — SODIUM CHLORIDE, PRESERVATIVE FREE 10 ML: 5 INJECTION INTRAVENOUS at 09:27

## 2023-06-23 RX ADMIN — SODIUM CHLORIDE: 9 INJECTION, SOLUTION INTRAVENOUS at 12:45

## 2023-06-23 RX ADMIN — POLYETHYLENE GLYCOL 3350 17 G: 17 POWDER, FOR SOLUTION ORAL at 09:26

## 2023-06-23 RX ADMIN — ACETAMINOPHEN 650 MG: 325 TABLET ORAL at 23:24

## 2023-06-23 RX ADMIN — BUDESONIDE INHALATION 500 MCG: 0.5 SUSPENSION RESPIRATORY (INHALATION) at 19:33

## 2023-06-23 NOTE — TELEPHONE ENCOUNTER
I called the pt, again, in an attempt to schedule the pt a hosp f/u.   However, the pt states she is still in the hospital.  She will call when she is discharged to schedule a f/u

## 2023-06-24 LAB
ALBUMIN SERPL-MCNC: 1.9 G/DL (ref 3.2–4.6)
ALBUMIN/GLOB SERPL: 0.5 (ref 0.4–1.6)
ALP SERPL-CCNC: 147 U/L (ref 50–136)
ALT SERPL-CCNC: 116 U/L (ref 12–65)
ANION GAP SERPL CALC-SCNC: 1 MMOL/L (ref 2–11)
AST SERPL-CCNC: 52 U/L (ref 15–37)
BACTERIA SPEC CULT: ABNORMAL
BASOPHILS # BLD: 0 K/UL (ref 0–0.2)
BASOPHILS NFR BLD: 0 % (ref 0–2)
BILIRUB DIRECT SERPL-MCNC: 0.1 MG/DL
BILIRUB SERPL-MCNC: 0.7 MG/DL (ref 0.2–1.1)
BUN SERPL-MCNC: 12 MG/DL (ref 8–23)
CALCIUM SERPL-MCNC: 8.4 MG/DL (ref 8.3–10.4)
CHLORIDE SERPL-SCNC: 107 MMOL/L (ref 101–110)
CO2 SERPL-SCNC: 26 MMOL/L (ref 21–32)
CREAT SERPL-MCNC: 0.4 MG/DL (ref 0.6–1)
DIFFERENTIAL METHOD BLD: ABNORMAL
EOSINOPHIL # BLD: 0.2 K/UL (ref 0–0.8)
EOSINOPHIL NFR BLD: 4 % (ref 0.5–7.8)
ERYTHROCYTE [DISTWIDTH] IN BLOOD BY AUTOMATED COUNT: 17.2 % (ref 11.9–14.6)
GLOBULIN SER CALC-MCNC: 3.6 G/DL (ref 2.8–4.5)
GLUCOSE SERPL-MCNC: 92 MG/DL (ref 65–100)
HCT VFR BLD AUTO: 29.3 % (ref 35.8–46.3)
HGB BLD-MCNC: 9.6 G/DL (ref 11.7–15.4)
IMM GRANULOCYTES # BLD AUTO: 0.1 K/UL (ref 0–0.5)
IMM GRANULOCYTES NFR BLD AUTO: 2 % (ref 0–5)
LYMPHOCYTES # BLD: 0.3 K/UL (ref 0.5–4.6)
LYMPHOCYTES NFR BLD: 5 % (ref 13–44)
MCH RBC QN AUTO: 29.7 PG (ref 26.1–32.9)
MCHC RBC AUTO-ENTMCNC: 32.8 G/DL (ref 31.4–35)
MCV RBC AUTO: 90.7 FL (ref 82–102)
MM INDURATION, POC: 0 MM (ref 0–5)
MONOCYTES # BLD: 0.2 K/UL (ref 0.1–1.3)
MONOCYTES NFR BLD: 3 % (ref 4–12)
NEUTS SEG # BLD: 4.3 K/UL (ref 1.7–8.2)
NEUTS SEG NFR BLD: 86 % (ref 43–78)
NRBC # BLD: 0 K/UL (ref 0–0.2)
PLATELET # BLD AUTO: 231 K/UL (ref 150–450)
PMV BLD AUTO: 10.3 FL (ref 9.4–12.3)
POTASSIUM SERPL-SCNC: 4.5 MMOL/L (ref 3.5–5.1)
PPD, POC: NEGATIVE
PROT SERPL-MCNC: 5.5 G/DL (ref 6.3–8.2)
RBC # BLD AUTO: 3.23 M/UL (ref 4.05–5.2)
SERVICE CMNT-IMP: ABNORMAL
SODIUM SERPL-SCNC: 134 MMOL/L (ref 133–143)
WBC # BLD AUTO: 5.1 K/UL (ref 4.3–11.1)

## 2023-06-24 PROCEDURE — 94640 AIRWAY INHALATION TREATMENT: CPT

## 2023-06-24 PROCEDURE — 80048 BASIC METABOLIC PNL TOTAL CA: CPT

## 2023-06-24 PROCEDURE — 2580000003 HC RX 258: Performed by: FAMILY MEDICINE

## 2023-06-24 PROCEDURE — 94760 N-INVAS EAR/PLS OXIMETRY 1: CPT

## 2023-06-24 PROCEDURE — 36415 COLL VENOUS BLD VENIPUNCTURE: CPT

## 2023-06-24 PROCEDURE — 6360000002 HC RX W HCPCS: Performed by: HOSPITALIST

## 2023-06-24 PROCEDURE — 6370000000 HC RX 637 (ALT 250 FOR IP): Performed by: HOSPITALIST

## 2023-06-24 PROCEDURE — 6360000002 HC RX W HCPCS: Performed by: FAMILY MEDICINE

## 2023-06-24 PROCEDURE — 85025 COMPLETE CBC W/AUTO DIFF WBC: CPT

## 2023-06-24 PROCEDURE — 1100000000 HC RM PRIVATE

## 2023-06-24 PROCEDURE — 2700000000 HC OXYGEN THERAPY PER DAY

## 2023-06-24 PROCEDURE — 80076 HEPATIC FUNCTION PANEL: CPT

## 2023-06-24 PROCEDURE — 2580000003 HC RX 258: Performed by: HOSPITALIST

## 2023-06-24 PROCEDURE — 6370000000 HC RX 637 (ALT 250 FOR IP): Performed by: FAMILY MEDICINE

## 2023-06-24 RX ORDER — LEVOFLOXACIN 500 MG/1
500 TABLET, FILM COATED ORAL DAILY
Status: COMPLETED | OUTPATIENT
Start: 2023-06-24 | End: 2023-06-26

## 2023-06-24 RX ADMIN — VANCOMYCIN HYDROCHLORIDE 1000 MG: 1 INJECTION, POWDER, LYOPHILIZED, FOR SOLUTION INTRAVENOUS at 04:46

## 2023-06-24 RX ADMIN — LEVOFLOXACIN 500 MG: 500 TABLET, FILM COATED ORAL at 09:42

## 2023-06-24 RX ADMIN — POLYETHYLENE GLYCOL 3350 17 G: 17 POWDER, FOR SOLUTION ORAL at 09:07

## 2023-06-24 RX ADMIN — BUDESONIDE INHALATION 500 MCG: 0.5 SUSPENSION RESPIRATORY (INHALATION) at 20:27

## 2023-06-24 RX ADMIN — MONTELUKAST 10 MG: 10 TABLET, FILM COATED ORAL at 21:17

## 2023-06-24 RX ADMIN — SODIUM CHLORIDE: 9 INJECTION, SOLUTION INTRAVENOUS at 04:46

## 2023-06-24 RX ADMIN — BUDESONIDE INHALATION 500 MCG: 0.5 SUSPENSION RESPIRATORY (INHALATION) at 07:34

## 2023-06-24 RX ADMIN — SODIUM CHLORIDE, PRESERVATIVE FREE 10 ML: 5 INJECTION INTRAVENOUS at 21:18

## 2023-06-24 RX ADMIN — ENOXAPARIN SODIUM 40 MG: 100 INJECTION SUBCUTANEOUS at 09:07

## 2023-06-25 LAB
ANION GAP SERPL CALC-SCNC: 1 MMOL/L (ref 2–11)
ANION GAP SERPL CALC-SCNC: 4 MMOL/L (ref 2–11)
BASOPHILS # BLD: 0 K/UL (ref 0–0.2)
BASOPHILS NFR BLD: 0 % (ref 0–2)
BUN SERPL-MCNC: 15 MG/DL (ref 8–23)
BUN SERPL-MCNC: 17 MG/DL (ref 8–23)
CALCIUM SERPL-MCNC: 8.3 MG/DL (ref 8.3–10.4)
CALCIUM SERPL-MCNC: 8.7 MG/DL (ref 8.3–10.4)
CHLORIDE SERPL-SCNC: 99 MMOL/L (ref 101–110)
CHLORIDE SERPL-SCNC: 99 MMOL/L (ref 101–110)
CO2 SERPL-SCNC: 27 MMOL/L (ref 21–32)
CO2 SERPL-SCNC: 28 MMOL/L (ref 21–32)
CREAT SERPL-MCNC: 0.4 MG/DL (ref 0.6–1)
CREAT SERPL-MCNC: 0.5 MG/DL (ref 0.6–1)
DIFFERENTIAL METHOD BLD: ABNORMAL
EOSINOPHIL # BLD: 0.2 K/UL (ref 0–0.8)
EOSINOPHIL NFR BLD: 4 % (ref 0.5–7.8)
ERYTHROCYTE [DISTWIDTH] IN BLOOD BY AUTOMATED COUNT: 17.2 % (ref 11.9–14.6)
GLUCOSE SERPL-MCNC: 125 MG/DL (ref 65–100)
GLUCOSE SERPL-MCNC: 82 MG/DL (ref 65–100)
HCT VFR BLD AUTO: 30.3 % (ref 35.8–46.3)
HGB BLD-MCNC: 9.9 G/DL (ref 11.7–15.4)
IMM GRANULOCYTES # BLD AUTO: 0.1 K/UL (ref 0–0.5)
IMM GRANULOCYTES NFR BLD AUTO: 1 % (ref 0–5)
LYMPHOCYTES # BLD: 0.3 K/UL (ref 0.5–4.6)
LYMPHOCYTES NFR BLD: 6 % (ref 13–44)
MCH RBC QN AUTO: 29.6 PG (ref 26.1–32.9)
MCHC RBC AUTO-ENTMCNC: 32.7 G/DL (ref 31.4–35)
MCV RBC AUTO: 90.7 FL (ref 82–102)
MONOCYTES # BLD: 0.3 K/UL (ref 0.1–1.3)
MONOCYTES NFR BLD: 5 % (ref 4–12)
NEUTS SEG # BLD: 4.8 K/UL (ref 1.7–8.2)
NEUTS SEG NFR BLD: 84 % (ref 43–78)
NRBC # BLD: 0 K/UL (ref 0–0.2)
PLATELET # BLD AUTO: 251 K/UL (ref 150–450)
PMV BLD AUTO: 10.1 FL (ref 9.4–12.3)
POTASSIUM SERPL-SCNC: 4.2 MMOL/L (ref 3.5–5.1)
POTASSIUM SERPL-SCNC: 4.4 MMOL/L (ref 3.5–5.1)
RBC # BLD AUTO: 3.34 M/UL (ref 4.05–5.2)
SODIUM SERPL-SCNC: 128 MMOL/L (ref 133–143)
SODIUM SERPL-SCNC: 130 MMOL/L (ref 133–143)
WBC # BLD AUTO: 5.7 K/UL (ref 4.3–11.1)

## 2023-06-25 PROCEDURE — 6370000000 HC RX 637 (ALT 250 FOR IP): Performed by: HOSPITALIST

## 2023-06-25 PROCEDURE — 2580000003 HC RX 258: Performed by: FAMILY MEDICINE

## 2023-06-25 PROCEDURE — 2700000000 HC OXYGEN THERAPY PER DAY

## 2023-06-25 PROCEDURE — 6360000002 HC RX W HCPCS: Performed by: FAMILY MEDICINE

## 2023-06-25 PROCEDURE — 94640 AIRWAY INHALATION TREATMENT: CPT

## 2023-06-25 PROCEDURE — 94760 N-INVAS EAR/PLS OXIMETRY 1: CPT

## 2023-06-25 PROCEDURE — 36415 COLL VENOUS BLD VENIPUNCTURE: CPT

## 2023-06-25 PROCEDURE — 1100000000 HC RM PRIVATE

## 2023-06-25 PROCEDURE — 85025 COMPLETE CBC W/AUTO DIFF WBC: CPT

## 2023-06-25 PROCEDURE — 80048 BASIC METABOLIC PNL TOTAL CA: CPT

## 2023-06-25 PROCEDURE — 6370000000 HC RX 637 (ALT 250 FOR IP): Performed by: FAMILY MEDICINE

## 2023-06-25 RX ORDER — DOXYCYCLINE HYCLATE 100 MG/1
100 CAPSULE ORAL EVERY 12 HOURS SCHEDULED
Status: DISCONTINUED | OUTPATIENT
Start: 2023-06-25 | End: 2023-06-27 | Stop reason: HOSPADM

## 2023-06-25 RX ADMIN — DOXYCYCLINE HYCLATE 100 MG: 100 CAPSULE ORAL at 11:32

## 2023-06-25 RX ADMIN — SODIUM CHLORIDE, PRESERVATIVE FREE 10 ML: 5 INJECTION INTRAVENOUS at 20:21

## 2023-06-25 RX ADMIN — BUDESONIDE INHALATION 500 MCG: 0.5 SUSPENSION RESPIRATORY (INHALATION) at 20:55

## 2023-06-25 RX ADMIN — BUDESONIDE INHALATION 500 MCG: 0.5 SUSPENSION RESPIRATORY (INHALATION) at 07:46

## 2023-06-25 RX ADMIN — ENOXAPARIN SODIUM 40 MG: 100 INJECTION SUBCUTANEOUS at 08:57

## 2023-06-25 RX ADMIN — DOXYCYCLINE HYCLATE 100 MG: 100 CAPSULE ORAL at 20:20

## 2023-06-25 RX ADMIN — SODIUM CHLORIDE, PRESERVATIVE FREE 10 ML: 5 INJECTION INTRAVENOUS at 08:58

## 2023-06-25 RX ADMIN — ACETAMINOPHEN 650 MG: 325 TABLET ORAL at 20:20

## 2023-06-25 RX ADMIN — MONTELUKAST 10 MG: 10 TABLET, FILM COATED ORAL at 20:20

## 2023-06-25 RX ADMIN — POLYETHYLENE GLYCOL 3350 17 G: 17 POWDER, FOR SOLUTION ORAL at 08:57

## 2023-06-25 RX ADMIN — LEVOFLOXACIN 500 MG: 500 TABLET, FILM COATED ORAL at 08:57

## 2023-06-25 ASSESSMENT — PAIN SCALES - GENERAL: PAINLEVEL_OUTOF10: 0

## 2023-06-26 ENCOUNTER — CLINICAL DOCUMENTATION (OUTPATIENT)
Dept: PULMONOLOGY | Age: 63
End: 2023-06-26

## 2023-06-26 LAB
ALBUMIN SERPL-MCNC: 2.3 G/DL (ref 3.2–4.6)
ALBUMIN/GLOB SERPL: 0.5 (ref 0.4–1.6)
ALP SERPL-CCNC: 178 U/L (ref 50–136)
ALT SERPL-CCNC: 105 U/L (ref 12–65)
ANION GAP SERPL CALC-SCNC: 0 MMOL/L (ref 2–11)
AST SERPL-CCNC: 80 U/L (ref 15–37)
BACTERIA SPEC CULT: NORMAL
BASOPHILS # BLD: 0 K/UL (ref 0–0.2)
BASOPHILS NFR BLD: 0 % (ref 0–2)
BILIRUB SERPL-MCNC: 0.5 MG/DL (ref 0.2–1.1)
BUN SERPL-MCNC: 16 MG/DL (ref 8–23)
CALCIUM SERPL-MCNC: 9.7 MG/DL (ref 8.3–10.4)
CHLORIDE SERPL-SCNC: 101 MMOL/L (ref 101–110)
CO2 SERPL-SCNC: 30 MMOL/L (ref 21–32)
CREAT SERPL-MCNC: 0.6 MG/DL (ref 0.6–1)
DIFFERENTIAL METHOD BLD: ABNORMAL
EOSINOPHIL # BLD: 0.1 K/UL (ref 0–0.8)
EOSINOPHIL NFR BLD: 2 % (ref 0.5–7.8)
ERYTHROCYTE [DISTWIDTH] IN BLOOD BY AUTOMATED COUNT: 17.4 % (ref 11.9–14.6)
GLOBULIN SER CALC-MCNC: 4.7 G/DL (ref 2.8–4.5)
GLUCOSE SERPL-MCNC: 85 MG/DL (ref 65–100)
HCT VFR BLD AUTO: 36.6 % (ref 35.8–46.3)
HGB BLD-MCNC: 12.1 G/DL (ref 11.7–15.4)
IMM GRANULOCYTES # BLD AUTO: 0.1 K/UL (ref 0–0.5)
IMM GRANULOCYTES NFR BLD AUTO: 2 % (ref 0–5)
LYMPHOCYTES # BLD: 0.4 K/UL (ref 0.5–4.6)
LYMPHOCYTES NFR BLD: 6 % (ref 13–44)
MCH RBC QN AUTO: 29.8 PG (ref 26.1–32.9)
MCHC RBC AUTO-ENTMCNC: 33.1 G/DL (ref 31.4–35)
MCV RBC AUTO: 90.1 FL (ref 82–102)
MONOCYTES # BLD: 0.2 K/UL (ref 0.1–1.3)
MONOCYTES NFR BLD: 4 % (ref 4–12)
NEUTS SEG # BLD: 5.1 K/UL (ref 1.7–8.2)
NEUTS SEG NFR BLD: 86 % (ref 43–78)
NRBC # BLD: 0 K/UL (ref 0–0.2)
PLATELET # BLD AUTO: 379 K/UL (ref 150–450)
PMV BLD AUTO: 11.1 FL (ref 9.4–12.3)
POTASSIUM SERPL-SCNC: 5.1 MMOL/L (ref 3.5–5.1)
PROT SERPL-MCNC: 7 G/DL (ref 6.3–8.2)
RBC # BLD AUTO: 4.06 M/UL (ref 4.05–5.2)
SERVICE CMNT-IMP: NORMAL
SODIUM SERPL-SCNC: 131 MMOL/L (ref 133–143)
WBC # BLD AUTO: 5.9 K/UL (ref 4.3–11.1)

## 2023-06-26 PROCEDURE — 6360000002 HC RX W HCPCS: Performed by: FAMILY MEDICINE

## 2023-06-26 PROCEDURE — 6360000002 HC RX W HCPCS: Performed by: HOSPITALIST

## 2023-06-26 PROCEDURE — 97110 THERAPEUTIC EXERCISES: CPT

## 2023-06-26 PROCEDURE — 1100000000 HC RM PRIVATE

## 2023-06-26 PROCEDURE — 2580000003 HC RX 258: Performed by: FAMILY MEDICINE

## 2023-06-26 PROCEDURE — 6370000000 HC RX 637 (ALT 250 FOR IP): Performed by: FAMILY MEDICINE

## 2023-06-26 PROCEDURE — 97530 THERAPEUTIC ACTIVITIES: CPT

## 2023-06-26 PROCEDURE — 36415 COLL VENOUS BLD VENIPUNCTURE: CPT

## 2023-06-26 PROCEDURE — 2580000003 HC RX 258: Performed by: HOSPITALIST

## 2023-06-26 PROCEDURE — 2700000000 HC OXYGEN THERAPY PER DAY

## 2023-06-26 PROCEDURE — 94761 N-INVAS EAR/PLS OXIMETRY MLT: CPT

## 2023-06-26 PROCEDURE — 6370000000 HC RX 637 (ALT 250 FOR IP): Performed by: HOSPITALIST

## 2023-06-26 PROCEDURE — 94640 AIRWAY INHALATION TREATMENT: CPT

## 2023-06-26 PROCEDURE — 94760 N-INVAS EAR/PLS OXIMETRY 1: CPT

## 2023-06-26 PROCEDURE — 80053 COMPREHEN METABOLIC PANEL: CPT

## 2023-06-26 PROCEDURE — 85025 COMPLETE CBC W/AUTO DIFF WBC: CPT

## 2023-06-26 RX ORDER — LEVOFLOXACIN 500 MG/1
500 TABLET, FILM COATED ORAL DAILY
Status: DISCONTINUED | OUTPATIENT
Start: 2023-06-27 | End: 2023-06-27 | Stop reason: HOSPADM

## 2023-06-26 RX ADMIN — LEVOFLOXACIN 500 MG: 500 TABLET, FILM COATED ORAL at 08:36

## 2023-06-26 RX ADMIN — ENOXAPARIN SODIUM 40 MG: 100 INJECTION SUBCUTANEOUS at 08:36

## 2023-06-26 RX ADMIN — MONTELUKAST 10 MG: 10 TABLET, FILM COATED ORAL at 20:55

## 2023-06-26 RX ADMIN — ACETAMINOPHEN 650 MG: 325 TABLET ORAL at 19:03

## 2023-06-26 RX ADMIN — BUDESONIDE INHALATION 500 MCG: 0.5 SUSPENSION RESPIRATORY (INHALATION) at 19:56

## 2023-06-26 RX ADMIN — VANCOMYCIN HYDROCHLORIDE 750 MG: 750 INJECTION, POWDER, LYOPHILIZED, FOR SOLUTION INTRAVENOUS at 12:37

## 2023-06-26 RX ADMIN — POLYETHYLENE GLYCOL 3350 17 G: 17 POWDER, FOR SOLUTION ORAL at 08:36

## 2023-06-26 RX ADMIN — SODIUM CHLORIDE, PRESERVATIVE FREE 10 ML: 5 INJECTION INTRAVENOUS at 20:56

## 2023-06-26 RX ADMIN — DOXYCYCLINE HYCLATE 100 MG: 100 CAPSULE ORAL at 08:36

## 2023-06-26 RX ADMIN — BUDESONIDE INHALATION 500 MCG: 0.5 SUSPENSION RESPIRATORY (INHALATION) at 08:21

## 2023-06-26 RX ADMIN — VANCOMYCIN HYDROCHLORIDE 750 MG: 750 INJECTION, POWDER, LYOPHILIZED, FOR SOLUTION INTRAVENOUS at 23:07

## 2023-06-26 RX ADMIN — SODIUM CHLORIDE, PRESERVATIVE FREE 10 ML: 5 INJECTION INTRAVENOUS at 08:36

## 2023-06-27 VITALS
HEIGHT: 66 IN | DIASTOLIC BLOOD PRESSURE: 59 MMHG | WEIGHT: 125.3 LBS | SYSTOLIC BLOOD PRESSURE: 98 MMHG | OXYGEN SATURATION: 93 % | HEART RATE: 81 BPM | RESPIRATION RATE: 20 BRPM | BODY MASS INDEX: 20.14 KG/M2 | TEMPERATURE: 99.3 F

## 2023-06-27 LAB
ANION GAP SERPL CALC-SCNC: 2 MMOL/L (ref 2–11)
BACTERIA SPEC CULT: NORMAL
BASOPHILS # BLD: 0 K/UL (ref 0–0.2)
BASOPHILS NFR BLD: 0 % (ref 0–2)
BUN SERPL-MCNC: 22 MG/DL (ref 8–23)
CALCIUM SERPL-MCNC: 8.9 MG/DL (ref 8.3–10.4)
CHLORIDE SERPL-SCNC: 102 MMOL/L (ref 101–110)
CO2 SERPL-SCNC: 28 MMOL/L (ref 21–32)
CREAT SERPL-MCNC: 0.6 MG/DL (ref 0.6–1)
DIFFERENTIAL METHOD BLD: ABNORMAL
EOSINOPHIL # BLD: 0.1 K/UL (ref 0–0.8)
EOSINOPHIL NFR BLD: 2 % (ref 0.5–7.8)
ERYTHROCYTE [DISTWIDTH] IN BLOOD BY AUTOMATED COUNT: 17.4 % (ref 11.9–14.6)
GLUCOSE SERPL-MCNC: 122 MG/DL (ref 65–100)
HCT VFR BLD AUTO: 32.9 % (ref 35.8–46.3)
HGB BLD-MCNC: 10.5 G/DL (ref 11.7–15.4)
IMM GRANULOCYTES # BLD AUTO: 0.1 K/UL (ref 0–0.5)
IMM GRANULOCYTES NFR BLD AUTO: 1 % (ref 0–5)
LYMPHOCYTES # BLD: 0.4 K/UL (ref 0.5–4.6)
LYMPHOCYTES NFR BLD: 8 % (ref 13–44)
MCH RBC QN AUTO: 29.2 PG (ref 26.1–32.9)
MCHC RBC AUTO-ENTMCNC: 31.9 G/DL (ref 31.4–35)
MCV RBC AUTO: 91.4 FL (ref 82–102)
MONOCYTES # BLD: 0.2 K/UL (ref 0.1–1.3)
MONOCYTES NFR BLD: 3 % (ref 4–12)
NEUTS SEG # BLD: 3.8 K/UL (ref 1.7–8.2)
NEUTS SEG NFR BLD: 86 % (ref 43–78)
NRBC # BLD: 0 K/UL (ref 0–0.2)
PLATELET # BLD AUTO: 315 K/UL (ref 150–450)
PMV BLD AUTO: 10.3 FL (ref 9.4–12.3)
POTASSIUM SERPL-SCNC: 4 MMOL/L (ref 3.5–5.1)
RBC # BLD AUTO: 3.6 M/UL (ref 4.05–5.2)
SERVICE CMNT-IMP: NORMAL
SODIUM SERPL-SCNC: 132 MMOL/L (ref 133–143)
VANCOMYCIN SERPL-MCNC: 11 UG/ML
WBC # BLD AUTO: 4.5 K/UL (ref 4.3–11.1)

## 2023-06-27 PROCEDURE — 80202 ASSAY OF VANCOMYCIN: CPT

## 2023-06-27 PROCEDURE — 6370000000 HC RX 637 (ALT 250 FOR IP): Performed by: FAMILY MEDICINE

## 2023-06-27 PROCEDURE — 6360000002 HC RX W HCPCS: Performed by: FAMILY MEDICINE

## 2023-06-27 PROCEDURE — 85025 COMPLETE CBC W/AUTO DIFF WBC: CPT

## 2023-06-27 PROCEDURE — 6370000000 HC RX 637 (ALT 250 FOR IP): Performed by: HOSPITALIST

## 2023-06-27 PROCEDURE — 36415 COLL VENOUS BLD VENIPUNCTURE: CPT

## 2023-06-27 PROCEDURE — 80048 BASIC METABOLIC PNL TOTAL CA: CPT

## 2023-06-27 PROCEDURE — 2580000003 HC RX 258: Performed by: FAMILY MEDICINE

## 2023-06-27 PROCEDURE — 94761 N-INVAS EAR/PLS OXIMETRY MLT: CPT

## 2023-06-27 PROCEDURE — 2580000003 HC RX 258: Performed by: HOSPITALIST

## 2023-06-27 PROCEDURE — 94640 AIRWAY INHALATION TREATMENT: CPT

## 2023-06-27 PROCEDURE — 6360000002 HC RX W HCPCS: Performed by: HOSPITALIST

## 2023-06-27 PROCEDURE — 94760 N-INVAS EAR/PLS OXIMETRY 1: CPT

## 2023-06-27 RX ORDER — DOXYCYCLINE HYCLATE 100 MG/1
100 CAPSULE ORAL EVERY 12 HOURS SCHEDULED
Qty: 10 CAPSULE | Refills: 0 | Status: SHIPPED | OUTPATIENT
Start: 2023-06-27 | End: 2023-07-02

## 2023-06-27 RX ORDER — LEVOFLOXACIN 500 MG/1
500 TABLET, FILM COATED ORAL DAILY
Qty: 3 TABLET | Refills: 0 | Status: SHIPPED | OUTPATIENT
Start: 2023-06-28 | End: 2023-07-01

## 2023-06-27 RX ADMIN — ENOXAPARIN SODIUM 40 MG: 100 INJECTION SUBCUTANEOUS at 09:07

## 2023-06-27 RX ADMIN — SODIUM CHLORIDE, PRESERVATIVE FREE 10 ML: 5 INJECTION INTRAVENOUS at 09:07

## 2023-06-27 RX ADMIN — BUDESONIDE INHALATION 500 MCG: 0.5 SUSPENSION RESPIRATORY (INHALATION) at 08:05

## 2023-06-27 RX ADMIN — POLYETHYLENE GLYCOL 3350 17 G: 17 POWDER, FOR SOLUTION ORAL at 09:07

## 2023-06-27 RX ADMIN — LEVOFLOXACIN 500 MG: 500 TABLET, FILM COATED ORAL at 09:07

## 2023-06-27 RX ADMIN — VANCOMYCIN HYDROCHLORIDE 1000 MG: 1 INJECTION, POWDER, LYOPHILIZED, FOR SOLUTION INTRAVENOUS at 09:06

## 2023-07-11 SDOH — HEALTH STABILITY: PHYSICAL HEALTH: ON AVERAGE, HOW MANY DAYS PER WEEK DO YOU ENGAGE IN MODERATE TO STRENUOUS EXERCISE (LIKE A BRISK WALK)?: 0 DAYS

## 2023-07-11 SDOH — HEALTH STABILITY: PHYSICAL HEALTH: ON AVERAGE, HOW MANY MINUTES DO YOU ENGAGE IN EXERCISE AT THIS LEVEL?: 0 MIN

## 2023-07-11 ASSESSMENT — SOCIAL DETERMINANTS OF HEALTH (SDOH)
WITHIN THE LAST YEAR, HAVE YOU BEEN KICKED, HIT, SLAPPED, OR OTHERWISE PHYSICALLY HURT BY YOUR PARTNER OR EX-PARTNER?: NO
WITHIN THE LAST YEAR, HAVE TO BEEN RAPED OR FORCED TO HAVE ANY KIND OF SEXUAL ACTIVITY BY YOUR PARTNER OR EX-PARTNER?: NO
WITHIN THE LAST YEAR, HAVE YOU BEEN HUMILIATED OR EMOTIONALLY ABUSED IN OTHER WAYS BY YOUR PARTNER OR EX-PARTNER?: YES
WITHIN THE LAST YEAR, HAVE YOU BEEN AFRAID OF YOUR PARTNER OR EX-PARTNER?: NO

## 2023-07-14 ENCOUNTER — OFFICE VISIT (OUTPATIENT)
Dept: FAMILY MEDICINE CLINIC | Facility: CLINIC | Age: 63
End: 2023-07-14
Payer: COMMERCIAL

## 2023-07-14 VITALS
HEART RATE: 104 BPM | HEIGHT: 66 IN | BODY MASS INDEX: 18.48 KG/M2 | SYSTOLIC BLOOD PRESSURE: 90 MMHG | DIASTOLIC BLOOD PRESSURE: 70 MMHG | WEIGHT: 115 LBS | OXYGEN SATURATION: 94 % | TEMPERATURE: 97.3 F

## 2023-07-14 DIAGNOSIS — R05.1 ACUTE COUGH: ICD-10-CM

## 2023-07-14 DIAGNOSIS — N39.0 URINARY TRACT INFECTION WITHOUT HEMATURIA, SITE UNSPECIFIED: Primary | ICD-10-CM

## 2023-07-14 DIAGNOSIS — R54 FRAILTY: ICD-10-CM

## 2023-07-14 DIAGNOSIS — Z09 HOSPITAL DISCHARGE FOLLOW-UP: ICD-10-CM

## 2023-07-14 DIAGNOSIS — Z99.89 USE OF CANE AS AMBULATORY AID: ICD-10-CM

## 2023-07-14 DIAGNOSIS — Z86.19 HISTORY OF SEPSIS: ICD-10-CM

## 2023-07-14 DIAGNOSIS — E88.89 STEATOSIS (HCC): ICD-10-CM

## 2023-07-14 DIAGNOSIS — Z87.01 HISTORY OF PNEUMONIA: ICD-10-CM

## 2023-07-14 DIAGNOSIS — K76.89 LIVER CYST: ICD-10-CM

## 2023-07-14 DIAGNOSIS — R79.89 ELEVATED LFTS: ICD-10-CM

## 2023-07-14 LAB
ALBUMIN SERPL-MCNC: 2.4 G/DL (ref 3.2–4.6)
ALBUMIN/GLOB SERPL: 0.5 (ref 0.4–1.6)
ALP SERPL-CCNC: 173 U/L (ref 50–136)
ALT SERPL-CCNC: 57 U/L (ref 12–65)
ANION GAP SERPL CALC-SCNC: 3 MMOL/L (ref 2–11)
AST SERPL-CCNC: 90 U/L (ref 15–37)
BASOPHILS # BLD: 0 K/UL (ref 0–0.2)
BASOPHILS NFR BLD: 1 % (ref 0–2)
BILIRUB SERPL-MCNC: 0.4 MG/DL (ref 0.2–1.1)
BILIRUBIN, URINE, POC: ABNORMAL
BLOOD URINE, POC: NEGATIVE
BUN SERPL-MCNC: 15 MG/DL (ref 8–23)
CALCIUM SERPL-MCNC: 9.2 MG/DL (ref 8.3–10.4)
CHLORIDE SERPL-SCNC: 100 MMOL/L (ref 101–110)
CHOLEST SERPL-MCNC: 107 MG/DL
CO2 SERPL-SCNC: 28 MMOL/L (ref 21–32)
CREAT SERPL-MCNC: 0.7 MG/DL (ref 0.6–1)
DIFFERENTIAL METHOD BLD: ABNORMAL
EOSINOPHIL # BLD: 0.1 K/UL (ref 0–0.8)
EOSINOPHIL NFR BLD: 2 % (ref 0.5–7.8)
ERYTHROCYTE [DISTWIDTH] IN BLOOD BY AUTOMATED COUNT: 17 % (ref 11.9–14.6)
GLOBULIN SER CALC-MCNC: 4.5 G/DL (ref 2.8–4.5)
GLUCOSE SERPL-MCNC: 86 MG/DL (ref 65–100)
GLUCOSE URINE, POC: NEGATIVE
HCT VFR BLD AUTO: 36 % (ref 35.8–46.3)
HDLC SERPL-MCNC: 43 MG/DL (ref 40–60)
HDLC SERPL: 2.5
HGB BLD-MCNC: 11.6 G/DL (ref 11.7–15.4)
IMM GRANULOCYTES # BLD AUTO: 0 K/UL (ref 0–0.5)
IMM GRANULOCYTES NFR BLD AUTO: 1 % (ref 0–5)
KETONES, URINE, POC: NEGATIVE
LDLC SERPL CALC-MCNC: 47.2 MG/DL
LEUKOCYTE ESTERASE, URINE, POC: ABNORMAL
LYMPHOCYTES # BLD: 0.8 K/UL (ref 0.5–4.6)
LYMPHOCYTES NFR BLD: 15 % (ref 13–44)
MCH RBC QN AUTO: 28.9 PG (ref 26.1–32.9)
MCHC RBC AUTO-ENTMCNC: 32.2 G/DL (ref 31.4–35)
MCV RBC AUTO: 89.8 FL (ref 82–102)
MONOCYTES # BLD: 0.5 K/UL (ref 0.1–1.3)
MONOCYTES NFR BLD: 10 % (ref 4–12)
NEUTS SEG # BLD: 3.6 K/UL (ref 1.7–8.2)
NEUTS SEG NFR BLD: 71 % (ref 43–78)
NITRITE, URINE, POC: NEGATIVE
NRBC # BLD: 0 K/UL (ref 0–0.2)
PH, URINE, POC: 6.5 (ref 4.6–8)
PLATELET # BLD AUTO: 367 K/UL (ref 150–450)
PMV BLD AUTO: 10.9 FL (ref 9.4–12.3)
POTASSIUM SERPL-SCNC: 4.1 MMOL/L (ref 3.5–5.1)
PROT SERPL-MCNC: 6.9 G/DL (ref 6.3–8.2)
PROTEIN,URINE, POC: 30
RBC # BLD AUTO: 4.01 M/UL (ref 4.05–5.2)
SODIUM SERPL-SCNC: 131 MMOL/L (ref 133–143)
SPECIFIC GRAVITY, URINE, POC: 1.01 (ref 1–1.03)
TRIGL SERPL-MCNC: 84 MG/DL (ref 35–150)
URINALYSIS CLARITY, POC: ABNORMAL
URINALYSIS COLOR, POC: ABNORMAL
UROBILINOGEN, POC: NORMAL
VLDLC SERPL CALC-MCNC: 16.8 MG/DL (ref 6–23)
WBC # BLD AUTO: 5 K/UL (ref 4.3–11.1)

## 2023-07-14 PROCEDURE — 1111F DSCHRG MED/CURRENT MED MERGE: CPT | Performed by: PHYSICIAN ASSISTANT

## 2023-07-14 PROCEDURE — 99214 OFFICE O/P EST MOD 30 MIN: CPT | Performed by: PHYSICIAN ASSISTANT

## 2023-07-14 PROCEDURE — 81003 URINALYSIS AUTO W/O SCOPE: CPT | Performed by: PHYSICIAN ASSISTANT

## 2023-07-14 SDOH — ECONOMIC STABILITY: INCOME INSECURITY: HOW HARD IS IT FOR YOU TO PAY FOR THE VERY BASICS LIKE FOOD, HOUSING, MEDICAL CARE, AND HEATING?: VERY HARD

## 2023-07-14 SDOH — ECONOMIC STABILITY: HOUSING INSECURITY
IN THE LAST 12 MONTHS, WAS THERE A TIME WHEN YOU DID NOT HAVE A STEADY PLACE TO SLEEP OR SLEPT IN A SHELTER (INCLUDING NOW)?: NO

## 2023-07-14 SDOH — ECONOMIC STABILITY: FOOD INSECURITY: WITHIN THE PAST 12 MONTHS, YOU WORRIED THAT YOUR FOOD WOULD RUN OUT BEFORE YOU GOT MONEY TO BUY MORE.: OFTEN TRUE

## 2023-07-14 SDOH — ECONOMIC STABILITY: FOOD INSECURITY: WITHIN THE PAST 12 MONTHS, THE FOOD YOU BOUGHT JUST DIDN'T LAST AND YOU DIDN'T HAVE MONEY TO GET MORE.: SOMETIMES TRUE

## 2023-07-14 ASSESSMENT — PATIENT HEALTH QUESTIONNAIRE - PHQ9
SUM OF ALL RESPONSES TO PHQ QUESTIONS 1-9: 2
2. FEELING DOWN, DEPRESSED OR HOPELESS: 1
SUM OF ALL RESPONSES TO PHQ9 QUESTIONS 1 & 2: 2
SUM OF ALL RESPONSES TO PHQ QUESTIONS 1-9: 2
SUM OF ALL RESPONSES TO PHQ QUESTIONS 1-9: 2
1. LITTLE INTEREST OR PLEASURE IN DOING THINGS: 1
SUM OF ALL RESPONSES TO PHQ QUESTIONS 1-9: 2

## 2023-07-17 ENCOUNTER — TELEPHONE (OUTPATIENT)
Dept: FAMILY MEDICINE CLINIC | Facility: CLINIC | Age: 63
End: 2023-07-17

## 2023-07-17 LAB
BACTERIA SPEC CULT: NORMAL
SERVICE CMNT-IMP: NORMAL

## 2023-07-17 RX ORDER — MONTELUKAST SODIUM 10 MG/1
10 TABLET ORAL NIGHTLY
Qty: 30 TABLET | Refills: 3 | Status: SHIPPED | OUTPATIENT
Start: 2023-07-17

## 2023-07-17 RX ORDER — FLUTICASONE PROPIONATE AND SALMETEROL 100; 50 UG/1; UG/1
1 POWDER RESPIRATORY (INHALATION) 2 TIMES DAILY
Qty: 1 EACH | Refills: 0 | Status: SHIPPED | OUTPATIENT
Start: 2023-07-17

## 2023-07-17 ASSESSMENT — ENCOUNTER SYMPTOMS
COUGH: 0
SHORTNESS OF BREATH: 0

## 2023-07-17 NOTE — TELEPHONE ENCOUNTER
Pt was seen Friday and none of her medications went to her [employer] in [facility]

## 2023-07-18 ENCOUNTER — TELEPHONE (OUTPATIENT)
Dept: FAMILY MEDICINE CLINIC | Facility: CLINIC | Age: 63
End: 2023-07-18

## 2023-07-18 ENCOUNTER — HOME HEALTH ADMISSION (OUTPATIENT)
Dept: HOME HEALTH SERVICES | Facility: HOME HEALTH | Age: 63
End: 2023-07-18

## 2023-07-18 ENCOUNTER — OFFICE VISIT (OUTPATIENT)
Dept: FAMILY MEDICINE CLINIC | Facility: CLINIC | Age: 63
End: 2023-07-18
Payer: COMMERCIAL

## 2023-07-18 VITALS
OXYGEN SATURATION: 90 % | HEIGHT: 66 IN | BODY MASS INDEX: 18.58 KG/M2 | SYSTOLIC BLOOD PRESSURE: 90 MMHG | TEMPERATURE: 97.2 F | WEIGHT: 115.6 LBS | HEART RATE: 64 BPM | DIASTOLIC BLOOD PRESSURE: 64 MMHG

## 2023-07-18 DIAGNOSIS — R54 FRAILTY: Primary | ICD-10-CM

## 2023-07-18 DIAGNOSIS — F32.A DEPRESSION, UNSPECIFIED DEPRESSION TYPE: ICD-10-CM

## 2023-07-18 DIAGNOSIS — R63.6 UNDERWEIGHT: ICD-10-CM

## 2023-07-18 DIAGNOSIS — E88.89 STEATOSIS (HCC): ICD-10-CM

## 2023-07-18 DIAGNOSIS — Z87.440 HISTORY OF UTI: ICD-10-CM

## 2023-07-18 DIAGNOSIS — E46 PROTEIN-CALORIE MALNUTRITION, UNSPECIFIED SEVERITY (HCC): ICD-10-CM

## 2023-07-18 DIAGNOSIS — R74.01 TRANSAMINITIS: ICD-10-CM

## 2023-07-18 PROBLEM — Z86.19 HISTORY OF SEPSIS: Status: ACTIVE | Noted: 2023-07-18

## 2023-07-18 PROBLEM — K76.89 LIVER CYST: Status: ACTIVE | Noted: 2023-07-18

## 2023-07-18 PROBLEM — N39.0 URINARY TRACT INFECTION WITHOUT HEMATURIA: Status: ACTIVE | Noted: 2023-07-18

## 2023-07-18 PROBLEM — R05.1 ACUTE COUGH: Status: ACTIVE | Noted: 2023-07-18

## 2023-07-18 PROBLEM — Z99.89 USE OF CANE AS AMBULATORY AID: Status: ACTIVE | Noted: 2023-07-18

## 2023-07-18 PROBLEM — Z87.01 HISTORY OF PNEUMONIA: Status: ACTIVE | Noted: 2023-07-18

## 2023-07-18 PROBLEM — Z09 HOSPITAL DISCHARGE FOLLOW-UP: Status: ACTIVE | Noted: 2023-07-18

## 2023-07-18 LAB
BILIRUBIN, URINE, POC: ABNORMAL
BLOOD URINE, POC: NEGATIVE
GLUCOSE URINE, POC: NEGATIVE
KETONES, URINE, POC: ABNORMAL
LEUKOCYTE ESTERASE, URINE, POC: ABNORMAL
NITRITE, URINE, POC: NEGATIVE
PH, URINE, POC: 7 (ref 4.6–8)
PROTEIN,URINE, POC: 30
SPECIFIC GRAVITY, URINE, POC: 1.01 (ref 1–1.03)
URINALYSIS CLARITY, POC: CLEAR
URINALYSIS COLOR, POC: ABNORMAL
UROBILINOGEN, POC: NORMAL

## 2023-07-18 PROCEDURE — 81003 URINALYSIS AUTO W/O SCOPE: CPT | Performed by: PHYSICIAN ASSISTANT

## 2023-07-18 PROCEDURE — 99214 OFFICE O/P EST MOD 30 MIN: CPT | Performed by: PHYSICIAN ASSISTANT

## 2023-07-18 RX ORDER — SERTRALINE HYDROCHLORIDE 25 MG/1
25 TABLET, FILM COATED ORAL NIGHTLY
Qty: 30 TABLET | Refills: 1 | Status: SHIPPED | OUTPATIENT
Start: 2023-07-18

## 2023-07-18 RX ORDER — AMOXICILLIN AND CLAVULANATE POTASSIUM 875; 125 MG/1; MG/1
1 TABLET, FILM COATED ORAL 2 TIMES DAILY
Qty: 20 TABLET | Refills: 0 | Status: SHIPPED | OUTPATIENT
Start: 2023-07-18 | End: 2023-07-28

## 2023-07-18 ASSESSMENT — PATIENT HEALTH QUESTIONNAIRE - PHQ9
2. FEELING DOWN, DEPRESSED OR HOPELESS: 0
SUM OF ALL RESPONSES TO PHQ9 QUESTIONS 1 & 2: 0
1. LITTLE INTEREST OR PLEASURE IN DOING THINGS: 0
SUM OF ALL RESPONSES TO PHQ QUESTIONS 1-9: 0

## 2023-07-18 NOTE — TELEPHONE ENCOUNTER
Let her know the cbc is stable the liver enzymes are stable but not normal yet and review of the ultrasound noted a cyst in the liver and appears to be depositing fat in liver so I\"m going to place GI referral.  If cough not resolving I want her to get outpatient radiology cxr in next two days and make an apt for recheck by Thursday. I've also referred her to social work for support and home health for support.

## 2023-07-18 NOTE — PROGRESS NOTES
[address] [address]  Phone [phone]  Fax [phone]  [place]  [place]    Patient: Marco Reyes  YOB: 1960  Patient Age 65 y.o. Patient sex: female  Medical Record:  [id number]  Visit Date:7/18/2023   Author:  Berna Hendrickson. Ant Yo, [address] Note     Chief complaint  Marco Reyes  is a 65 y.o. female who was seen on 07/19/23  10:16 AM  for the following reasons:    Chief Complaint   Patient presents with    Follow-up     Cough with sinus drainage       Current Medical problems addressed    She is drinking some fluids but only getting about 4 glasses of fluids and usually only a few sips   she doesn't like the taste of food so she hasn't been eating other than bone brouth or canned fruit and did some salad. She tried a protein shake and still has some pnd and cough that seems to aggregate it. She normally is around 125lbs and stopped eating due to feeling sick and didn't swallow well with all the coughing about a month ago'    She lost her job in covid and moved here recently and doesn't have energy to get up and work as she is still not eating well. ASSESSMENT AND PLAN    ICD-10-CM    1. Frailty  R54 [address] [facility]      2. History of UTI  Z87.440 AMB POC URINALYSIS DIP STICK AUTO W/O MICRO     Culture, Urine     Culture, Urine     [address] [facility]      3. Transaminitis  R74.01 [address] [facility]      4. 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 Underweight  R63.6 Amb Referral to Nutrition Services     [address] [facility]      6. Protein-calorie malnutrition, unspecified severity ([facility])  E46 Amb Referral to Nutrition Services     [address] [facility]      7. Depression, unspecified depression type  F32. A sertraline (ZOLOFT) 25 MG tablet     CHRISTINE Carrillo was seen today for

## 2023-07-19 ASSESSMENT — ENCOUNTER SYMPTOMS
SHORTNESS OF BREATH: 0
COUGH: 0

## 2023-07-20 ENCOUNTER — TELEPHONE (OUTPATIENT)
Dept: FAMILY MEDICINE CLINIC | Facility: CLINIC | Age: 63
End: 2023-07-20

## 2023-07-20 DIAGNOSIS — R54 FRAILTY: ICD-10-CM

## 2023-07-20 DIAGNOSIS — E88.89 STEATOSIS (HCC): ICD-10-CM

## 2023-07-20 DIAGNOSIS — Z99.89 USE OF CANE AS AMBULATORY AID: ICD-10-CM

## 2023-07-20 DIAGNOSIS — E46 PROTEIN-CALORIE MALNUTRITION, UNSPECIFIED SEVERITY (HCC): ICD-10-CM

## 2023-07-20 DIAGNOSIS — R74.01 TRANSAMINITIS: Primary | ICD-10-CM

## 2023-07-20 NOTE — TELEPHONE ENCOUNTER
Home health called today in regards to this patient and stated that her referral needs to be sent to [address] [place] [address] as they do not accept her insurance.     Thank you

## 2023-07-21 ENCOUNTER — TELEPHONE (OUTPATIENT)
Dept: FAMILY MEDICINE CLINIC | Facility: CLINIC | Age: 63
End: 2023-07-21

## 2023-07-21 LAB
BACTERIA SPEC CULT: NORMAL
SERVICE CMNT-IMP: NORMAL

## 2023-07-21 NOTE — TELEPHONE ENCOUNTER
Pt called and stated that she has had a bad reaction to the recent medication you have prescribed for her and she has stopped taking them. Please advise patient.     Thank you

## 2023-07-24 ENCOUNTER — TELEPHONE (OUTPATIENT)
Dept: FAMILY MEDICINE CLINIC | Facility: CLINIC | Age: 63
End: 2023-07-24

## 2023-07-24 NOTE — TELEPHONE ENCOUNTER
[facility] called in regards of this PT saying they are not accepting referrals at the moment for [facility].

## 2023-07-25 NOTE — TELEPHONE ENCOUNTER
Results given to pt. Patient states that she already had the chest x ray done. She said that she isn't coughing up mucus but she does get clear mucus in her mouth while she is eating. She has already talked to home health and she can't afford the services. Home health is not covered by her insurance and she would need documentation for the medical equipment.   She already bought a wheelchair and she is going to buy a chair for the shower

## 2023-07-27 ENCOUNTER — TELEPHONE (OUTPATIENT)
Dept: FAMILY MEDICINE CLINIC | Facility: CLINIC | Age: 63
End: 2023-07-27

## 2023-07-27 NOTE — TELEPHONE ENCOUNTER
Home health called and stated that they can not read her insurance card that was sent with the referral and they need another copy sent to them so they can process the referral.    Thank you

## 2023-08-17 PROBLEM — Z09 HOSPITAL DISCHARGE FOLLOW-UP: Status: RESOLVED | Noted: 2023-07-18 | Resolved: 2023-08-17

## 2024-05-16 ENCOUNTER — TELEPHONE (OUTPATIENT)
Dept: FAMILY MEDICINE CLINIC | Facility: CLINIC | Age: 64
End: 2024-05-16